# Patient Record
Sex: FEMALE | Race: WHITE | NOT HISPANIC OR LATINO | URBAN - METROPOLITAN AREA
[De-identification: names, ages, dates, MRNs, and addresses within clinical notes are randomized per-mention and may not be internally consistent; named-entity substitution may affect disease eponyms.]

---

## 2022-08-29 ENCOUNTER — APPOINTMENT (RX ONLY)
Dept: URBAN - METROPOLITAN AREA CLINIC 137 | Facility: CLINIC | Age: 77
Setting detail: DERMATOLOGY
End: 2022-08-29

## 2022-08-29 DIAGNOSIS — L20.89 OTHER ATOPIC DERMATITIS: ICD-10-CM | Status: INADEQUATELY CONTROLLED

## 2022-08-29 DIAGNOSIS — L71.0 PERIORAL DERMATITIS: ICD-10-CM

## 2022-08-29 PROCEDURE — ? REFERRAL

## 2022-08-29 PROCEDURE — ? COUNSELING

## 2022-08-29 PROCEDURE — ? PREDNISONE TAPER

## 2022-08-29 PROCEDURE — 99214 OFFICE O/P EST MOD 30 MIN: CPT

## 2022-08-29 PROCEDURE — ? PRESCRIPTION

## 2022-08-29 PROCEDURE — ? PRESCRIPTION MEDICATION MANAGEMENT

## 2022-08-29 PROCEDURE — ? MEDICATION COUNSELING

## 2022-08-29 PROCEDURE — ? OTC TREATMENT REGIMEN

## 2022-08-29 RX ORDER — PREDNISONE 10 MG/1
TABLET ORAL
Qty: 18 | Refills: 0 | Status: ERX

## 2022-08-29 ASSESSMENT — LOCATION SIMPLE DESCRIPTION DERM
LOCATION SIMPLE: RIGHT LIP
LOCATION SIMPLE: LEFT LIP
LOCATION SIMPLE: LEFT CHEEK
LOCATION SIMPLE: RIGHT CHEEK

## 2022-08-29 ASSESSMENT — LOCATION DETAILED DESCRIPTION DERM
LOCATION DETAILED: LEFT CENTRAL MALAR CHEEK
LOCATION DETAILED: RIGHT SUPERIOR VERMILION LIP
LOCATION DETAILED: RIGHT MEDIAL MALAR CHEEK
LOCATION DETAILED: LEFT INFERIOR VERMILION LIP

## 2022-08-29 ASSESSMENT — INVESTIGATOR STATIC GLOBAL ASSESSMENT
IN YOUR EXPERIENCE, AMONG ALL PATIENTS YOU HAVE SEEN WITH THIS CONDITION, HOW SEVERE IS THIS PATIENT'S CONDITION?: MODERATE TO SEVERE

## 2022-08-29 ASSESSMENT — SEVERITY ASSESSMENT 2020: SEVERITY 2020: SEVERE

## 2022-08-29 ASSESSMENT — LOCATION ZONE DERM
LOCATION ZONE: LIP
LOCATION ZONE: FACE

## 2022-08-29 ASSESSMENT — ITCH NUMERIC RATING SCALE: HOW SEVERE IS YOUR ITCHING?: 3

## 2022-08-29 ASSESSMENT — BSA ECZEMA: % BODY COVERED IN ECZEMA: 10

## 2022-08-29 NOTE — PROCEDURE: PRESCRIPTION MEDICATION MANAGEMENT
Render In Strict Bullet Format?: No
Detail Level: Zone
Continue Regimen: Tacrolimus around mouth
Initiate Treatment: Prednisone 10 3 for 3 days, 2 for 3 days ,1 for 3 days.

## 2022-08-29 NOTE — PROCEDURE: MEDICATION COUNSELING
Azithromycin Pregnancy And Lactation Text: This medication is considered safe during pregnancy and is also secreted in breast milk.
Imiquimod Counseling:  I discussed with the patient the risks of imiquimod including but not limited to erythema, scaling, itching, weeping, crusting, and pain. Patient understands that the inflammatory response to imiquimod is variable from person to person and was educated regarded proper titration schedule. If flu-like symptoms develop, patient knows to discontinue the medication and contact us.
Libtayo Pregnancy And Lactation Text: This medication is contraindicated in pregnancy and when breast feeding.
Bexarotene Pregnancy And Lactation Text: This medication is Pregnancy Category X and should not be given to women who are pregnant or may become pregnant. This medication should not be used if you are breast feeding.
Tetracycline Counseling: Patient counseled regarding possible photosensitivity and increased risk for sunburn. Patient instructed to avoid sunlight, if possible. When exposed to sunlight, patients should wear protective clothing, sunglasses, and sunscreen. The patient was instructed to call the office immediately if the following severe adverse effects occur:  hearing changes, easy bruising/bleeding, severe headache, or vision changes. The patient verbalized understanding of the proper use and possible adverse effects of tetracycline. All of the patient's questions and concerns were addressed. Patient understands to avoid pregnancy while on therapy due to potential birth defects.
Opzelura Pregnancy And Lactation Text: There is insufficient data to evaluate drug-associated risk for major birth defects, miscarriage, or other adverse maternal or fetal outcomes. There is a pregnancy registry that monitors pregnancy outcomes in pregnant persons exposed to the medication during pregnancy. It is unknown if this medication is excreted in breast milk. Do not breastfeed during treatment and for about 4 weeks after the last dose.
Tazorac Pregnancy And Lactation Text: This medication is not safe during pregnancy. It is unknown if this medication is excreted in breast milk.
Humira Counseling:  I discussed with the patient the risks of adalimumab including but not limited to myelosuppression, immunosuppression, autoimmune hepatitis, demyelinating diseases, lymphoma, and serious infections. The patient understands that monitoring is required including a PPD at baseline and must alert us or the primary physician if symptoms of infection or other concerning signs are noted.
Cibinqo Pregnancy And Lactation Text: It is unknown if this medication will adversely affect pregnancy or breast feeding. You should not take this medication if you are currently pregnant or planning a pregnancy or while breastfeeding.
Ketoconazole Pregnancy And Lactation Text: This medication is Pregnancy Category C and it isn't know if it is safe during pregnancy. It is also excreted in breast milk and breast feeding isn't recommended.
Stelara Pregnancy And Lactation Text: This medication is Pregnancy Category B and is considered safe during pregnancy. It is unknown if this medication is excreted in breast milk.
Topical Retinoid Pregnancy And Lactation Text: This medication is Pregnancy Category C. It is unknown if this medication is excreted in breast milk.
Finasteride Counseling:  I discussed with the patient the risks of use of finasteride including but not limited to decreased libido, decreased ejaculate volume, gynecomastia, and depression. Women should not handle medication. All of the patient's questions and concerns were addressed.
Erythromycin Pregnancy And Lactation Text: This medication is Pregnancy Category B and is considered safe during pregnancy. It is also excreted in breast milk.
Xolair Counseling:  Patient informed of potential adverse effects including but not limited to fever, muscle aches, rash and allergic reactions. The patient verbalized understanding of the proper use and possible adverse effects of Xolair. All of the patient's questions and concerns were addressed.
Clofazimine Pregnancy And Lactation Text: This medication is Pregnancy Category C and isn't considered safe during pregnancy. It is excreted in breast milk.
Benzoyl Peroxide Counseling: Patient counseled that medicine may cause skin irritation and bleach clothing. In the event of skin irritation, the patient was advised to reduce the amount of the drug applied or use it less frequently. The patient verbalized understanding of the proper use and possible adverse effects of benzoyl peroxide. All of the patient's questions and concerns were addressed.
Winlevi Pregnancy And Lactation Text: This medication is considered safe during pregnancy and breastfeeding.
Otezla Pregnancy And Lactation Text: This medication is Pregnancy Category C and it isn't known if it is safe during pregnancy. It is unknown if it is excreted in breast milk.
5-Fu Pregnancy And Lactation Text: This medication is Pregnancy Category X and contraindicated in pregnancy and in women who may become pregnant. It is unknown if this medication is excreted in breast milk.
Valtrex Pregnancy And Lactation Text: this medication is Pregnancy Category B and is considered safe during pregnancy. This medication is not directly found in breast milk but it's metabolite acyclovir is present.
Picato Counseling:  I discussed with the patient the risks of Picato including but not limited to erythema, scaling, itching, weeping, crusting, and pain.
Cyclosporine Pregnancy And Lactation Text: This medication is Pregnancy Category C and it isn't know if it is safe during pregnancy. This medication is excreted in breast milk.
Rituxan Counseling:  I discussed with the patient the risks of Rituxan infusions. Side effects can include infusion reactions, severe drug rashes including mucocutaneous reactions, reactivation of latent hepatitis and other infections and rarely progressive multifocal leukoencephalopathy. All of the patient's questions and concerns were addressed.
Spironolactone Counseling: Patient advised regarding risks of diarrhea, abdominal pain, hyperkalemia, birth defects (for female patients), liver toxicity and renal toxicity. The patient may need blood work to monitor liver and kidney function and potassium levels while on therapy. The patient verbalized understanding of the proper use and possible adverse effects of spironolactone. All of the patient's questions and concerns were addressed.
Niacinamide Counseling: I recommended taking niacin or niacinamide, also know as vitamin B3, twice daily. Recent evidence suggests that taking vitamin B3 (500 mg twice daily) can reduce the risk of actinic keratoses and non-melanoma skin cancers. Side effects of vitamin B3 include flushing and headache.
Albendazole Counseling:  I discussed with the patient the risks of albendazole including but not limited to cytopenia, kidney damage, nausea/vomiting and severe allergy. The patient understands that this medication is being used in an off-label manner.
Isotretinoin Counseling: Patient should get monthly blood tests, not donate blood, not drive at night if vision affected, not share medication, and not undergo elective surgery for 6 months after tx completed. Side effects reviewed, pt to contact office should one occur.
Topical Clindamycin Counseling: Patient counseled that this medication may cause skin irritation or allergic reactions. In the event of skin irritation, the patient was advised to reduce the amount of the drug applied or use it less frequently. The patient verbalized understanding of the proper use and possible adverse effects of clindamycin. All of the patient's questions and concerns were addressed.
Bactrim Counseling:  I discussed with the patient the risks of sulfa antibiotics including but not limited to GI upset, allergic reaction, drug rash, diarrhea, dizziness, photosensitivity, and yeast infections. Rarely, more serious reactions can occur including but not limited to aplastic anemia, agranulocytosis, methemoglobinemia, blood dyscrasias, liver or kidney failure, lung infiltrates or desquamative/blistering drug rashes.
Finasteride Pregnancy And Lactation Text: This medication is absolutely contraindicated during pregnancy. It is unknown if it is excreted in breast milk.
Tazorac Counseling:  Patient advised that medication is irritating and drying. Patient may need to apply sparingly and wash off after an hour before eventually leaving it on overnight. The patient verbalized understanding of the proper use and possible adverse effects of tazorac. All of the patient's questions and concerns were addressed.
Tetracycline Pregnancy And Lactation Text: This medication is Pregnancy Category D and not consider safe during pregnancy. It is also excreted in breast milk.
Drysol Counseling:  I discussed with the patient the risks of drysol/aluminum chloride including but not limited to skin rash, itching, irritation, burning.
Cimzia Counseling:  I discussed with the patient the risks of Cimzia including but not limited to immunosuppression, allergic reactions and infections. The patient understands that monitoring is required including a PPD at baseline and must alert us or the primary physician if symptoms of infection or other concerning signs are noted.
Taltz Counseling: I discussed with the patient the risks of ixekizumab including but not limited to immunosuppression, serious infections, worsening of inflammatory bowel disease and drug reactions. The patient understands that monitoring is required including a PPD at baseline and must alert us or the primary physician if symptoms of infection or other concerning signs are noted.
Terbinafine Counseling: Patient counseling regarding adverse effects of terbinafine including but not limited to headache, diarrhea, rash, upset stomach, liver function test abnormalities, itching, taste/smell disturbance, nausea, abdominal pain, and flatulence. There is a rare possibility of liver failure that can occur when taking terbinafine. The patient understands that a baseline LFT and kidney function test may be required. The patient verbalized understanding of the proper use and possible adverse effects of terbinafine. All of the patient's questions and concerns were addressed.
Use Enhanced Medication Counseling?: No
Colchicine Counseling:  Patient counseled regarding adverse effects including but not limited to stomach upset (nausea, vomiting, stomach pain, or diarrhea). Patient instructed to limit alcohol consumption while taking this medication. Colchicine may reduce blood counts especially with prolonged use. The patient understands that monitoring of kidney function and blood counts may be required, especially at baseline. The patient verbalized understanding of the proper use and possible adverse effects of colchicine. All of the patient's questions and concerns were addressed.
Xolair Pregnancy And Lactation Text: This medication is Pregnancy Category B and is considered safe during pregnancy. This medication is excreted in breast milk.
Spironolactone Pregnancy And Lactation Text: This medication can cause feminization of the male fetus and should be avoided during pregnancy. The active metabolite is also found in breast milk.
Metronidazole Counseling:  I discussed with the patient the risks of metronidazole including but not limited to seizures, nausea/vomiting, a metallic taste in the mouth, nausea/vomiting and severe allergy.
Benzoyl Peroxide Pregnancy And Lactation Text: This medication is Pregnancy Category C. It is unknown if benzoyl peroxide is excreted in breast milk.
Oxybutynin Counseling:  I discussed with the patient the risks of oxybutynin including but not limited to skin rash, drowsiness, dry mouth, difficulty urinating, and blurred vision.
Protopic Pregnancy And Lactation Text: This medication is Pregnancy Category C. It is unknown if this medication is excreted in breast milk when applied topically.
Zyclara Counseling:  I discussed with the patient the risks of imiquimod including but not limited to erythema, scaling, itching, weeping, crusting, and pain. Patient understands that the inflammatory response to imiquimod is variable from person to person and was educated regarded proper titration schedule. If flu-like symptoms develop, patient knows to discontinue the medication and contact us.
Rituxan Pregnancy And Lactation Text: This medication is Pregnancy Category C and it isn't know if it is safe during pregnancy. It is unknown if this medication is excreted in breast milk but similar antibodies are known to be excreted.
Methotrexate Counseling:  Patient counseled regarding adverse effects of methotrexate including but not limited to nausea, vomiting, abnormalities in liver function tests. Patients may develop mouth sores, rash, diarrhea, and abnormalities in blood counts. The patient understands that monitoring is required including LFT's and blood counts. There is a rare possibility of scarring of the liver and lung problems that can occur when taking methotrexate. Persistent nausea, loss of appetite, pale stools, dark urine, cough, and shortness of breath should be reported immediately. Patient advised to discontinue methotrexate treatment at least three months before attempting to become pregnant. I discussed the need for folate supplements while taking methotrexate. These supplements can decrease side effects during methotrexate treatment. The patient verbalized understanding of the proper use and possible adverse effects of methotrexate. All of the patient's questions and concerns were addressed.
Ilumya Counseling: I discussed with the patient the risks of tildrakizumab including but not limited to immunosuppression, malignancy, posterior leukoencephalopathy syndrome, and serious infections. The patient understands that monitoring is required including a PPD at baseline and must alert us or the primary physician if symptoms of infection or other concerning signs are noted.
Topical Clindamycin Pregnancy And Lactation Text: This medication is Pregnancy Category B and is considered safe during pregnancy. It is unknown if it is excreted in breast milk.
Niacinamide Pregnancy And Lactation Text: These medications are considered safe during pregnancy.
Bactrim Pregnancy And Lactation Text: This medication is Pregnancy Category D and is known to cause fetal risk. It is also excreted in breast milk.
Isotretinoin Pregnancy And Lactation Text: This medication is Pregnancy Category X and is considered extremely dangerous during pregnancy. It is unknown if it is excreted in breast milk.
Albendazole Pregnancy And Lactation Text: This medication is Pregnancy Category C and it isn't known if it is safe during pregnancy. It is also excreted in breast milk.
Drysol Pregnancy And Lactation Text: This medication is considered safe during pregnancy and breast feeding.
Terbinafine Pregnancy And Lactation Text: This medication is Pregnancy Category B and is considered safe during pregnancy. It is also excreted in breast milk and breast feeding isn't recommended.
Gabapentin Counseling: I discussed with the patient the risks of gabapentin including but not limited to dizziness, somnolence, fatigue and ataxia.
Klisyri Counseling:  I discussed with the patient the risks of Buzzy Her including but not limited to erythema, scaling, itching, weeping, crusting, and pain.
Cimzia Pregnancy And Lactation Text: This medication crosses the placenta but can be considered safe in certain situations. Cimzia may be excreted in breast milk.
Fluconazole Counseling:  Patient counseled regarding adverse effects of fluconazole including but not limited to headache, diarrhea, nausea, upset stomach, liver function test abnormalities, taste disturbance, and stomach pain. There is a rare possibility of liver failure that can occur when taking fluconazole. The patient understands that monitoring of LFTs and kidney function test may be required, especially at baseline. The patient verbalized understanding of the proper use and possible adverse effects of fluconazole. All of the patient's questions and concerns were addressed.
Methotrexate Pregnancy And Lactation Text: This medication is Pregnancy Category X and is known to cause fetal harm. This medication is excreted in breast milk.
Qbrexza Counseling:  I discussed with the patient the risks of Erin Davison including but not limited to headache, mydriasis, blurred vision, dry eyes, nasal dryness, dry mouth, dry throat, dry skin, urinary hesitation, and constipation. Local skin reactions including erythema, burning, stinging, and itching can also occur.
Carac Counseling:  I discussed with the patient the risks of Carac including but not limited to erythema, scaling, itching, weeping, crusting, and pain.
Cephalexin Counseling: I counseled the patient regarding use of cephalexin as an antibiotic for prophylactic and/or therapeutic purposes. Cephalexin (commonly prescribed under brand name Keflex) is a cephalosporin antibiotic which is active against numerous classes of bacteria, including most skin bacteria. Side effects may include nausea, diarrhea, gastrointestinal upset, rash, hives, yeast infections, and in rare cases, hepatitis, kidney disease, seizures, fever, confusion, neurologic symptoms, and others. Patients with severe allergies to penicillin medications are cautioned that there is about a 10% incidence of cross-reactivity with cephalosporins. When possible, patients with penicillin allergies should use alternatives to cephalosporins for antibiotic therapy.
Siliq Counseling:  I discussed with the patient the risks of Siliq including but not limited to new or worsening depression, suicidal thoughts and behavior, immunosuppression, malignancy, posterior leukoencephalopathy syndrome, and serious infections. The patient understands that monitoring is required including a PPD at baseline and must alert us or the primary physician if symptoms of infection or other concerning signs are noted. There is also a special program designed to monitor depression which is required with Siliq.
Protopic Counseling: Patient may experience a mild burning sensation during topical application. Protopic is not approved in children less than 3years of age. There have been case reports of hematologic and skin malignancies in patients using topical calcineurin inhibitors although causality is questionable.
Metronidazole Pregnancy And Lactation Text: This medication is Pregnancy Category B and considered safe during pregnancy. It is also excreted in breast milk.
High Dose Vitamin A Counseling: Side effects reviewed, pt to contact office should one occur.
Nsaids Counseling: NSAID Counseling: I discussed with the patient that NSAIDs should be taken with food. Prolonged use of NSAIDs can result in the development of stomach ulcers. Patient advised to stop taking NSAIDs if abdominal pain occurs. The patient verbalized understanding of the proper use and possible adverse effects of NSAIDs. All of the patient's questions and concerns were addressed.
Topical Ketoconazole Counseling: Patient counseled that this medication may cause skin irritation or allergic reactions. In the event of skin irritation, the patient was advised to reduce the amount of the drug applied or use it less frequently. The patient verbalized understanding of the proper use and possible adverse effects of ketoconazole. All of the patient's questions and concerns were addressed.
SSKI Counseling:  I discussed with the patient the risks of SSKI including but not limited to thyroid abnormalities, metallic taste, GI upset, fever, headache, acne, arthralgias, paraesthesias, lymphadenopathy, easy bleeding, arrhythmias, and allergic reaction.
Ilumya Pregnancy And Lactation Text: The risk during pregnancy and breastfeeding is uncertain with this medication.
Klisyri Pregnancy And Lactation Text: It is unknown if this medication can harm a developing fetus or if it is excreted in breast milk.
Azathioprine Counseling:  I discussed with the patient the risks of azathioprine including but not limited to myelosuppression, immunosuppression, hepatotoxicity, lymphoma, and infections. The patient understands that monitoring is required including baseline LFTs, Creatinine, possible TPMP genotyping and weekly CBCs for the first month and then every 2 weeks thereafter. The patient verbalized understanding of the proper use and possible adverse effects of azathioprine. All of the patient's questions and concerns were addressed.
Ivermectin Counseling:  Patient instructed to take medication on an empty stomach with a full glass of water. Patient informed of potential adverse effects including but not limited to nausea, diarrhea, dizziness, itching, and swelling of the extremities or lymph nodes. The patient verbalized understanding of the proper use and possible adverse effects of ivermectin. All of the patient's questions and concerns were addressed.
Quinolones Counseling:  I discussed with the patient the risks of fluoroquinolones including but not limited to GI upset, allergic reaction, drug rash, diarrhea, dizziness, photosensitivity, yeast infections, liver function test abnormalities, tendonitis/tendon rupture.
Cosentyx Counseling:  I discussed with the patient the risks of Cosentyx including but not limited to worsening of Crohn's disease, immunosuppression, allergic reactions and infections. The patient understands that monitoring is required including a PPD at baseline and must alert us or the primary physician if symptoms of infection or other concerning signs are noted.
Elidel Counseling: Patient may experience a mild burning sensation during topical application. Elidel is not approved in children less than 3years of age. There have been case reports of hematologic and skin malignancies in patients using topical calcineurin inhibitors although causality is questionable.
Dapsone Counseling: I discussed with the patient the risks of dapsone including but not limited to hemolytic anemia, agranulocytosis, rashes, methemoglobinemia, kidney failure, peripheral neuropathy, headaches, GI upset, and liver toxicity. Patients who start dapsone require monitoring including baseline LFTs and weekly CBCs for the first month, then every month thereafter. The patient verbalized understanding of the proper use and possible adverse effects of dapsone. All of the patient's questions and concerns were addressed.
Qbrexza Pregnancy And Lactation Text: There is no available data on Qbrexza use in pregnant women. There is no available data on Qbrexza use in lactation.
Fluconazole Pregnancy And Lactation Text: This medication is Pregnancy Category C and it isn't know if it is safe during pregnancy. It is also excreted in breast milk.
Prednisone Counseling:  I discussed with the patient the risks of prolonged use of prednisone including but not limited to weight gain, insomnia, osteoporosis, mood changes, diabetes, susceptibility to infection, glaucoma and high blood pressure. In cases where prednisone use is prolonged, patients should be monitored with blood pressure checks, serum glucose levels and an eye exam.  Additionally, the patient may need to be placed on GI prophylaxis, PCP prophylaxis, and calcium and vitamin D supplementation and/or a bisphosphonate. The patient verbalized understanding of the proper use and the possible adverse effects of prednisone. All of the patient's questions and concerns were addressed.
Sski Pregnancy And Lactation Text: This medication is Pregnancy Category D and isn't considered safe during pregnancy. It is excreted in breast milk.
Propranolol Counseling:  I discussed with the patient the risks of propranolol including but not limited to low heart rate, low blood pressure, low blood sugar, restlessness and increased cold sensitivity. They should call the office if they experience any of these side effects.
Minocycline Counseling: Patient advised regarding possible photosensitivity and discoloration of the teeth, skin, lips, tongue and gums. Patient instructed to avoid sunlight, if possible. When exposed to sunlight, patients should wear protective clothing, sunglasses, and sunscreen. The patient was instructed to call the office immediately if the following severe adverse effects occur:  hearing changes, easy bruising/bleeding, severe headache, or vision changes. The patient verbalized understanding of the proper use and possible adverse effects of minocycline. All of the patient's questions and concerns were addressed.
Cimetidine Counseling:  I discussed with the patient the risks of Cimetidine including but not limited to gynecomastia, headache, diarrhea, nausea, drowsiness, arrhythmias, pancreatitis, skin rashes, psychosis, bone marrow suppression and kidney toxicity.
High Dose Vitamin A Pregnancy And Lactation Text: High dose vitamin A therapy is contraindicated during pregnancy and breast feeding.
Nsaids Pregnancy And Lactation Text: These medications are considered safe up to 30 weeks gestation. It is excreted in breast milk.
Minoxidil Counseling: Minoxidil is a topical medication which can increase blood flow where it is applied. It is uncertain how this medication increases hair growth. Side effects are uncommon and include stinging and allergic reactions.
Cephalexin Pregnancy And Lactation Text: This medication is Pregnancy Category B and considered safe during pregnancy. It is also excreted in breast milk but can be used safely for shorter doses.
Glycopyrrolate Counseling:  I discussed with the patient the risks of glycopyrrolate including but not limited to skin rash, drowsiness, dry mouth, difficulty urinating, and blurred vision.
Infliximab Counseling:  I discussed with the patient the risks of infliximab including but not limited to myelosuppression, immunosuppression, autoimmune hepatitis, demyelinating diseases, lymphoma, and serious infections. The patient understands that monitoring is required including a PPD at baseline and must alert us or the primary physician if symptoms of infection or other concerning signs are noted.
Azathioprine Pregnancy And Lactation Text: This medication is Pregnancy Category D and isn't considered safe during pregnancy. It is unknown if this medication is excreted in breast milk.
Opioid Counseling: I discussed with the patient the potential side effects of opioids including but not limited to addiction, altered mental status, and depression. I stressed avoiding alcohol, benzodiazepines, muscle relaxants and sleep aids unless specifically okayed by a physician. The patient verbalized understanding of the proper use and possible adverse effects of opioids. All of the patient's questions and concerns were addressed. They were instructed to flush the remaining pills down the toilet if they did not need them for pain.
Detail Level: Simple
Rhofade Counseling: Rhofade is a topical medication which can decrease superficial blood flow where applied. Side effects are uncommon and include stinging, redness and allergic reactions.
Dapsone Pregnancy And Lactation Text: This medication is Pregnancy Category C and is not considered safe during pregnancy or breast feeding.
Griseofulvin Counseling:  I discussed with the patient the risks of griseofulvin including but not limited to photosensitivity, cytopenia, liver damage, nausea/vomiting and severe allergy. The patient understands that this medication is best absorbed when taken with a fatty meal (e.g., ice cream or french fries).
Propranolol Pregnancy And Lactation Text: This medication is Pregnancy Category C and it isn't known if it is safe during pregnancy. It is excreted in breast milk.
Calcipotriene Counseling: Cantharidin Counseling:  I discussed with the patient the risks of Cantharidin including but not limited to pain, redness, burning, itching, and blistering.
Simponi Counseling:  I discussed with the patient the risks of golimumab including but not limited to myelosuppression, immunosuppression, autoimmune hepatitis, demyelinating diseases, lymphoma, and serious infections. The patient understands that monitoring is required including a PPD at baseline and must alert us or the primary physician if symptoms of infection or other concerning signs are noted.
Thalidomide Counseling: I discussed with the patient the risks of thalidomide including but not limited to birth defects, anxiety, weakness, chest pain, dizziness, cough and severe allergy.
Cellcept Counseling:  I discussed with the patient the risks of mycophenolate mofetil including but not limited to infection/immunosuppression, GI upset, hypokalemia, hypercholesterolemia, bone marrow suppression, lymphoproliferative disorders, malignancy, GI ulceration/bleed/perforation, colitis, interstitial lung disease, kidney failure, progressive multifocal leukoencephalopathy, and birth defects. The patient understands that monitoring is required including a baseline creatinine and regular CBC testing. In addition, patient must alert us immediately if symptoms of infection or other concerning signs are noted.
Odomzo Counseling- I discussed with the patient the risks of Odomzo including but not limited to nausea, vomiting, diarrhea, constipation, weight loss, changes in the sense of taste, decreased appetite, muscle spasms, and hair loss. The patient verbalized understanding of the proper use and possible adverse effects of Odomzo. All of the patient's questions and concerns were addressed.
Topical Sulfur Applications Counseling: Topical Sulfur Counseling: Patient counseled that this medication may cause skin irritation or allergic reactions. In the event of skin irritation, the patient was advised to reduce the amount of the drug applied or use it less frequently. The patient verbalized understanding of the proper use and possible adverse effects of topical sulfur application. All of the patient's questions and concerns were addressed.
Clindamycin Counseling: I counseled the patient regarding use of clindamycin as an antibiotic for prophylactic and/or therapeutic purposes. Clindamycin is active against numerous classes of bacteria, including skin bacteria. Side effects may include nausea, diarrhea, gastrointestinal upset, rash, hives, yeast infections, and in rare cases, colitis.
Glycopyrrolate Pregnancy And Lactation Text: This medication is Pregnancy Category B and is considered safe during pregnancy. It is unknown if it is excreted breast milk.
Minoxidil Pregnancy And Lactation Text: This medication has not been assigned a Pregnancy Risk Category but animal studies failed to show danger with the topical medication. It is unknown if the medication is excreted in breast milk.
Opioid Pregnancy And Lactation Text: These medications can lead to premature delivery and should be avoided during pregnancy. These medications are also present in breast milk in small amounts.
Dupixent Counseling: I discussed with the patient the risks of dupilumab including but not limited to eye infection and irritation, cold sores, injection site reactions, worsening of asthma, allergic reactions and increased risk of parasitic infection. Live vaccines should be avoided while taking dupilumab. Dupilumab will also interact with certain medications such as warfarin and cyclosporine. The patient understands that monitoring is required and they must alert us or the primary physician if symptoms of infection or other concerning signs are noted.
Eucrisa Counseling: Patient may experience a mild burning sensation during topical application. Angelica Fears is not approved in children less than 1months of age.
Rifampin Counseling: I discussed with the patient the risks of rifampin including but not limited to liver damage, kidney damage, red-orange body fluids, nausea/vomiting and severe allergy.
Griseofulvin Pregnancy And Lactation Text: This medication is Pregnancy Category X and is known to cause serious birth defects. It is unknown if this medication is excreted in breast milk but breast feeding should be avoided.
Rhofade Pregnancy And Lactation Text: This medication has not been assigned a Pregnancy Risk Category. It is unknown if the medication is excreted in breast milk.
Dutasteride Counseling: Dustasteride Counseling:  I discussed with the patient the risks of use of dutasteride including but not limited to decreased libido, decreased ejaculate volume, and gynecomastia. Women who can become pregnant should not handle medication. All of the patient's questions and concerns were addressed.
Thalidomide Pregnancy And Lactation Text: This medication is Pregnancy Category X and is absolutely contraindicated during pregnancy. It is unknown if it is excreted in breast milk.
Birth Control Pills Counseling: Birth Control Pill Counseling: I discussed with the patient the potential side effects of OCPs including but not limited to increased risk of stroke, heart attack, thrombophlebitis, deep venous thrombosis, hepatic adenomas, breast changes, GI upset, headaches, and depression. The patient verbalized understanding of the proper use and possible adverse effects of OCPs. All of the patient's questions and concerns were addressed.
Tremfya Counseling: I discussed with the patient the risks of guselkumab including but not limited to immunosuppression, serious infections, and drug reactions. The patient understands that monitoring is required including a PPD at baseline and must alert us or the primary physician if symptoms of infection or other concerning signs are noted.
Calcipotriene Pregnancy And Lactation Text: This medication has not been proven safe during pregnancy. It is unknown if this medication is excreted in breast milk.
Doxepin Counseling:  Patient advised that the medication is sedating and not to drive a car after taking this medication. Patient informed of potential adverse effects including but not limited to dry mouth, urinary retention, and blurry vision. The patient verbalized understanding of the proper use and possible adverse effects of doxepin. All of the patient's questions and concerns were addressed.
Olumiant Counseling: I discussed with the patient the risks of Olumiant therapy including but not limited to upper respiratory tract infections, shingles, cold sores, and nausea. Live vaccines should be avoided. This medication has been linked to serious infections; higher rate of mortality; malignancy and lymphoproliferative disorders; major adverse cardiovascular events; thrombosis; gastrointestinal perforations; neutropenia; lymphopenia; anemia; liver enzyme elevations; and lipid elevations.
Topical Sulfur Applications Pregnancy And Lactation Text: This medication is considered safe during pregnancy and breast feeding secondary to limited systemic absorption.
Dupixent Pregnancy And Lactation Text: This medication likely crosses the placenta but the risk for the fetus is uncertain. This medication is excreted in breast milk.
Aklief counseling:  Patient advised to apply a pea-sized amount only at bedtime and wait 30 minutes after washing their face before applying. If too drying, patient may add a non-comedogenic moisturizer. The most commonly reported side effects including irritation, redness, scaling, dryness, stinging, burning, itching, and increased risk of sunburn. The patient verbalized understanding of the proper use and possible adverse effects of retinoids. All of the patient's questions and concerns were addressed.
Clindamycin Pregnancy And Lactation Text: This medication can be used in pregnancy if certain situations. Clindamycin is also present in breast milk.
Hydroxychloroquine Counseling:  I discussed with the patient that a baseline ophthalmologic exam is needed at the start of therapy and every year thereafter while on therapy. A CBC may also be warranted for monitoring. The side effects of this medication were discussed with the patient, including but not limited to agranulocytosis, aplastic anemia, seizures, rashes, retinopathy, and liver toxicity. Patient instructed to call the office should any adverse effect occur. The patient verbalized understanding of the proper use and possible adverse effects of Plaquenil. All the patient's questions and concerns were addressed.
Acitretin Counseling:  I discussed with the patient the risks of acitretin including but not limited to hair loss, dry lips/skin/eyes, liver damage, hyperlipidemia, depression/suicidal ideation, photosensitivity. Serious rare side effects can include but are not limited to pancreatitis, pseudotumor cerebri, bony changes, clot formation/stroke/heart attack. Patient understands that alcohol is contraindicated since it can result in liver toxicity and significantly prolong the elimination of the drug by many years.
Rifampin Pregnancy And Lactation Text: This medication is Pregnancy Category C and it isn't know if it is safe during pregnancy. It is also excreted in breast milk and should not be used if you are breast feeding.
Dutasteride Pregnancy And Lactation Text: This medication is absolutely contraindicated in women, especially during pregnancy and breast feeding. Feminization of male fetuses is possible if taking while pregnant.
Itraconazole Counseling:  I discussed with the patient the risks of itraconazole including but not limited to liver damage, nausea/vomiting, neuropathy, and severe allergy. The patient understands that this medication is best absorbed when taken with acidic beverages such as non-diet cola or ginger ale. The patient understands that monitoring is required including baseline LFTs and repeat LFTs at intervals. The patient understands that they are to contact us or the primary physician if concerning signs are noted.
Mirvaso Counseling: Estil Nurse is a topical medication which can decrease superficial blood flow where applied. Side effects are uncommon and include stinging, redness and allergic reactions.
Cantharidin Counseling: Calcipotriene Counseling:  I discussed with the patient the risks of calcipotriene including but not limited to erythema, scaling, itching, and irritation.
Adbry Counseling: I discussed with the patient the risks of tralokinumab including but not limited to eye infection and irritation, cold sores, injection site reactions, worsening of asthma, allergic reactions and increased risk of parasitic infection. Live vaccines should be avoided while taking tralokinumab. The patient understands that monitoring is required and they must alert us or the primary physician if symptoms of infection or other concerning signs are noted.
Tranexamic Acid Counseling:  Patient advised of the small risk of bleeding problems with tranexamic acid. They were also instructed to call if they developed any nausea, vomiting or diarrhea. All of the patient's questions and concerns were addressed.
Solaraze Counseling:  I discussed with the patient the risks of Solaraze including but not limited to erythema, scaling, itching, weeping, crusting, and pain.
Arava Counseling:  Patient counseled regarding adverse effects of Arava including but not limited to nausea, vomiting, abnormalities in liver function tests. Patients may develop mouth sores, rash, diarrhea, and abnormalities in blood counts. The patient understands that monitoring is required including LFTs and blood counts. There is a rare possibility of scarring of the liver and lung problems that can occur when taking methotrexate. Persistent nausea, loss of appetite, pale stools, dark urine, cough, and shortness of breath should be reported immediately. Patient advised to discontinue Arava treatment and consult with a physician prior to attempting conception. The patient will have to undergo a treatment to eliminate Arava from the body prior to conception.
Birth Control Pills Pregnancy And Lactation Text: This medication should be avoided if pregnant and for the first 30 days post-partum.
Doxycycline Counseling:  Patient counseled regarding possible photosensitivity and increased risk for sunburn. Patient instructed to avoid sunlight, if possible. When exposed to sunlight, patients should wear protective clothing, sunglasses, and sunscreen. The patient was instructed to call the office immediately if the following severe adverse effects occur:  hearing changes, easy bruising/bleeding, severe headache, or vision changes. The patient verbalized understanding of the proper use and possible adverse effects of doxycycline. All of the patient's questions and concerns were addressed.
Doxepin Pregnancy And Lactation Text: This medication is Pregnancy Category C and it isn't known if it is safe during pregnancy. It is also excreted in breast milk and breast feeding isn't recommended.
Skyrizi Counseling: I discussed with the patient the risks of risankizumab-rzaa including but not limited to immunosuppression, and serious infections. The patient understands that monitoring is required including a PPD at baseline and must alert us or the primary physician if symptoms of infection or other concerning signs are noted.
Aklief Pregnancy And Lactation Text: It is unknown if this medication is safe to use during pregnancy. It is unknown if this medication is excreted in breast milk. Breastfeeding women should use the topical cream on the smallest area of the skin for the shortest time needed while breastfeeding. Do not apply to nipple and areola.
Oral Minoxidil Counseling- I discussed with the patient the risks of oral minoxidil including but not limited to shortness of breath, swelling of the feet or ankles, dizziness, lightheadedness, unwanted hair growth and allergic reaction. The patient verbalized understanding of the proper use and possible adverse effects of oral minoxidil. All of the patient's questions and concerns were addressed.
Wartpeel Counseling:  I discussed with the patient the risks of Wartpeel including but not limited to erythema, scaling, itching, weeping, crusting, and pain.
Cyclophosphamide Counseling:  I discussed with the patient the risks of cyclophosphamide including but not limited to hair loss, hormonal abnormalities, decreased fertility, abdominal pain, diarrhea, nausea and vomiting, bone marrow suppression and infection. The patient understands that monitoring is required while taking this medication.
Olumiant Pregnancy And Lactation Text: Based on animal studies, Lavcasi Davenportes may cause embryo-fetal harm when administered to pregnant women. The medication should not be used in pregnancy. Breastfeeding is not recommended during treatment.
Enbrel Counseling:  I discussed with the patient the risks of etanercept including but not limited to myelosuppression, immunosuppression, autoimmune hepatitis, demyelinating diseases, lymphoma, and infections. The patient understands that monitoring is required including a PPD at baseline and must alert us or the primary physician if symptoms of infection or other concerning signs are noted.
Hydroxychloroquine Pregnancy And Lactation Text: This medication has been shown to cause fetal harm but it isn't assigned a Pregnancy Risk Category. There are small amounts excreted in breast milk.
Acitretin Pregnancy And Lactation Text: This medication is Pregnancy Category X and should not be given to women who are pregnant or may become pregnant in the future. This medication is excreted in breast milk.
Sarecycline Counseling: Patient advised regarding possible photosensitivity and discoloration of the teeth, skin, lips, tongue and gums. Patient instructed to avoid sunlight, if possible. When exposed to sunlight, patients should wear protective clothing, sunglasses, and sunscreen. The patient was instructed to call the office immediately if the following severe adverse effects occur:  hearing changes, easy bruising/bleeding, severe headache, or vision changes. The patient verbalized understanding of the proper use and possible adverse effects of sarecycline. All of the patient's questions and concerns were addressed.
Erivedge Counseling- I discussed with the patient the risks of Erivedge including but not limited to nausea, vomiting, diarrhea, constipation, weight loss, changes in the sense of taste, decreased appetite, muscle spasms, and hair loss. The patient verbalized understanding of the proper use and possible adverse effects of Erivedge. All of the patient's questions and concerns were addressed.
Hydroquinone Counseling:  Patient advised that medication may result in skin irritation, lightening (hypopigmentation), dryness, and burning. In the event of skin irritation, the patient was advised to reduce the amount of the drug applied or use it less frequently. Rarely, spots that are treated with hydroquinone can become darker (pseudoochronosis). Should this occur, patient instructed to stop medication and call the office. The patient verbalized understanding of the proper use and possible adverse effects of hydroquinone. All of the patient's questions and concerns were addressed.
Solaraze Pregnancy And Lactation Text: This medication is Pregnancy Category B and is considered safe. There is some data to suggest avoiding during the third trimester. It is unknown if this medication is excreted in breast milk.
Adbry Pregnancy And Lactation Text: It is unknown if this medication will adversely affect pregnancy or breast feeding.
Hydroxyzine Counseling: Patient advised that the medication is sedating and not to drive a car after taking this medication. Patient informed of potential adverse effects including but not limited to dry mouth, urinary retention, and blurry vision. The patient verbalized understanding of the proper use and possible adverse effects of hydroxyzine. All of the patient's questions and concerns were addressed.
Cantharidin Pregnancy And Lactation Text: The use of this medication during pregnancy or lactation is not recommended as there is insufficient data.
Tranexamic Acid Pregnancy And Lactation Text: It is unknown if this medication is safe during pregnancy or breast feeding.
Rinvoq Counseling: I discussed with the patient the risks of Rinvoq therapy including but not limited to upper respiratory tract infections, shingles, cold sores, bronchitis, nausea, cough, fever, acne, and headache. Live vaccines should be avoided. This medication has been linked to serious infections; higher rate of mortality; malignancy and lymphoproliferative disorders; major adverse cardiovascular events; thrombosis; thrombocytopenia, anemia, and neutropenia; lipid elevations; liver enzyme elevations; and gastrointestinal perforations.
Xeljanz Counseling: Tita Davila Counseling: I discussed with the patient the risks of Tita Giovanni therapy including increased risk of infection, liver issues, headache, diarrhea, or cold symptoms. Live vaccines should be avoided. They were instructed to call if they have any problems.
Oral Minoxidil Pregnancy And Lactation Text: This medication should only be used when clearly needed if you are pregnant, attempting to become pregnant or breast feeding.
Azelaic Acid Counseling: Patient counseled that medicine may cause skin irritation and to avoid applying near the eyes. In the event of skin irritation, the patient was advised to reduce the amount of the drug applied or use it less frequently. The patient verbalized understanding of the proper use and possible adverse effects of azelaic acid. All of the patient's questions and concerns were addressed.
Opzelura Counseling:  I discussed with the patient the risks of Edward Lancaster including but not limited to nasopharngitis, bronchitis, ear infection, eosinophila, hives, diarrhea, folliculitis, tonsillitis, and rhinorrhea. Taken orally, this medication has been linked to serious infections; higher rate of mortality; malignancy and lymphoproliferative disorders; major adverse cardiovascular events; thrombosis; thrombocytopenia, anemia, and neutropenia; and lipid elevations.
Doxycycline Pregnancy And Lactation Text: This medication is Pregnancy Category D and not consider safe during pregnancy. It is also excreted in breast milk but is considered safe for shorter treatment courses.
Bexarotene Counseling:  I discussed with the patient the risks of bexarotene including but not limited to hair loss, dry lips/skin/eyes, liver abnormalities, hyperlipidemia, pancreatitis, depression/suicidal ideation, photosensitivity, drug rash/allergic reactions, hypothyroidism, anemia, leukopenia, infection, cataracts, and teratogenicity. Patient understands that they will need regular blood tests to check lipid profile, liver function tests, white blood cell count, thyroid function tests and pregnancy test if applicable.
Libtayo Counseling- I discussed with the patient the risks of Libtayo including but not limited to nausea, vomiting, diarrhea, and bone or muscle pain. The patient verbalized understanding of the proper use and possible adverse effects of Libtayo. All of the patient's questions and concerns were addressed.
Cyclophosphamide Pregnancy And Lactation Text: This medication is Pregnancy Category D and it isn't considered safe during pregnancy. This medication is excreted in breast milk.
Azithromycin Counseling:  I discussed with the patient the risks of azithromycin including but not limited to GI upset, allergic reaction, drug rash, diarrhea, and yeast infections.
Topical Retinoid counseling:  Patient advised to apply a pea-sized amount only at bedtime and wait 30 minutes after washing their face before applying. If too drying, patient may add a non-comedogenic moisturizer. The patient verbalized understanding of the proper use and possible adverse effects of retinoids. All of the patient's questions and concerns were addressed.
Cibinqo Counseling: I discussed with the patient the risks of Cibinqo therapy including but not limited to common cold, nausea, headache, cold sores, increased blood CPK levels, dizziness, UTIs, fatigue, acne, and vomitting. Live vaccines should be avoided. This medication has been linked to serious infections; higher rate of mortality; malignancy and lymphoproliferative disorders; major adverse cardiovascular events; thrombosis; thrombocytopenia and lymphopenia; lipid elevations; and retinal detachment.
Ketoconazole Counseling:   Patient counseled regarding improving absorption with orange juice. Adverse effects include but are not limited to breast enlargement, headache, diarrhea, nausea, upset stomach, liver function test abnormalities, taste disturbance, and stomach pain. There is a rare possibility of liver failure that can occur when taking ketoconazole. The patient understands that monitoring of LFTs may be required, especially at baseline. The patient verbalized understanding of the proper use and possible adverse effects of ketoconazole. All of the patient's questions and concerns were addressed.
5-Fu Counseling: 5-Fluorouracil Counseling:  I discussed with the patient the risks of 5-fluorouracil including but not limited to erythema, scaling, itching, weeping, crusting, and pain.
Xeltamikoz Pregnancy And Lactation Text: This medication is Pregnancy Category D and is not considered safe during pregnancy. The risk during breast feeding is also uncertain.
Clofazimine Counseling:  I discussed with the patient the risks of clofazimine including but not limited to skin and eye pigmentation, liver damage, nausea/vomiting, gastrointestinal bleeding and allergy.
Hydroxyzine Pregnancy And Lactation Text: This medication is not safe during pregnancy and should not be taken. It is also excreted in breast milk and breast feeding isn't recommended.
Stelara Counseling:  I discussed with the patient the risks of ustekinumab including but not limited to immunosuppression, malignancy, posterior leukoencephalopathy syndrome, and serious infections. The patient understands that monitoring is required including a PPD at baseline and must alert us or the primary physician if symptoms of infection or other concerning signs are noted.
Rinvoq Pregnancy And Lactation Text: Based on animal studies, Rinvoq may cause embryo-fetal harm when administered to pregnant women. The medication should not be used in pregnancy. Breastfeeding is not recommended during treatment and for 6 days after the last dose.
Valtrex Counseling: I discussed with the patient the risks of valacyclovir including but not limited to kidney damage, nausea, vomiting and severe allergy. The patient understands that if the infection seems to be worsening or is not improving, they are to call.
Cyclosporine Counseling:  I discussed with the patient the risks of cyclosporine including but not limited to hypertension, gingival hyperplasia,myelosuppression, immunosuppression, liver damage, kidney damage, neurotoxicity, lymphoma, and serious infections. The patient understands that monitoring is required including baseline blood pressure, CBC, CMP, lipid panel and uric acid, and then 1-2 times monthly CMP and blood pressure.
Winlevi Counseling:  I discussed with the patient the risks of topical clascoterone including but not limited to erythema, scaling, itching, and stinging. Patient voiced their understanding.
Otezla Counseling: Arlyce Ales Counseling: The side effects of Arlyce Ales were discussed with the patient, including but not limited to worsening or new depression, weight loss, diarrhea, nausea, upper respiratory tract infection, and headache. Patient instructed to call the office should any adverse effect occur. The patient verbalized understanding of the proper use and possible adverse effects of Otezla. All the patient's questions and concerns were addressed.
Erythromycin Counseling:  I discussed with the patient the risks of erythromycin including but not limited to GI upset, allergic reaction, drug rash, diarrhea, increase in liver enzymes, and yeast infections.

## 2023-02-24 ENCOUNTER — APPOINTMENT (RX ONLY)
Dept: URBAN - METROPOLITAN AREA CLINIC 137 | Facility: CLINIC | Age: 78
Setting detail: DERMATOLOGY
End: 2023-02-24

## 2023-02-24 DIAGNOSIS — L82.1 OTHER SEBORRHEIC KERATOSIS: ICD-10-CM | Status: UNCHANGED

## 2023-02-24 DIAGNOSIS — D18.0 HEMANGIOMA: ICD-10-CM | Status: UNCHANGED

## 2023-02-24 DIAGNOSIS — K13.0 DISEASES OF LIPS: ICD-10-CM

## 2023-02-24 DIAGNOSIS — L57.0 ACTINIC KERATOSIS: ICD-10-CM

## 2023-02-24 DIAGNOSIS — Z71.89 OTHER SPECIFIED COUNSELING: ICD-10-CM

## 2023-02-24 DIAGNOSIS — T07XXXA ABRASION OR FRICTION BURN OF OTHER, MULTIPLE, AND UNSPECIFIED SITES, WITHOUT MENTION OF INFECTION: ICD-10-CM

## 2023-02-24 DIAGNOSIS — L57.8 OTHER SKIN CHANGES DUE TO CHRONIC EXPOSURE TO NONIONIZING RADIATION: ICD-10-CM | Status: UNCHANGED

## 2023-02-24 DIAGNOSIS — L20.89 OTHER ATOPIC DERMATITIS: ICD-10-CM | Status: WELL CONTROLLED

## 2023-02-24 DIAGNOSIS — T07XXXA INSECT BITE, NONVENOMOUS, OF OTHER, MULTIPLE, AND UNSPECIFIED SITES, WITHOUT MENTION OF INFECTION: ICD-10-CM

## 2023-02-24 PROBLEM — T14.8XXA OTHER INJURY OF UNSPECIFIED BODY REGION, INITIAL ENCOUNTER: Status: ACTIVE | Noted: 2023-02-24

## 2023-02-24 PROBLEM — L30.9 DERMATITIS, UNSPECIFIED: Status: ACTIVE | Noted: 2023-02-24

## 2023-02-24 PROBLEM — T07.XXXA UNSPECIFIED MULTIPLE INJURIES, INITIAL ENCOUNTER: Status: ACTIVE | Noted: 2023-02-24

## 2023-02-24 PROBLEM — D18.01 HEMANGIOMA OF SKIN AND SUBCUTANEOUS TISSUE: Status: ACTIVE | Noted: 2023-02-24

## 2023-02-24 PROCEDURE — ? PRESCRIPTION MEDICATION MANAGEMENT

## 2023-02-24 PROCEDURE — 17003 DESTRUCT PREMALG LES 2-14: CPT

## 2023-02-24 PROCEDURE — 17000 DESTRUCT PREMALG LESION: CPT

## 2023-02-24 PROCEDURE — ? COUNSELING

## 2023-02-24 PROCEDURE — ? PRESCRIPTION

## 2023-02-24 PROCEDURE — ? SUNSCREEN RECOMMENDATIONS

## 2023-02-24 PROCEDURE — ? PHOTO-DOCUMENTATION

## 2023-02-24 PROCEDURE — ? FOLLOW UP FOR NEXT VISIT

## 2023-02-24 PROCEDURE — 99214 OFFICE O/P EST MOD 30 MIN: CPT | Mod: 25

## 2023-02-24 PROCEDURE — ? LIQUID NITROGEN

## 2023-02-24 RX ORDER — TRIAMCINOLONE ACETONIDE 1 MG/G
CREAM TOPICAL BID
Qty: 30 | Refills: 1 | Status: ERX | COMMUNITY
Start: 2023-02-24

## 2023-02-24 RX ADMIN — TRIAMCINOLONE ACETONIDE: 1 CREAM TOPICAL at 00:00

## 2023-02-24 ASSESSMENT — LOCATION SIMPLE DESCRIPTION DERM
LOCATION SIMPLE: RIGHT UPPER BACK
LOCATION SIMPLE: UPPER BACK
LOCATION SIMPLE: ABDOMEN
LOCATION SIMPLE: RIGHT FOREHEAD
LOCATION SIMPLE: RIGHT LIP
LOCATION SIMPLE: LEFT LIP
LOCATION SIMPLE: RIGHT SHOULDER
LOCATION SIMPLE: CHEST

## 2023-02-24 ASSESSMENT — LOCATION DETAILED DESCRIPTION DERM
LOCATION DETAILED: RIGHT UPPER CUTANEOUS LIP
LOCATION DETAILED: INFERIOR THORACIC SPINE
LOCATION DETAILED: RIGHT ORAL COMMISSURE
LOCATION DETAILED: RIGHT POSTERIOR SHOULDER
LOCATION DETAILED: PERIUMBILICAL SKIN
LOCATION DETAILED: LEFT MEDIAL SUPERIOR CHEST
LOCATION DETAILED: LEFT UPPER CUTANEOUS LIP
LOCATION DETAILED: RIGHT LATERAL FOREHEAD
LOCATION DETAILED: RIGHT SUPERIOR UPPER BACK

## 2023-02-24 ASSESSMENT — LOCATION ZONE DERM
LOCATION ZONE: LIP
LOCATION ZONE: FACE
LOCATION ZONE: TRUNK
LOCATION ZONE: ARM

## 2023-02-24 NOTE — PROCEDURE: PRESCRIPTION MEDICATION MANAGEMENT
Detail Level: Zone
Continue Regimen: Nystatin ointment around mouth
Render In Strict Bullet Format?: No

## 2023-02-24 NOTE — PROCEDURE: FOLLOW UP FOR NEXT VISIT
Instructions (Optional): Patient is seeing Dr Jay Winters. She was told that fluoride was most likely causing her flare ups.
Detail Level: Simple

## 2023-02-24 NOTE — PROCEDURE: MIPS QUALITY
Quality 111:Pneumonia Vaccination Status For Older Adults: Pneumococcal vaccine (PPSV23) administered on or after patientâs 60th birthday and before the end of the measurement period
Quality 226: Preventive Care And Screening: Tobacco Use: Screening And Cessation Intervention: Tobacco Screening not Performed
Quality 130: Documentation Of Current Medications In The Medical Record: Current Medications Documented
Detail Level: Detailed

## 2023-09-26 ENCOUNTER — APPOINTMENT (RX ONLY)
Dept: URBAN - METROPOLITAN AREA CLINIC 137 | Facility: CLINIC | Age: 78
Setting detail: DERMATOLOGY
End: 2023-09-26

## 2023-09-26 DIAGNOSIS — L23.9 ALLERGIC CONTACT DERMATITIS, UNSPECIFIED CAUSE: ICD-10-CM

## 2023-09-26 DIAGNOSIS — L20.89 OTHER ATOPIC DERMATITIS: ICD-10-CM | Status: WELL CONTROLLED

## 2023-09-26 DIAGNOSIS — L23.7 ALLERGIC CONTACT DERMATITIS DUE TO PLANTS, EXCEPT FOOD: ICD-10-CM

## 2023-09-26 DIAGNOSIS — L57.0 ACTINIC KERATOSIS: ICD-10-CM

## 2023-09-26 PROBLEM — L30.9 DERMATITIS, UNSPECIFIED: Status: ACTIVE | Noted: 2023-09-26

## 2023-09-26 PROCEDURE — ? COUNSELING

## 2023-09-26 PROCEDURE — 99213 OFFICE O/P EST LOW 20 MIN: CPT | Mod: 25

## 2023-09-26 PROCEDURE — ? ADDITIONAL NOTES

## 2023-09-26 PROCEDURE — ? LIQUID NITROGEN

## 2023-09-26 PROCEDURE — 17000 DESTRUCT PREMALG LESION: CPT

## 2023-09-26 ASSESSMENT — LOCATION ZONE DERM
LOCATION ZONE: FACE
LOCATION ZONE: LEG

## 2023-09-26 ASSESSMENT — LOCATION DETAILED DESCRIPTION DERM
LOCATION DETAILED: RIGHT CENTRAL BUCCAL CHEEK
LOCATION DETAILED: RIGHT DISTAL PRETIBIAL REGION
LOCATION DETAILED: RIGHT CENTRAL MALAR CHEEK
LOCATION DETAILED: LEFT DISTAL PRETIBIAL REGION
LOCATION DETAILED: RIGHT SUPERIOR CENTRAL MALAR CHEEK

## 2023-09-26 ASSESSMENT — LOCATION SIMPLE DESCRIPTION DERM
LOCATION SIMPLE: RIGHT PRETIBIAL REGION
LOCATION SIMPLE: RIGHT CHEEK
LOCATION SIMPLE: LEFT PRETIBIAL REGION

## 2023-09-26 NOTE — PROCEDURE: ADDITIONAL NOTES
Additional Notes: Possibly related to fire ants.
Render Risk Assessment In Note?: no
Detail Level: Simple
Additional Notes: Patient will consult with her dentist regarding use of non fluoride toothpaste. Patient’s rash subsided upon discontinuation of fluoride toothpaste.

## 2024-03-01 ENCOUNTER — APPOINTMENT (RX ONLY)
Dept: URBAN - METROPOLITAN AREA CLINIC 137 | Facility: CLINIC | Age: 79
Setting detail: DERMATOLOGY
End: 2024-03-01

## 2024-03-01 DIAGNOSIS — F42.4 EXCORIATION (SKIN-PICKING) DISORDER: ICD-10-CM

## 2024-03-01 DIAGNOSIS — L82.1 OTHER SEBORRHEIC KERATOSIS: ICD-10-CM

## 2024-03-01 DIAGNOSIS — Z85.828 PERSONAL HISTORY OF OTHER MALIGNANT NEOPLASM OF SKIN: ICD-10-CM

## 2024-03-01 DIAGNOSIS — K13.0 DISEASES OF LIPS: ICD-10-CM

## 2024-03-01 DIAGNOSIS — L57.8 OTHER SKIN CHANGES DUE TO CHRONIC EXPOSURE TO NONIONIZING RADIATION: ICD-10-CM

## 2024-03-01 DIAGNOSIS — T07XXXA INSECT BITE, NONVENOMOUS, OF OTHER, MULTIPLE, AND UNSPECIFIED SITES, WITHOUT MENTION OF INFECTION: ICD-10-CM

## 2024-03-01 DIAGNOSIS — L57.0 ACTINIC KERATOSIS: ICD-10-CM

## 2024-03-01 DIAGNOSIS — L81.4 OTHER MELANIN HYPERPIGMENTATION: ICD-10-CM

## 2024-03-01 PROBLEM — S90.562A INSECT BITE (NONVENOMOUS), LEFT ANKLE, INITIAL ENCOUNTER: Status: ACTIVE | Noted: 2024-03-01

## 2024-03-01 PROBLEM — S90.561A INSECT BITE (NONVENOMOUS), RIGHT ANKLE, INITIAL ENCOUNTER: Status: ACTIVE | Noted: 2024-03-01

## 2024-03-01 PROCEDURE — ? PRESCRIPTION

## 2024-03-01 PROCEDURE — ? COUNSELING

## 2024-03-01 PROCEDURE — ? LIQUID NITROGEN

## 2024-03-01 PROCEDURE — ? PRESCRIPTION MEDICATION MANAGEMENT

## 2024-03-01 PROCEDURE — 99213 OFFICE O/P EST LOW 20 MIN: CPT | Mod: 25

## 2024-03-01 PROCEDURE — 17000 DESTRUCT PREMALG LESION: CPT

## 2024-03-01 PROCEDURE — 17003 DESTRUCT PREMALG LES 2-14: CPT

## 2024-03-01 RX ORDER — MUPIROCIN 20 MG/G
OINTMENT TOPICAL
Qty: 22 | Refills: 1 | Status: ERX | COMMUNITY
Start: 2024-03-01

## 2024-03-01 RX ADMIN — MUPIROCIN: 20 OINTMENT TOPICAL at 00:00

## 2024-03-01 ASSESSMENT — LOCATION DETAILED DESCRIPTION DERM
LOCATION DETAILED: LEFT UPPER CUTANEOUS LIP
LOCATION DETAILED: LEFT DISTAL POSTERIOR THIGH
LOCATION DETAILED: LEFT SUPERIOR FRONTAL SCALP
LOCATION DETAILED: RIGHT MEDIAL SUPERIOR CHEST
LOCATION DETAILED: LEFT SUPERIOR UPPER BACK
LOCATION DETAILED: LEFT ANKLE
LOCATION DETAILED: SUBXIPHOID
LOCATION DETAILED: RIGHT ANKLE
LOCATION DETAILED: RIGHT SUPERIOR LATERAL UPPER BACK
LOCATION DETAILED: LEFT SUPERIOR VERMILION LIP

## 2024-03-01 ASSESSMENT — LOCATION ZONE DERM
LOCATION ZONE: TRUNK
LOCATION ZONE: LIP
LOCATION ZONE: LEG
LOCATION ZONE: SCALP

## 2024-03-01 ASSESSMENT — LOCATION SIMPLE DESCRIPTION DERM
LOCATION SIMPLE: LEFT ANKLE
LOCATION SIMPLE: ABDOMEN
LOCATION SIMPLE: LEFT LIP
LOCATION SIMPLE: RIGHT ANKLE
LOCATION SIMPLE: CHEST
LOCATION SIMPLE: LEFT UPPER BACK
LOCATION SIMPLE: LEFT POSTERIOR THIGH
LOCATION SIMPLE: RIGHT BACK
LOCATION SIMPLE: SCALP

## 2024-03-01 NOTE — PROCEDURE: PRESCRIPTION MEDICATION MANAGEMENT
Initiate Treatment: Triamcinolone cream mixed with Mupirocin ointment apply bid to bites
Detail Level: Zone
Render In Strict Bullet Format?: No
Continue Regimen: Lotrimin OTC PRN

## 2024-05-20 ENCOUNTER — APPOINTMENT (RX ONLY)
Dept: URBAN - METROPOLITAN AREA CLINIC 137 | Facility: CLINIC | Age: 79
Setting detail: DERMATOLOGY
End: 2024-05-20

## 2024-05-20 DIAGNOSIS — L82.1 OTHER SEBORRHEIC KERATOSIS: ICD-10-CM

## 2024-05-20 DIAGNOSIS — L23.9 ALLERGIC CONTACT DERMATITIS, UNSPECIFIED CAUSE: ICD-10-CM

## 2024-05-20 PROCEDURE — ? COUNSELING

## 2024-05-20 PROCEDURE — ? PHOTO-DOCUMENTATION

## 2024-05-20 PROCEDURE — ? PRESCRIPTION MEDICATION MANAGEMENT

## 2024-05-20 PROCEDURE — 99213 OFFICE O/P EST LOW 20 MIN: CPT

## 2024-05-20 ASSESSMENT — LOCATION ZONE DERM
LOCATION ZONE: AXILLAE
LOCATION ZONE: TRUNK

## 2024-05-20 ASSESSMENT — LOCATION DETAILED DESCRIPTION DERM
LOCATION DETAILED: RIGHT POSTERIOR AXILLA
LOCATION DETAILED: LEFT SUPERIOR UPPER BACK

## 2024-05-20 ASSESSMENT — LOCATION SIMPLE DESCRIPTION DERM
LOCATION SIMPLE: LEFT UPPER BACK
LOCATION SIMPLE: RIGHT POSTERIOR AXILLA

## 2024-05-20 NOTE — PROCEDURE: PRESCRIPTION MEDICATION MANAGEMENT
Detail Level: Zone
Render In Strict Bullet Format?: No
Initiate Treatment: Triamcinolone cream 0.1% apply bid for 1 week.  Patient has the prescription

## 2024-06-26 RX ORDER — TRIAMCINOLONE ACETONIDE 1 MG/G
CREAM TOPICAL BID
Qty: 80 | Refills: 1 | Status: ERX

## 2024-08-11 NOTE — PROGRESS NOTES
Assessment/Plan:  Problem List Items Addressed This Visit          Cardiovascular and Mediastinum    Hypertension - Primary     Stable.  Check blood pressure outside of office.  Recommend lifestyle modifications.           Relevant Medications    lisinopril (ZESTRIL) 10 mg tablet    nebivolol (BYSTOLIC) 10 mg tablet    Other Relevant Orders    Ambulatory Referral to Cardiology    CBC and differential    Comprehensive metabolic panel    Magnesium    Ambulatory Referral to Physical Therapy    POCT ECG (Completed)    Echo complete w/ contrast if indicated    Left atrial enlargement     Pending Cardio consult.           Relevant Medications    nebivolol (BYSTOLIC) 10 mg tablet    Other Relevant Orders    Ambulatory Referral to Cardiology    Echo complete w/ contrast if indicated    Mitral valve sclerosis     Pending Cardio consult.           Relevant Medications    nebivolol (BYSTOLIC) 10 mg tablet    Other Relevant Orders    Ambulatory Referral to Cardiology    Echo complete w/ contrast if indicated    Tricuspid regurgitation     Pending Cardio consult.           Relevant Medications    nebivolol (BYSTOLIC) 10 mg tablet    Other Relevant Orders    Ambulatory Referral to Cardiology    Echo complete w/ contrast if indicated    Pulmonary hypertension (HCC)     Pending Cardio consult.           Relevant Orders    Ambulatory Referral to Cardiology    Echo complete w/ contrast if indicated       Digestive    GERD (gastroesophageal reflux disease)     Stable on Protonix 40mg QD.  Pending GI consult as overdue for colonoscopy.  May be able to wean PPI in future.  Watch GERD diet.           Relevant Medications    pantoprazole (PROTONIX) 40 mg tablet    Other Relevant Orders    Magnesium    Ambulatory Referral to Gastroenterology    Ambulatory Referral to Endocrinology       Musculoskeletal and Integument    Eczema     Stable on Rx topical steroids.           Relevant Medications    mometasone (ELOCON) 0.1 % cream     triamcinolone (KENALOG) 0.1 % cream    clotrimazole (LOTRIMIN) 1 % cream    Other Relevant Orders    Ambulatory Referral to Dermatology    Osteoporosis     Pending Endo consult to discuss treatment options.           Relevant Orders    Vitamin D 25 hydroxy    Ambulatory Referral to Endocrinology       Behavioral Health    Anxiety     Stable.  D/C Valium 5mg BID PRN due to potential side effects.              Other    Overweight     Recommend lifestyle modifications.           Hyperlipidemia     Pending labs.  Not on statin currently.  Recommend lifestyle modifications.           Relevant Orders    Ambulatory Referral to Cardiology    CBC and differential    Comprehensive metabolic panel    Lipid panel    TSH, 3rd generation with Free T4 reflex    LDL cholesterol, direct     Other Visit Diagnoses       Encounter for immunization        Postmenopausal        Chest pressure        Relevant Orders    Ambulatory Referral to Cardiology    POCT ECG (Completed) -  Stable    Echo complete w/ contrast if indicated    Pending Cardio consult.  May need increase of BB as relieves symptoms.      Sinus bradycardia at 49 bpm.  Left axis deviation.  Possible left atrial enlargement.  Left ventricular hypertrophy.  No acute ST elevation or depression.  No acute T wave inversion.  No prior EKG for comparison.         SOB (shortness of breath)        Relevant Orders    Ambulatory Referral to Cardiology    POCT ECG (Completed)    Echo complete w/ contrast if indicated    See above.        IFG (impaired fasting glucose)        Relevant Orders    Hemoglobin A1C    Food impaction of esophagus, initial encounter        Relevant Medications    pantoprazole (PROTONIX) 40 mg tablet    Other Relevant Orders    Ambulatory Referral to Gastroenterology    Ambulatory Referral to Endocrinology    Physical deconditioning        Relevant Orders    Ambulatory Referral to Physical Therapy          Sinus bradycardia at 49 bpm.  Left axis deviation.   Possible left atrial enlargement.  Left ventricular hypertrophy.  No acute ST elevation or depression.  No acute T wave inversion.  No prior EKG for comparison.       Return in about 6 weeks (around 2024) for AWV?/F/U HTN, Anxiety, CKD, GERD, Labs.      Future Appointments   Date Time Provider Department Center   2024 10:00 AM [facility] ECHO 1 [facility] [address]   10/2/2024 11:20 AM Carline Howard DO  [facility]   10/17/2024  8:40 AM Vince Merritt MD [facility]          Subjective:     Chloé is a 79 y.o. female who presents today as a new patient for her medical conditions.      New Patient    Previous PCP:  Dr. Porsha Winn at [facility]  Reason for Transfer:  Patient moved from [place]  Last seen by previous PCP:  10/23  Last Labs:  11/3/239/21/23  Last Physical:  AWV 23  Medical Records Requested:   Yes / No      HPI:  Chief Complaint   Patient presents with   • New Patient Visit     -- Above per clinical staff and reviewed. --      HPI      Today:      Overweight - Watching diet.  +Exercise - Home video for 20 minutes, 7 days per week.       HTN - On Lisinopril 10mg QD - has a cough, Bystolic 10mg QD.  Now has recurrent chest tightness and SOB, relieved with taking BB at night.  No BP check outside of office.  D/C Metoprolol ER 25mg QD due to bradycardia.      Hyperlipidemia - No statin previously.      Anxiety - Previously on Valium 5mg BID PRN per previous PCP - last dose?, taking?  Using for flying, pre-op anxiety.  Was previously Rx 15 pills per year by prior PCP.      PHQ-2/9 Depression Screening    Little interest or pleasure in doing things: 0 - not at all  Feeling down, depressed, or hopeless: 0 - not at all  Trouble falling or staying asleep, or sleeping too much: 0 - not at all  Feeling tired or having little energy: 0 - not at all  Poor appetite or overeatin - not at all  Feeling bad about yourself - or that you are a failure or have let  "yourself or your family down: 0 - not at all  Trouble concentrating on things, such as reading the newspaper or watching television: 0 - not at all  Moving or speaking so slowly that other people could have noticed. Or the opposite - being so fidgety or restless that you have been moving around a lot more than usual: 0 - not at all  Thoughts that you would be better off dead, or of hurting yourself in some way: 0 - not at all  PHQ-2 Score: 0  PHQ-2 Interpretation: Negative depression screen  PHQ-9 Score: 0  PHQ-9 Interpretation: No or Minimal depression       ESTEFANI-7 Flowsheet Screening    Flowsheet Row Most Recent Value   Over the last two weeks, how often have you been bothered by the following problems?     Feeling nervous, anxious, or on edge 0   Not being able to stop or control worrying 0   Worrying too much about different things 0   Trouble relaxing  0   Being so restless that it's hard to sit still 0   Becoming easily annoyed or irritable  0   Feeling afraid as if something awful might happen 0   How difficult have these problems made it for you to do your work, take care of things at home, or get along with other people?  Not difficult at all   ESTEFANI Score  0        MDQ:  0, Asynchronous, No Problem    Last Echo 6/24, due to repeat 9/24.  Management per Cardio.      GERD / H/O Food Impaction - On Protonix 40mg QD.  Last EGD 2022- was supped to be done 8/23, but D/C due to uncontrolled BP.   No other GERD meds previously?  Watching GERD diet.      Osteoporosis - Last Dexa 2023.  Patient has not been on meds previously - Prolia was advised.  She states she was supposed to have repeat Dexa 9/24 for \"just cause.\"      Eczema - On Elocon BID PRN ear eczema.  Uses twice yearly.      Reviewed:  Labs 11/3/23, 9/21/23, Echo 11/21/22, Stress Test 12/30/22    No Gyn.  S/p Lap Total Hysterectomy due to Uterine Prolapase.          The following portions of the patient's history were reviewed and updated as appropriate: " "allergies, current medications, past family history, past medical history, past social history, past surgical history and problem list.      Review of Systems   Constitutional:  Negative for appetite change, chills, diaphoresis, fatigue and fever.   Respiratory:  Positive for cough (Dry) and chest tightness. Negative for shortness of breath.    Cardiovascular:  Negative for chest pain.   Gastrointestinal:  Negative for abdominal pain, blood in stool, diarrhea, nausea and vomiting.   Genitourinary:  Negative for dysuria.        Current Outpatient Medications   Medication Sig Dispense Refill   • lisinopril (ZESTRIL) 10 mg tablet Take 10 mg by mouth daily Rx per Cardio     • mometasone (ELOCON) 0.1 % cream Apply topically 2 (two) times a day as needed (Ear Eczema) 15 g 0   • nebivolol (BYSTOLIC) 10 mg tablet Take 10 mg by mouth daily Rx per Cardio     • pantoprazole (PROTONIX) 40 mg tablet Take 40 mg by mouth daily Rx per GI     • triamcinolone (KENALOG) 0.1 % cream Apply 1 Application topically 2 (two) times a day as needed for rash     • clotrimazole (LOTRIMIN) 1 % cream Apply topically if needed (Patient not taking: Reported on 8/12/2024)       No current facility-administered medications for this visit.       Objective:  /84 (BP Location: Left arm, Patient Position: Sitting, Cuff Size: Standard)   Pulse 60   Temp (!) 97.2 °F (36.2 °C) (Temporal)   Ht 5' 1\" (1.549 m)   Wt 60.3 kg (133 lb)   SpO2 99%   BMI 25.13 kg/m²    Wt Readings from Last 3 Encounters:   08/12/24 60.3 kg (133 lb)      BP Readings from Last 3 Encounters:   08/12/24 142/84          Physical Exam  Vitals and nursing note reviewed.   Constitutional:       Appearance: Normal appearance. She is well-developed and normal weight.   HENT:      Head: Normocephalic and atraumatic.      Right Ear: External ear normal. There is impacted cerumen.      Left Ear: Tympanic membrane, ear canal and external ear normal.      Nose: Nose normal.      Right " "Sinus: No maxillary sinus tenderness or frontal sinus tenderness.      Left Sinus: No maxillary sinus tenderness or frontal sinus tenderness.      Mouth/Throat:      Pharynx: Uvula midline.      Tonsils: No tonsillar exudate.   Eyes:      Extraocular Movements: Extraocular movements intact.      Conjunctiva/sclera: Conjunctivae normal.   Neck:      Vascular: No carotid bruit.   Cardiovascular:      Rate and Rhythm: Normal rate and regular rhythm.      Pulses: Normal pulses.      Heart sounds: Normal heart sounds.   Pulmonary:      Effort: Pulmonary effort is normal.      Breath sounds: Normal breath sounds.   Abdominal:      General: Bowel sounds are normal. There is no distension.      Palpations: Abdomen is soft. There is no mass.      Tenderness: There is no abdominal tenderness. There is no guarding or rebound.   Musculoskeletal:         General: No swelling or tenderness.      Cervical back: Neck supple.      Right lower leg: No edema.      Left lower leg: No edema.   Lymphadenopathy:      Cervical: No cervical adenopathy.   Skin:     Findings: No rash.   Neurological:      General: No focal deficit present.      Mental Status: She is alert and oriented to person, place, and time.   Psychiatric:         Mood and Affect: Mood normal.         Behavior: Behavior normal.         Thought Content: Thought content normal.         Judgment: Judgment normal.         Lab Results:      No results found for: \"WBC\", \"HGB\", \"HCT\", \"PLT\", \"CHOL\", \"TRIG\", \"HDL\", \"LDLDIRECT\", \"ALT\", \"AST\", \"NA\", \"K\", \"CL\", \"CREATININE\", \"BUN\", \"CO2\", \"TSH\", \"PSA\", \"INR\", \"GLUF\", \"HGBA1C\", \"MICROALBUR\"  No results found for: \"URICACID\"  Invalid input(s): \"BASENAME\" Vitamin D    No results found.     POCT Labs               5 minutes spent on chart prep, 55 minutes spent with patient counseling/educating on their diagnoses, tests completed and any new tests ordered, any referrals placed, treatment options, and documentation of above today.   In " prescribing new medications, or changing doses, we reviewed the risks and benefits and side effects of these medications along with other treatment options if appropriate.

## 2024-08-12 ENCOUNTER — OFFICE VISIT (OUTPATIENT)
Dept: FAMILY MEDICINE CLINIC | Facility: CLINIC | Age: 79
End: 2024-08-12
Payer: MEDICARE

## 2024-08-12 VITALS
SYSTOLIC BLOOD PRESSURE: 142 MMHG | TEMPERATURE: 97.2 F | WEIGHT: 133 LBS | BODY MASS INDEX: 25.11 KG/M2 | HEART RATE: 60 BPM | HEIGHT: 61 IN | OXYGEN SATURATION: 99 % | DIASTOLIC BLOOD PRESSURE: 84 MMHG
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
## 2024-08-12 DIAGNOSIS — I27.20 PULMONARY HYPERTENSION (HCC): ICD-10-CM

## 2024-08-12 DIAGNOSIS — R53.81 PHYSICAL DECONDITIONING: ICD-10-CM

## 2024-08-12 DIAGNOSIS — L30.9 ECZEMA, UNSPECIFIED TYPE: ICD-10-CM

## 2024-08-12 DIAGNOSIS — I10 PRIMARY HYPERTENSION: Primary | ICD-10-CM

## 2024-08-12 DIAGNOSIS — E78.49 OTHER HYPERLIPIDEMIA: ICD-10-CM

## 2024-08-12 DIAGNOSIS — T18.128A FOOD IMPACTION OF ESOPHAGUS, INITIAL ENCOUNTER: ICD-10-CM

## 2024-08-12 DIAGNOSIS — K21.9 GASTROESOPHAGEAL REFLUX DISEASE, UNSPECIFIED WHETHER ESOPHAGITIS PRESENT: ICD-10-CM

## 2024-08-12 DIAGNOSIS — R73.01 IFG (IMPAIRED FASTING GLUCOSE): ICD-10-CM

## 2024-08-12 DIAGNOSIS — I51.7 LEFT ATRIAL ENLARGEMENT: ICD-10-CM

## 2024-08-12 DIAGNOSIS — R06.02 SOB (SHORTNESS OF BREATH): ICD-10-CM

## 2024-08-12 DIAGNOSIS — R07.89 CHEST PRESSURE: ICD-10-CM

## 2024-08-12 DIAGNOSIS — M81.0 OSTEOPOROSIS, UNSPECIFIED OSTEOPOROSIS TYPE, UNSPECIFIED PATHOLOGICAL FRACTURE PRESENCE: ICD-10-CM

## 2024-08-12 DIAGNOSIS — I07.1 TRICUSPID VALVE INSUFFICIENCY, UNSPECIFIED ETIOLOGY: ICD-10-CM

## 2024-08-12 DIAGNOSIS — Z23 ENCOUNTER FOR IMMUNIZATION: ICD-10-CM

## 2024-08-12 DIAGNOSIS — I05.8 MITRAL VALVE SCLEROSIS: ICD-10-CM

## 2024-08-12 DIAGNOSIS — Z78.0 POSTMENOPAUSAL: ICD-10-CM

## 2024-08-12 DIAGNOSIS — E66.3 OVERWEIGHT: ICD-10-CM

## 2024-08-12 DIAGNOSIS — F41.9 ANXIETY: ICD-10-CM

## 2024-08-12 PROCEDURE — 93000 ELECTROCARDIOGRAM COMPLETE: CPT | Performed by: FAMILY MEDICINE

## 2024-08-12 PROCEDURE — 99205 OFFICE O/P NEW HI 60 MIN: CPT | Performed by: FAMILY MEDICINE

## 2024-08-12 RX ORDER — TRIAMCINOLONE ACETONIDE 1 MG/G
1 CREAM TOPICAL 2 TIMES DAILY PRN
COMMUNITY
Start: 2024-06-26

## 2024-08-12 RX ORDER — LISINOPRIL 10 MG/1
10 TABLET ORAL DAILY
COMMUNITY
Start: 2024-03-13

## 2024-08-12 RX ORDER — DIAZEPAM 5 MG
5 TABLET ORAL 2 TIMES DAILY PRN
COMMUNITY
Start: 2024-06-11 | End: 2024-08-12

## 2024-08-12 RX ORDER — CLOTRIMAZOLE 1 %
CREAM (GRAM) TOPICAL AS NEEDED
COMMUNITY

## 2024-08-12 RX ORDER — NEBIVOLOL 10 MG/1
10 TABLET ORAL DAILY
COMMUNITY
Start: 2024-03-13

## 2024-08-12 RX ORDER — MOMETASONE FUROATE 1 MG/G
1 CREAM TOPICAL 2 TIMES DAILY PRN
COMMUNITY
Start: 2024-08-07 | End: 2024-08-12 | Stop reason: SDUPTHER

## 2024-08-12 RX ORDER — PANTOPRAZOLE SODIUM 40 MG/10ML
40 INJECTION, POWDER, LYOPHILIZED, FOR SOLUTION INTRAVENOUS DAILY
COMMUNITY
Start: 2021-03-17 | End: 2024-08-12

## 2024-08-12 RX ORDER — ZOSTER VACCINE RECOMBINANT, ADJUVANTED 50 MCG/0.5
0.5 KIT INTRAMUSCULAR ONCE
Qty: 1 EACH | Refills: 0 | Status: SHIPPED | OUTPATIENT
Start: 2024-08-12 | End: 2024-08-12

## 2024-08-12 RX ORDER — MOMETASONE FUROATE 1 MG/G
CREAM TOPICAL 2 TIMES DAILY PRN
Qty: 15 G | Refills: 0 | Status: SHIPPED | OUTPATIENT
Start: 2024-08-12

## 2024-08-12 RX ORDER — PANTOPRAZOLE SODIUM 40 MG/1
40 TABLET, DELAYED RELEASE ORAL DAILY
COMMUNITY

## 2024-08-14 NOTE — ASSESSMENT & PLAN NOTE
Stable on Protonix 40mg QD.  Pending GI consult as overdue for colonoscopy.  May be able to wean PPI in future.  Watch GERD diet.

## 2024-09-06 ENCOUNTER — HOSPITAL ENCOUNTER (OUTPATIENT)
Dept: NON INVASIVE DIAGNOSTICS | Facility: CLINIC | Age: 79
Discharge: HOME | End: 2024-09-06
Payer: MEDICARE

## 2024-09-06 VITALS
HEART RATE: 60 BPM | SYSTOLIC BLOOD PRESSURE: 142 MMHG | DIASTOLIC BLOOD PRESSURE: 84 MMHG | BODY MASS INDEX: 25.11 KG/M2 | WEIGHT: 133 LBS | HEIGHT: 61 IN

## 2024-09-06 DIAGNOSIS — I51.7 LEFT ATRIAL ENLARGEMENT: ICD-10-CM

## 2024-09-06 DIAGNOSIS — I05.8 MITRAL VALVE SCLEROSIS: ICD-10-CM

## 2024-09-06 DIAGNOSIS — I10 PRIMARY HYPERTENSION: ICD-10-CM

## 2024-09-06 DIAGNOSIS — I27.20 PULMONARY HYPERTENSION (HCC): ICD-10-CM

## 2024-09-06 DIAGNOSIS — I07.1 TRICUSPID VALVE INSUFFICIENCY, UNSPECIFIED ETIOLOGY: ICD-10-CM

## 2024-09-06 DIAGNOSIS — R06.02 SOB (SHORTNESS OF BREATH): ICD-10-CM

## 2024-09-06 DIAGNOSIS — R07.89 CHEST PRESSURE: ICD-10-CM

## 2024-09-06 PROBLEM — I35.1 AORTIC REGURGITATION: Status: ACTIVE | Noted: 2024-09-06

## 2024-09-06 PROBLEM — I51.89 GRADE II DIASTOLIC DYSFUNCTION: Status: ACTIVE | Noted: 2024-09-06

## 2024-09-06 PROBLEM — I34.0 MITRAL REGURGITATION: Status: ACTIVE | Noted: 2024-09-06

## 2024-09-06 LAB
AORTIC ROOT: 3.1 CM
APICAL FOUR CHAMBER EJECTION FRACTION: 53 %
ASCENDING AORTA: 3.2 CM
BSA FOR ECHO PROCEDURE: 1.59 M2
E WAVE DECELERATION TIME: 186 MS
E/A RATIO: 1.81
FRACTIONAL SHORTENING: 28 (ref 28–44)
INTERVENTRICULAR SEPTUM IN DIASTOLE (PARASTERNAL SHORT AXIS VIEW): 1.1 CM
INTERVENTRICULAR SEPTUM: 1.1 CM (ref 0.6–1.1)
LAAS-AP2: 25.2 CM2
LAAS-AP4: 26.4 CM2
LEFT ATRIUM SIZE: 4.5 CM
LEFT ATRIUM VOLUME (MOD BIPLANE): 90 ML
LEFT ATRIUM VOLUME INDEX (MOD BIPLANE): 56.6 ML/M2
LEFT INTERNAL DIMENSION IN SYSTOLE: 3.3 CM (ref 2.1–4)
LEFT VENTRICULAR INTERNAL DIMENSION IN DIASTOLE: 4.6 CM (ref 3.5–6)
LEFT VENTRICULAR POSTERIOR WALL IN END DIASTOLE: 1 CM
LEFT VENTRICULAR STROKE VOLUME: 55 ML
LVSV (TEICH): 55 ML
MV E'TISSUE VEL-SEP: 5 CM/S
MV PEAK A VEL: 0.43 M/S
MV PEAK E VEL: 78 CM/S
MV STENOSIS PRESSURE HALF TIME: 54 MS
MV VALVE AREA P 1/2 METHOD: 4.1
RA PRESSURE ESTIMATED: 5 MMHG
RIGHT ATRIUM AREA SYSTOLE A4C: 17.6 CM2
RIGHT VENTRICLE ID DIMENSION: 3.7 CM
RV PSP: 45 MMHG
SL CV LEFT ATRIUM LENGTH A2C: 5.9 CM
SL CV LV EF: 55
SL CV PED ECHO LEFT VENTRICLE DIASTOLIC VOLUME (MOD BIPLANE) 2D: 99 ML
SL CV PED ECHO LEFT VENTRICLE SYSTOLIC VOLUME (MOD BIPLANE) 2D: 44 ML
TR MAX PG: 40 MMHG
TR PEAK VELOCITY: 3.2 M/S
TRICUSPID ANNULAR PLANE SYSTOLIC EXCURSION: 2.6 CM
TRICUSPID VALVE PEAK REGURGITATION VELOCITY: 3.16 M/S

## 2024-09-06 PROCEDURE — 93306 TTE W/DOPPLER COMPLETE: CPT

## 2024-09-06 PROCEDURE — 93306 TTE W/DOPPLER COMPLETE: CPT | Performed by: INTERNAL MEDICINE

## 2024-09-06 NOTE — RESULT ENCOUNTER NOTE
Echocardiogram shows good squeezing function of the heart.  There is moderate abnormality in diastolic function, or relaxation function of the heart.  The left atrium is severely dilated or enlarged.  There is regurgitation or backflow of blood that is mild at the aortic and tricuspid valves, moderate at the mitral valve.    Continue plan of care-cardiology consult.    Message sent to patient via [employer] patient portal.

## 2024-09-09 ENCOUNTER — TELEPHONE (OUTPATIENT)
Age: 79
End: 2024-09-09

## 2024-09-09 NOTE — TELEPHONE ENCOUNTER
Pt requested that her recent cardiogram results be sent to:       Attn: Dr. Garcia  Fax [phone]  Please contact pt when it is sent. Thank you.      CARL pt gave a compliment of how wonderful our medical  system is.

## 2024-09-10 ENCOUNTER — APPOINTMENT (EMERGENCY)
Dept: RADIOLOGY | Facility: HOSPITAL | Age: 79
End: 2024-09-10
Payer: MEDICARE

## 2024-09-10 ENCOUNTER — HOSPITAL ENCOUNTER (EMERGENCY)
Facility: HOSPITAL | Age: 79
Discharge: HOME | End: 2024-09-10
Attending: EMERGENCY MEDICINE
Payer: MEDICARE

## 2024-09-10 VITALS
OXYGEN SATURATION: 98 % | TEMPERATURE: 97.7 F | RESPIRATION RATE: 20 BRPM | DIASTOLIC BLOOD PRESSURE: 86 MMHG | HEART RATE: 56 BPM | SYSTOLIC BLOOD PRESSURE: 190 MMHG

## 2024-09-10 DIAGNOSIS — R07.9 CHEST PAIN: Primary | ICD-10-CM

## 2024-09-10 DIAGNOSIS — I10 HYPERTENSION: ICD-10-CM

## 2024-09-10 LAB
2HR DELTA HS TROPONIN: 1 NG/L
ALBUMIN SERPL BCG-MCNC: 4.1 G/DL (ref 3.5–5)
ALP SERPL-CCNC: 64 U/L (ref 34–104)
ALT SERPL W P-5'-P-CCNC: 11 U/L (ref 7–52)
ANION GAP SERPL CALCULATED.3IONS-SCNC: 9 MMOL/L (ref 4–13)
AST SERPL W P-5'-P-CCNC: 20 U/L (ref 13–39)
BASOPHILS # BLD AUTO: 0.07 THOUSANDS/ÂΜL (ref 0–0.1)
BASOPHILS NFR BLD AUTO: 1 % (ref 0–1)
BILIRUB SERPL-MCNC: 0.61 MG/DL (ref 0.2–1)
BNP SERPL-MCNC: 313 PG/ML (ref 0–100)
BUN SERPL-MCNC: 18 MG/DL (ref 5–25)
CALCIUM SERPL-MCNC: 9.3 MG/DL (ref 8.4–10.2)
CARDIAC TROPONIN I PNL SERPL HS: 5 NG/L
CARDIAC TROPONIN I PNL SERPL HS: 6 NG/L
CHLORIDE SERPL-SCNC: 98 MMOL/L (ref 96–108)
CO2 SERPL-SCNC: 25 MMOL/L (ref 21–32)
CREAT SERPL-MCNC: 0.89 MG/DL (ref 0.6–1.3)
EOSINOPHIL # BLD AUTO: 0.09 THOUSAND/ÂΜL (ref 0–0.61)
EOSINOPHIL NFR BLD AUTO: 1 % (ref 0–6)
ERYTHROCYTE [DISTWIDTH] IN BLOOD BY AUTOMATED COUNT: 13 % (ref 11.6–15.1)
GFR SERPL CREATININE-BSD FRML MDRD: 60 ML/MIN/1.73SQ M
GLUCOSE SERPL-MCNC: 146 MG/DL (ref 65–140)
HCT VFR BLD AUTO: 38.7 % (ref 34.8–46.1)
HGB BLD-MCNC: 13.1 G/DL (ref 11.5–15.4)
IMM GRANULOCYTES # BLD AUTO: 0.03 THOUSAND/UL (ref 0–0.2)
IMM GRANULOCYTES NFR BLD AUTO: 0 % (ref 0–2)
LYMPHOCYTES # BLD AUTO: 1.05 THOUSANDS/ÂΜL (ref 0.6–4.47)
LYMPHOCYTES NFR BLD AUTO: 16 % (ref 14–44)
MCH RBC QN AUTO: 31.2 PG (ref 26.8–34.3)
MCHC RBC AUTO-ENTMCNC: 33.9 G/DL (ref 31.4–37.4)
MCV RBC AUTO: 92 FL (ref 82–98)
MONOCYTES # BLD AUTO: 0.55 THOUSAND/ÂΜL (ref 0.17–1.22)
MONOCYTES NFR BLD AUTO: 8 % (ref 4–12)
NEUTROPHILS # BLD AUTO: 4.95 THOUSANDS/ÂΜL (ref 1.85–7.62)
NEUTS SEG NFR BLD AUTO: 74 % (ref 43–75)
NRBC BLD AUTO-RTO: 0 /100 WBCS
PLATELET # BLD AUTO: 221 THOUSANDS/UL (ref 149–390)
PMV BLD AUTO: 9.6 FL (ref 8.9–12.7)
POTASSIUM SERPL-SCNC: 4 MMOL/L (ref 3.5–5.3)
PROT SERPL-MCNC: 7 G/DL (ref 6.4–8.4)
RBC # BLD AUTO: 4.2 MILLION/UL (ref 3.81–5.12)
SODIUM SERPL-SCNC: 132 MMOL/L (ref 135–147)
WBC # BLD AUTO: 6.74 THOUSAND/UL (ref 4.31–10.16)

## 2024-09-10 PROCEDURE — 85025 COMPLETE CBC W/AUTO DIFF WBC: CPT

## 2024-09-10 PROCEDURE — 93005 ELECTROCARDIOGRAM TRACING: CPT

## 2024-09-10 PROCEDURE — 84484 ASSAY OF TROPONIN QUANT: CPT

## 2024-09-10 PROCEDURE — 83880 ASSAY OF NATRIURETIC PEPTIDE: CPT

## 2024-09-10 PROCEDURE — 99285 EMERGENCY DEPT VISIT HI MDM: CPT

## 2024-09-10 PROCEDURE — 71045 X-RAY EXAM CHEST 1 VIEW: CPT

## 2024-09-10 PROCEDURE — 99285 EMERGENCY DEPT VISIT HI MDM: CPT | Performed by: EMERGENCY MEDICINE

## 2024-09-10 PROCEDURE — 80053 COMPREHEN METABOLIC PANEL: CPT

## 2024-09-10 PROCEDURE — 36415 COLL VENOUS BLD VENIPUNCTURE: CPT

## 2024-09-10 PROCEDURE — 76604 US EXAM CHEST: CPT | Performed by: EMERGENCY MEDICINE

## 2024-09-10 NOTE — ED ATTENDING ATTESTATION
9/10/2024  I, Michele Miranda MD, saw and evaluated the patient. I have discussed the patient with the resident/non-physician practitioner and agree with the resident's/non-physician practitioner's findings, Plan of Care, and MDM as documented in the resident's/non-physician practitioner's note, except where noted. All available labs and Radiology studies were reviewed.  I was present for key portions of any procedure(s) performed by the resident/non-physician practitioner and I was immediately available to provide assistance.       At this point I agree with the current assessment done in the Emergency Department.  I have conducted an independent evaluation of this patient a history and physical is as follows: Atypical chest discomfort, worse over the weekend, somewhat resolved now.  Moving from [place] to [place], has not yet established care with cardiology.  Called her cardiologist was asked to come to the ER for evaluation.  She reports resolution of her symptoms prior to my evaluation.  EKG with no acute ischemia.  Troponin x 2 not uptrending.  Per heart score, stable for outpatient management.    Results Reviewed       Procedure Component Value Units Date/Time    HS Troponin I 2hr [[id number]]  (Normal) Collected: 09/10/24 1453    Lab Status: Final result Specimen: Blood from Arm, Left Updated: 09/10/24 1523     hs TnI 2hr 6 ng/L      Delta 2hr hsTnI 1 ng/L     HS Troponin I 4hr [[id number]]     Lab Status: No result Specimen: Blood     HS Troponin 0hr (reflex protocol) [[id number]]  (Normal) Collected: 09/10/24 1234    Lab Status: Final result Specimen: Blood from Arm, Left Updated: 09/10/24 1317     hs TnI 0hr 5 ng/L     B-Type Natriuretic Peptide(BNP) [[id number]]  (Abnormal) Collected: 09/10/24 1234    Lab Status: Final result Specimen: Blood from Arm, Left Updated: 09/10/24 1315      pg/mL     Comprehensive metabolic panel [[id number]]  (Abnormal) Collected: 09/10/24 1234    Lab Status:  Final result Specimen: Blood from Arm, Left Updated: 09/10/24 1309     Sodium 132 mmol/L      Potassium 4.0 mmol/L      Chloride 98 mmol/L      CO2 25 mmol/L      ANION GAP 9 mmol/L      BUN 18 mg/dL      Creatinine 0.89 mg/dL      Glucose 146 mg/dL      Calcium 9.3 mg/dL      AST 20 U/L      ALT 11 U/L      Alkaline Phosphatase 64 U/L      Total Protein 7.0 g/dL      Albumin 4.1 g/dL      Total Bilirubin 0.61 mg/dL      eGFR 60 ml/min/1.73sq m     Narrative:      National Kidney Disease Foundation guidelines for Chronic Kidney Disease (CKD):     Stage 1 with normal or high GFR (GFR > 90 mL/min/1.73 square meters)    Stage 2 Mild CKD (GFR = 60-89 mL/min/1.73 square meters)    Stage 3A Moderate CKD (GFR = 45-59 mL/min/1.73 square meters)    Stage 3B Moderate CKD (GFR = 30-44 mL/min/1.73 square meters)    Stage 4 Severe CKD (GFR = 15-29 mL/min/1.73 square meters)    Stage 5 End Stage CKD (GFR <15 mL/min/1.73 square meters)  Note: GFR calculation is accurate only with a steady state creatinine    CBC and differential [[id number]] Collected: 09/10/24 1234    Lab Status: Final result Specimen: Blood from Arm, Left Updated: 09/10/24 1254     WBC 6.74 Thousand/uL      RBC 4.20 Million/uL      Hemoglobin 13.1 g/dL      Hematocrit 38.7 %      MCV 92 fL      MCH 31.2 pg      MCHC 33.9 g/dL      RDW 13.0 %      MPV 9.6 fL      Platelets 221 Thousands/uL      nRBC 0 /100 WBCs      Segmented % 74 %      Immature Grans % 0 %      Lymphocytes % 16 %      Monocytes % 8 %      Eosinophils Relative 1 %      Basophils Relative 1 %      Absolute Neutrophils 4.95 Thousands/µL      Absolute Immature Grans 0.03 Thousand/uL      Absolute Lymphocytes 1.05 Thousands/µL      Absolute Monocytes 0.55 Thousand/µL      Eosinophils Absolute 0.09 Thousand/µL      Basophils Absolute 0.07 Thousands/µL           XR chest 1 view portable   ED Interpretation by Michele Miranda MD (09/10 1418)   No acute cardiac or pulmonary abnormality identified  amendment evaluation of x-ray.      Final Result by Kaleb Degroot MD (09/10 1555)      No active pulmonary disease.            Workstation performed: [id number]               ED Course         Critical Care Time  Procedures

## 2024-09-10 NOTE — ED PROVIDER NOTES
1. Chest pain    2. Hypertension      ED Disposition       ED Disposition   Discharge    Condition   Stable    Date/Time   Tue Sep 10, 2024  3:35 PM    Comment   Chloé Anderson discharge to home/self care.                   Assessment & Plan       Medical Decision Making  80 yo F presented to ED for CP. Associated symptoms: SOB. Decreased appetite. Exam findings: murmur but otherwise normal exam.  Differentials diagnoses considered: ACS vs dysrhythmia vs CHF vs pulmonary edema vs hypertensive emergency. Patient is currently symptomatic at this time. See ED course for more details on labs and imaging interpretations. Based on these results and H&P,CP, Hypertension. Results and clinical impressions were discussed with patient and family. They expressed understanding. Plan: d/c home with strict return precautions, instructed contact cardiology to try move up appointment, referral placed to cardiology to help expedite patient being seen sooner, instructed to f/up with PCP. This plan was also discussed with patient, who was agreeable with this plan. Patient was given the opportunity to ask questions in ED. All questions and concerns were addressed in ED.     Amount and/or Complexity of Data Reviewed  Labs: ordered. Decision-making details documented in ED Course.  Radiology: ordered and independent interpretation performed. Decision-making details documented in ED Course.          HEART Risk Score      Flowsheet Row Most Recent Value   Heart Score Risk Calculator    History 1 Filed at: 09/10/2024 1519   ECG 1 Filed at: 09/10/2024 1519   Age 2 Filed at: 09/10/2024 1519   Risk Factors 2 Filed at: 09/10/2024 1519   Troponin 0 Filed at: 09/10/2024 1519   HEART Score 6 Filed at: 09/10/2024 1519               ED Course as of 09/13/24 2009   Tue Sep 10, 2024   1303 ECHO 9/6/24    ·  Left Ventricle: Left ventricular cavity size is normal. Wall thickness is normal. The left ventricular ejection fraction is 55%. Systolic function is  normal. Wall motion is normal. Diastolic function is moderately abnormal, consistent with grade II (pseudonormal) relaxation.  ·  IVS: There is sigmoid appearance of the septum.  ·  Left Atrium: The atrium is severely dilated (>48 mL/m2).  ·  Aortic Valve: There is mild regurgitation.  ·  Mitral Valve: There is mild thickening limited to the leaflet margin. The annulus is mildly dilated. There is moderate regurgitation with a centrally directed jet.  ·  Tricuspid Valve: There is mild regurgitation. The right ventricular systolic pressure is mildly to moderately elevated. The estimated right ventricular systolic pressure is 45.00 mmHg.     1304 CBC and differential  WNL   1310 Comprehensive metabolic panel(!)  Slightly elevated glucose. Otherwise unremarkable   1320 BNP(!): 313  unremarkable   1323 hs TnI 0hr: 5   1323 Blood Pressure(!): 202/88  Patient is asymptomatic at this time   1357 Blood Pressure(!): 178/77  Improved from prior BP, has recorded elevated BP at home.    1432 XR chest 1 view portable  No acute cardiac or pulmonary abnormality identified amendment evaluation of x-ray.   1530 hs TnI 2hr: 6  Delta 1, less likely ACS       Medications - No data to display    History of Present Illness       HPI    Chloé Anderson is a 79 y.o. female who identifies as a female presenting to the Emergency Department for CP. Patient moved from [place] 2 months ago, recently closing on a house. Has had episodes band like chest pain with associated SOB. Episodes last hours to minutes. Patient notices her BP is elevated during these episodes. Patient sts she will take her nebivolol and symptoms will improve. Associated decreased PO intake. Recent [facility] Se[facility] cardi[facility] on 10/17/24, Dr. Merritt. No fevers, chills, abdominal pain, NV, bowel/bladder changes.    Review of Systems   Constitutional:  Positive for appetite change. Negative for chills and fever.   HENT:  Negative for ear pain and sore throat.    Eyes:   Negative for pain and visual disturbance.   Respiratory:  Positive for shortness of breath. Negative for cough.    Cardiovascular:  Positive for chest pain. Negative for palpitations and leg swelling.   Gastrointestinal:  Negative for abdominal pain, constipation, diarrhea, nausea and vomiting.   Genitourinary:  Negative for dysuria and hematuria.   Musculoskeletal:  Negative for arthralgias and back pain.   Skin:  Negative for color change and rash.   Neurological:  Negative for seizures and syncope.   All other systems reviewed and are negative.          Objective     ED Triage Vitals   Temperature Pulse Blood Pressure Respirations SpO2 Patient Position - Orthostatic VS   09/10/24 1200 09/10/24 1158 09/10/24 1158 09/10/24 1158 09/10/24 1158 09/10/24 1158   97.7 °F (36.5 °C) 82 (!) 223/101 20 98 % Sitting      Temp Source Heart Rate Source BP Location FiO2 (%) Pain Score    09/10/24 1158 09/10/24 1158 09/10/24 1158 -- --    Oral Monitor Right arm          Physical Exam  Vitals and nursing note reviewed.   Constitutional:       General: She is awake. She is not in acute distress.     Appearance: She is not ill-appearing or toxic-appearing.   HENT:      Head: Normocephalic and atraumatic.      Right Ear: External ear normal.      Left Ear: External ear normal.      Nose: No nasal deformity or rhinorrhea.      Mouth/Throat:      Mouth: Mucous membranes are moist.      Pharynx: Oropharynx is clear.   Eyes:      General: Lids are normal.      Extraocular Movements: Extraocular movements intact.      Pupils: Pupils are equal, round, and reactive to light.   Cardiovascular:      Rate and Rhythm: Normal rate and regular rhythm.      Pulses: Normal pulses.      Heart sounds: Murmur heard.   Pulmonary:      Effort: Pulmonary effort is normal. No respiratory distress.      Breath sounds: Normal breath sounds. No stridor. No wheezing, rhonchi or rales.   Abdominal:      General: Bowel sounds are normal.      Palpations:  Abdomen is soft.      Tenderness: There is no abdominal tenderness. There is no guarding or rebound.   Musculoskeletal:      Cervical back: Normal range of motion and neck supple.      Right lower leg: No edema.      Left lower leg: No edema.   Skin:     General: Skin is warm and dry.      Capillary Refill: Capillary refill takes less than 2 seconds.   Neurological:      General: No focal deficit present.      Mental Status: She is alert and oriented to person, place, and time.   Psychiatric:         Behavior: Behavior normal. Behavior is cooperative.         Labs Reviewed   COMPREHENSIVE METABOLIC PANEL - Abnormal       Result Value    Sodium 132 (*)     Potassium 4.0      Chloride 98      CO2 25      ANION GAP 9      BUN 18      Creatinine 0.89      Glucose 146 (*)     Calcium 9.3      AST 20      ALT 11      Alkaline Phosphatase 64      Total Protein 7.0      Albumin 4.1      Total Bilirubin 0.61      eGFR 60      Narrative:     National Kidney Disease Foundation guidelines for Chronic Kidney Disease (CKD):     Stage 1 with normal or high GFR (GFR > 90 mL/min/1.73 square meters)    Stage 2 Mild CKD (GFR = 60-89 mL/min/1.73 square meters)    Stage 3A Moderate CKD (GFR = 45-59 mL/min/1.73 square meters)    Stage 3B Moderate CKD (GFR = 30-44 mL/min/1.73 square meters)    Stage 4 Severe CKD (GFR = 15-29 mL/min/1.73 square meters)    Stage 5 End Stage CKD (GFR <15 mL/min/1.73 square meters)  Note: GFR calculation is accurate only with a steady state creatinine   B-TYPE NATRIURETIC PEPTIDE (BNP) - Abnormal     (*)    HS TROPONIN I 0HR - Normal    hs TnI 0hr 5     HS TROPONIN I 2HR - Normal    hs TnI 2hr 6      Delta 2hr hsTnI 1     CBC AND DIFFERENTIAL    WBC 6.74      RBC 4.20      Hemoglobin 13.1      Hematocrit 38.7      MCV 92      MCH 31.2      MCHC 33.9      RDW 13.0      MPV 9.6      Platelets 221      nRBC 0      Segmented % 74      Immature Grans % 0      Lymphocytes % 16      Monocytes % 8       Eosinophils Relative 1      Basophils Relative 1      Absolute Neutrophils 4.95      Absolute Immature Grans 0.03      Absolute Lymphocytes 1.05      Absolute Monocytes 0.55      Eosinophils Absolute 0.09      Basophils Absolute 0.07       XR chest 1 view portable   ED Interpretation by Michele Miranda MD (09/10 4672)   No acute cardiac or pulmonary abnormality identified amendment evaluation of x-ray.      Final Interpretation by Kaleb Degroot MD (09/10 7087)      No active pulmonary disease.            Workstation performed: [id number]             ECG 12 Lead Documentation Only    Date/Time: 9/10/2024 12:20 PM    Performed by: Nora Darnell DO  Authorized by: Nora Darnell DO    Patient location:  ED  Previous ECG:     Previous ECG:  Unavailable  Interpretation:     Interpretation: non-specific    Quality:     Tracing quality:  Limited by artifact  Rate:     ECG rate:  64    ECG rate assessment: normal    Rhythm:     Rhythm: sinus rhythm    Ectopy:     Ectopy: none    QRS:     QRS axis:  Normal    QRS intervals:  Normal  Conduction:     Conduction: normal    ST segments:     ST segments:  Non-specific  T waves:     T waves: non-specific             Nora Darnell DO  09/13/24 2009

## 2024-09-10 NOTE — ED PROCEDURE NOTE
Procedure  POC Cardiac US    Date/Time: 9/10/2024 1:03 PM    Performed by: Martha Saldaña MD  Authorized by: Martha Saldaña MD    Patient location:  ED  Other Assisting Provider: Yes (comment) (Armond Andujar)    Procedure details:     Exam Type:  Educational    Indications: chest pain      Assessment / Evaluation for: cardiac function, pericardial effusion and right heart strain (suspected pulmonary embolism)      Exam Type: initial exam      Image quality: diagnostic      Image availability:  Images available in PACS and video obtained  Patient Details:     Cardiac Rhythm:  Regular    Mechanical ventilation: No    Cardiac findings:     Views obtained: parasternal long axis, parasternal short axis, subcostal and apical      Pericardial effusion: absent      Tamponade physiology: absent      Wall motion: normal      LV systolic function: normal      RV dilation: none    Pulmonary findings:     Left Lung Findings: left lung sliding      Right lung findings: right lung sliding      B-lines: no B-lines present    POC Lung US    Date/Time: 9/10/2024 1:05 PM    Performed by: Martha Saldaña MD  Authorized by: Martha Saldaña MD    Patient location:  ED  Other Assisting Provider: Yes (comment) (Armond Andujar)    Procedure details:     Exam Type:  Diagnostic    Indications: chest pain      Assessment / Evaluation for:  Pleural effusion, pneumonia and pneumothorax    Structures Visualized: pleural line and rib      Exam Type: initial exam      Image quality: diagnostic      Image availability:  Images available in PACS and video obtained  Left Hemithorax Findings:     Left pleura visualized:  Visualized    Left Hemithorax Findings: normal      Left lung findings: normal interstitium    Right Lung Findings:     Right pleural visualized:  Visualized    Right hemithorax findings: normal      Right lung findings: normal interstitium    Interpretation:     Findings: normal thoracic ultrasound                      Martha Saldaña MD  09/10/24 1479

## 2024-09-11 LAB
ATRIAL RATE: 64 BPM
P AXIS: 52 DEGREES
PR INTERVAL: 166 MS
QRS AXIS: -22 DEGREES
QRSD INTERVAL: 78 MS
QT INTERVAL: 416 MS
QTC INTERVAL: 429 MS
T WAVE AXIS: 45 DEGREES
VENTRICULAR RATE: 64 BPM

## 2024-09-11 PROCEDURE — 93010 ELECTROCARDIOGRAM REPORT: CPT | Performed by: INTERNAL MEDICINE

## 2024-09-13 ENCOUNTER — OFFICE VISIT (OUTPATIENT)
Dept: URGENT CARE | Facility: CLINIC | Age: 79
End: 2024-09-13
Payer: MEDICARE

## 2024-09-13 VITALS
DIASTOLIC BLOOD PRESSURE: 95 MMHG | HEIGHT: 61 IN | SYSTOLIC BLOOD PRESSURE: 217 MMHG | HEART RATE: 65 BPM | OXYGEN SATURATION: 97 % | WEIGHT: 133 LBS | TEMPERATURE: 97.7 F | RESPIRATION RATE: 16 BRPM | BODY MASS INDEX: 25.11 KG/M2

## 2024-09-13 DIAGNOSIS — N30.01 ACUTE CYSTITIS WITH HEMATURIA: Primary | ICD-10-CM

## 2024-09-13 LAB
SL AMB  POCT GLUCOSE, UA: ABNORMAL
SL AMB LEUKOCYTE ESTERASE,UA: ABNORMAL
SL AMB POCT BILIRUBIN,UA: ABNORMAL
SL AMB POCT BLOOD,UA: ABNORMAL
SL AMB POCT CLARITY,UA: ABNORMAL
SL AMB POCT COLOR,UA: ABNORMAL
SL AMB POCT KETONES,UA: ABNORMAL
SL AMB POCT NITRITE,UA: ABNORMAL
SL AMB POCT PH,UA: 6
SL AMB POCT SPECIFIC GRAVITY,UA: 1.02
SL AMB POCT URINE PROTEIN: 30
SL AMB POCT UROBILINOGEN: 0.2

## 2024-09-13 PROCEDURE — G0463 HOSPITAL OUTPT CLINIC VISIT: HCPCS | Performed by: NURSE PRACTITIONER

## 2024-09-13 PROCEDURE — 99213 OFFICE O/P EST LOW 20 MIN: CPT | Performed by: NURSE PRACTITIONER

## 2024-09-13 PROCEDURE — 81002 URINALYSIS NONAUTO W/O SCOPE: CPT | Performed by: NURSE PRACTITIONER

## 2024-09-13 PROCEDURE — 87086 URINE CULTURE/COLONY COUNT: CPT | Performed by: NURSE PRACTITIONER

## 2024-09-13 PROCEDURE — 87077 CULTURE AEROBIC IDENTIFY: CPT | Performed by: NURSE PRACTITIONER

## 2024-09-13 PROCEDURE — 87186 SC STD MICRODIL/AGAR DIL: CPT | Performed by: NURSE PRACTITIONER

## 2024-09-13 RX ORDER — DIAZEPAM 5 MG
5 TABLET ORAL EVERY 6 HOURS PRN
COMMUNITY

## 2024-09-13 NOTE — PROGRESS NOTES
[facility]        NAME: Chloé Anderson is a 79 y.o. female  : 1945    MRN: [id number]  DATE: 2024  TIME: 3:55 PM    Assessment and Plan   Acute cystitis with hematuria [N30.01]  1. Acute cystitis with hematuria  POCT urine dip    Urine culture        Urine dip in the office is positive for blood and leukocytes.Will send for culture. Paper script for Cipro 500mg BID x 5 days as computers were down. Blood pressure rechecked in the office and was 168/86. She had her blood pressure from home and was 145 systolic. Advised to follow up with her PCP regarding her BP while she is waiting to see cardiology on 10/15.   Advised to continue to monitor at home and if consistently above 180 she is to go to the ER for further evaluation.     Patient Instructions       Follow up with PCP in 3-5 days.  Proceed to  ER if symptoms worsen.    Chief Complaint     Chief Complaint   Patient presents with    Possible UTI     Yesterday pt started having frequency, urgency, and burning with urination.          History of Present Illness       Patient is an 79 year old female presenting with urgency, frequency, and dysuria that started yesterday. Denies fever or chills. No OTC medications attempted.         Review of Systems   Review of Systems   Constitutional:  Negative for activity change, chills and fever.   Respiratory:  Negative for cough.    Gastrointestinal:  Negative for abdominal pain.   Genitourinary:  Positive for dysuria, frequency and urgency.         Current Medications       Current Outpatient Medications:     diazepam (VALIUM) 5 mg tablet, Take 5 mg by mouth every 6 (six) hours as needed for anxiety, Disp: , Rfl:     lisinopril (ZESTRIL) 10 mg tablet, Take 10 mg by mouth daily Rx per Cardio, Disp: , Rfl:     mometasone (ELOCON) 0.1 % cream, Apply topically 2 (two) times a day as needed (Ear Eczema), Disp: 15 g, Rfl: 0    nebivolol (BYSTOLIC) 10 mg tablet, Take 10 mg by mouth daily Rx per Cardio,  Disp: , Rfl:     pantoprazole (PROTONIX) 40 mg tablet, Take 40 mg by mouth daily Rx per GI, Disp: , Rfl:     triamcinolone (KENALOG) 0.1 % cream, Apply 1 Application topically 2 (two) times a day as needed for rash, Disp: , Rfl:     clotrimazole (LOTRIMIN) 1 % cream, Apply topically if needed (Patient not taking: Reported on 2024), Disp: , Rfl:     Current Allergies     Allergies as of 2024 - Reviewed 2024   Allergen Reaction Noted    Amlodipine Shortness Of Breath 2024    Telmisartan Shortness Of Breath 2024    Bactrim [sulfamethoxazole-trimethoprim] GI Intolerance 2024    Hydralazine Hives 2024    Indapamide Other (See Comments) 2024    Losartan GI Intolerance 2024    Oxycodone GI Intolerance and Vomiting 2024            The following portions of the patient's history were reviewed and updated as appropriate: allergies, current medications, past family history, past medical history, past social history, past surgical history and problem list.     Past Medical History:   Diagnosis Date    Anxiety     Aortic regurgitation 2024    Baker's cyst     Eczema 2024    GERD (gastroesophageal reflux disease)     Grade II diastolic dysfunction 2024    Hyperlipidemia     Hypertension     Left atrial enlargement     Mitral regurgitation 2024    Mitral valve sclerosis     Osteoarthritis     Osteoporosis     Overweight 2024    Pulmonary hypertension (HCC)     Tricuspid regurgitation 2024       Past Surgical History:   Procedure Laterality Date    CATARACT EXTRACTION Bilateral      SECTION  1972    EGD  2022    LAPAROSCOPIC TOTAL HYSTERECTOMY  2016    Uterine Prolapse    STRABISMUS SURGERY Bilateral 1955       Family History   Problem Relation Age of Onset    Heart failure Mother         passed at 90    Cataracts Mother     Anxiety disorder Mother     Alcohol abuse Father     Stroke Father     Heart attack  "Father         passed at 69    Heart disease Father     Cancer Sister         passed at 78    Anxiety disorder Sister     Obesity Brother         passed at 69         Medications have been verified.        Objective   BP (!) 217/95 (BP Location: Right arm, Patient Position: Sitting, Cuff Size: Standard)   Pulse 65   Temp 97.7 °F (36.5 °C) (Temporal)   Resp 16   Ht 5' 1\" (1.549 m)   Wt 60.3 kg (133 lb)   SpO2 97%   BMI 25.13 kg/m²        Physical Exam     Physical Exam  Vitals reviewed.   Constitutional:       General: She is awake. She is not in acute distress.     Appearance: Normal appearance. She is normal weight.   Cardiovascular:      Rate and Rhythm: Normal rate and regular rhythm.      Heart sounds: Normal heart sounds, S1 normal and S2 normal.   Pulmonary:      Effort: Pulmonary effort is normal.      Breath sounds: Normal breath sounds. No decreased breath sounds, wheezing or rhonchi.   Abdominal:      Tenderness: There is no abdominal tenderness. There is no right CVA tenderness or left CVA tenderness.   Skin:     General: Skin is warm and moist.   Neurological:      General: No focal deficit present.      Mental Status: She is alert and oriented to person, place, and time.   Psychiatric:         Behavior: Behavior is cooperative.                   "

## 2024-09-15 LAB — BACTERIA UR CULT: ABNORMAL

## 2024-09-16 LAB — BACTERIA UR CULT: ABNORMAL

## 2024-09-17 ENCOUNTER — TELEPHONE (OUTPATIENT)
Dept: URGENT CARE | Facility: CLINIC | Age: 79
End: 2024-09-17

## 2024-09-17 DIAGNOSIS — R31.9 URINARY TRACT INFECTION WITH HEMATURIA, SITE UNSPECIFIED: Primary | ICD-10-CM

## 2024-09-17 DIAGNOSIS — N39.0 URINARY TRACT INFECTION WITH HEMATURIA, SITE UNSPECIFIED: Primary | ICD-10-CM

## 2024-09-17 RX ORDER — NITROFURANTOIN 25; 75 MG/1; MG/1
100 CAPSULE ORAL 2 TIMES DAILY
Qty: 10 CAPSULE | Refills: 0 | Status: SHIPPED | OUTPATIENT
Start: 2024-09-17 | End: 2024-09-22

## 2024-09-24 ENCOUNTER — OFFICE VISIT (OUTPATIENT)
Dept: FAMILY MEDICINE CLINIC | Facility: CLINIC | Age: 79
End: 2024-09-24
Payer: MEDICARE

## 2024-09-24 VITALS
TEMPERATURE: 98.2 F | BODY MASS INDEX: 24.96 KG/M2 | OXYGEN SATURATION: 99 % | WEIGHT: 132.2 LBS | DIASTOLIC BLOOD PRESSURE: 84 MMHG | HEIGHT: 61 IN | HEART RATE: 70 BPM | SYSTOLIC BLOOD PRESSURE: 180 MMHG | RESPIRATION RATE: 16 BRPM

## 2024-09-24 DIAGNOSIS — I10 PRIMARY HYPERTENSION: Primary | ICD-10-CM

## 2024-09-24 DIAGNOSIS — R07.89 CHEST PRESSURE: ICD-10-CM

## 2024-09-24 DIAGNOSIS — E66.3 OVERWEIGHT: ICD-10-CM

## 2024-09-24 DIAGNOSIS — R09.81 NASAL CONGESTION: ICD-10-CM

## 2024-09-24 PROCEDURE — G2211 COMPLEX E/M VISIT ADD ON: HCPCS | Performed by: FAMILY MEDICINE

## 2024-09-24 PROCEDURE — 99214 OFFICE O/P EST MOD 30 MIN: CPT | Performed by: FAMILY MEDICINE

## 2024-09-24 RX ORDER — NEBIVOLOL 20 MG/1
20 TABLET ORAL DAILY
Qty: 30 TABLET | Refills: 1 | Status: SHIPPED | OUTPATIENT
Start: 2024-09-24

## 2024-09-24 RX ORDER — CHOLECALCIFEROL (VITAMIN D3) 50 MCG
1 TABLET ORAL DAILY
COMMUNITY

## 2024-09-24 RX ORDER — CALCIUM CARBONATE 500(1250)
500 TABLET ORAL DAILY
COMMUNITY

## 2024-09-24 RX ORDER — SODIUM FLUORIDE 6.1 MG/ML
1 PASTE, DENTIFRICE DENTAL
COMMUNITY
Start: 2024-08-24

## 2024-09-24 RX ORDER — DIAZEPAM 5 MG
1 TABLET ORAL 2 TIMES DAILY PRN
COMMUNITY
End: 2024-09-24

## 2024-09-24 RX ORDER — LISINOPRIL 20 MG/1
20 TABLET ORAL DAILY
Qty: 30 TABLET | Refills: 1 | Status: SHIPPED | OUTPATIENT
Start: 2024-09-24

## 2024-09-24 NOTE — PROGRESS NOTES
Assessment/Plan:  Problem List Items Addressed This Visit          Cardiovascular and Mediastinum    Hypertension - Primary     Uncontrolled.  Increase Lisinopril 20mg QD and increase Bystolic 20mg QD.  Check blood pressure outside of office.  Recommend lifestyle modifications.           Relevant Medications    lisinopril (ZESTRIL) 20 mg tablet    nebivolol (BYSTOLIC) 20 MG tablet       Other    Overweight     Stable.  Recommend lifestyle modifications.            Other Visit Diagnoses       Chest pressure        Pending Cardio consult.  Increase Lisinopril 20mg QD and increase Bystolic 20mg QD.  Check blood pressure outside of office.  Recommend lifestyle modifications.        Nasal congestion        Advise Ernie med sinus rinse kit, Mucinex, Claritin/Zyrtec/Allegra/Xyzal, Flonase / Nasacort nasal spray.  Avoid decongestants if you have high blood pressure.               Return if symptoms worsen or fail to improve.      Future Appointments   Date Time Provider Department Center   10/2/2024 11:20 AM Carline Howard DO  [facility]   10/17/2024  8:40 AM Vince Merritt MD [facility]   11/21/2024 11:40 AM Michelle Buchanan MD [facility]        Subjective:     Chloé is a 79 y.o. female who presents today for a follow-up on her chronic medical conditions.        HPI:  Chief Complaint   Patient presents with    Follow-up     F/u from ER for chest pain, then seen at urgent care for UTI. Sees cardio mid October.      -- Above per clinical staff and reviewed. --      HPI      Today:      F/U [facility] ER [place] 9/10/24     Chest Pain - Ongoing episodes of band-like chest pain c associated SOB, lasting minutes to hours, with elevated BP.  Symptoms improve c taking Bystolic.  S/p stable Troponin x 2 and BNP.  Normal Chest Xray.  Pending Cardio consult c Dr. Merritt 10/24 - patient has not been able to get an earlier appointment.      S/p Echo 9/6/24 -       Left Ventricle: Left ventricular cavity  size is normal. Wall thickness is normal. The left ventricular ejection fraction is 55%. Systolic function is normal. Wall motion is normal. Diastolic function is moderately abnormal, consistent with grade II (pseudonormal) relaxation.    IVS: There is sigmoid appearance of the septum.    Left Atrium: The atrium is severely dilated (>48 mL/m2).    Aortic Valve: There is mild regurgitation.    Mitral Valve: There is mild thickening limited to the leaflet margin. The annulus is mildly dilated. There is moderate regurgitation with a centrally directed jet.    Tricuspid Valve: There is mild regurgitation. The right ventricular systolic pressure is mildly to moderately elevated. The estimated right ventricular systolic pressure is 45.00 mmHg.    2.  HTN -       Today, she still has chest pressure, lasting for different duration in time, patient cannot quantify.  BP check on arm cuff outside of office 128-183/73-88.      She has been taking Lisinopril 10mg BID 3-4 days, which alleviates the chest pressure.  She is unsure what her BP is when she takes higher dose of Rx.      The following portions of the patient's history were reviewed and updated as appropriate: allergies, current medications, past family history, past medical history, past social history, past surgical history and problem list.      Review of Systems   Constitutional:  Negative for appetite change, chills, diaphoresis, fatigue and fever.   Respiratory:  Positive for chest tightness. Negative for shortness of breath.    Cardiovascular:  Negative for chest pain.   Gastrointestinal:  Negative for abdominal pain, blood in stool, diarrhea, nausea and vomiting.   Genitourinary:  Negative for dysuria.        Current Outpatient Medications   Medication Sig Dispense Refill    Calcium 500 MG tablet Take 500 mg by mouth in the morning      Cholecalciferol (Vitamin D3) 50 MCG (2000 UT) TABS Take 1 tablet by mouth daily      lisinopril (ZESTRIL) 20 mg tablet Take 1  "tablet (20 mg total) by mouth daily 30 tablet 1    mometasone (ELOCON) 0.1 % cream Apply topically 2 (two) times a day as needed (Ear Eczema) 15 g 0    nebivolol (BYSTOLIC) 20 MG tablet Take 1 tablet (20 mg total) by mouth daily 30 tablet 1    pantoprazole (PROTONIX) 40 mg tablet Take 40 mg by mouth daily Rx per GI      Sodium Fluoride 5000 PPM 1.1 % GEL Take 1 Application by mouth daily at bedtime      triamcinolone (KENALOG) 0.1 % cream Apply 1 Application topically 2 (two) times a day as needed for rash       No current facility-administered medications for this visit.       Objective:  BP (!) 180/84   Pulse 70   Temp 98.2 °F (36.8 °C)   Resp 16   Ht 5' 1\" (1.549 m)   Wt 60 kg (132 lb 3.2 oz)   SpO2 99%   BMI 24.98 kg/m²    Wt Readings from Last 3 Encounters:   09/24/24 60 kg (132 lb 3.2 oz)   09/13/24 60.3 kg (133 lb)   09/06/24 60.3 kg (133 lb)      BP Readings from Last 3 Encounters:   09/24/24 (!) 180/84   09/13/24 (!) 217/95   09/10/24 (!) 190/86          Physical Exam  Vitals and nursing note reviewed.   Constitutional:       Appearance: Normal appearance. She is well-developed. She is obese.   HENT:      Head: Normocephalic and atraumatic.      Nose: Congestion present. No rhinorrhea.   Eyes:      Conjunctiva/sclera: Conjunctivae normal.   Neck:      Thyroid: No thyromegaly.      Vascular: No carotid bruit.   Cardiovascular:      Rate and Rhythm: Normal rate and regular rhythm.      Pulses: Normal pulses.      Heart sounds: Normal heart sounds.   Pulmonary:      Effort: Pulmonary effort is normal.      Breath sounds: Normal breath sounds.   Musculoskeletal:         General: No swelling or tenderness.      Cervical back: Neck supple.      Right lower leg: No edema.      Left lower leg: No edema.   Lymphadenopathy:      Cervical: No cervical adenopathy.   Neurological:      General: No focal deficit present.      Mental Status: She is alert and oriented to person, place, and time. Mental status is at " "baseline.   Psychiatric:         Mood and Affect: Mood normal.         Lab Results:      Lab Results   Component Value Date    WBC 6.74 09/10/2024    HGB 13.1 09/10/2024    HCT 38.7 09/10/2024     09/10/2024    ALT 11 09/10/2024    AST 20 09/10/2024    K 4.0 09/10/2024    CL 98 09/10/2024    CREATININE 0.89 09/10/2024    BUN 18 09/10/2024    CO2 25 09/10/2024     No results found for: \"URICACID\"  Invalid input(s): \"BASENAME\" Vitamin D    XR chest 1 view portable    Result Date: 9/10/2024  Narrative: XR CHEST PORTABLE INDICATION: CP, SOB. COMPARISON: None FINDINGS: Clear lungs. No pneumothorax or pleural effusion. Normal cardiomediastinal silhouette. Bones are unremarkable for age.     Impression: No active pulmonary disease. Workstation performed: [id number]     US bedside procedure    Result Date: 9/10/2024  Narrative: [id number]    Echo complete w/ contrast if indicated    Result Date: 9/6/2024  Narrative:   Left Ventricle: Left ventricular cavity size is normal. Wall thickness is normal. The left ventricular ejection fraction is 55%. Systolic function is normal. Wall motion is normal. Diastolic function is moderately abnormal, consistent with grade II (pseudonormal) relaxation.   IVS: There is sigmoid appearance of the septum.   Left Atrium: The atrium is severely dilated (>48 mL/m2).   Aortic Valve: There is mild regurgitation.   Mitral Valve: There is mild thickening limited to the leaflet margin. The annulus is mildly dilated. There is moderate regurgitation with a centrally directed jet.   Tricuspid Valve: There is mild regurgitation. The right ventricular systolic pressure is mildly to moderately elevated. The estimated right ventricular systolic pressure is 45.00 mmHg.        POCT Labs                       "

## 2024-09-24 NOTE — PATIENT INSTRUCTIONS
Advise Ernie med sinus rinse kit, Mucinex, Claritin/Zyrtec/Allegra/Xyzal, Flonase / Nasacort nasal spray.  Avoid decongestants if you have high blood pressure.

## 2024-09-27 ENCOUNTER — LAB (OUTPATIENT)
Dept: LAB | Facility: CLINIC | Age: 79
End: 2024-09-27
Payer: MEDICARE

## 2024-09-27 DIAGNOSIS — R73.01 IFG (IMPAIRED FASTING GLUCOSE): ICD-10-CM

## 2024-09-27 DIAGNOSIS — E78.49 OTHER HYPERLIPIDEMIA: ICD-10-CM

## 2024-09-27 DIAGNOSIS — I10 PRIMARY HYPERTENSION: ICD-10-CM

## 2024-09-27 DIAGNOSIS — M81.0 OSTEOPOROSIS, UNSPECIFIED OSTEOPOROSIS TYPE, UNSPECIFIED PATHOLOGICAL FRACTURE PRESENCE: ICD-10-CM

## 2024-09-27 DIAGNOSIS — K21.9 GASTROESOPHAGEAL REFLUX DISEASE, UNSPECIFIED WHETHER ESOPHAGITIS PRESENT: ICD-10-CM

## 2024-09-27 LAB
25(OH)D3 SERPL-MCNC: 75.9 NG/ML (ref 30–100)
ALBUMIN SERPL BCG-MCNC: 3.9 G/DL (ref 3.5–5)
ALP SERPL-CCNC: 60 U/L (ref 34–104)
ALT SERPL W P-5'-P-CCNC: 12 U/L (ref 7–52)
ANION GAP SERPL CALCULATED.3IONS-SCNC: 4 MMOL/L (ref 4–13)
AST SERPL W P-5'-P-CCNC: 17 U/L (ref 13–39)
BASOPHILS # BLD AUTO: 0.08 THOUSANDS/ΜL (ref 0–0.1)
BASOPHILS NFR BLD AUTO: 1 % (ref 0–1)
BILIRUB SERPL-MCNC: 0.73 MG/DL (ref 0.2–1)
BUN SERPL-MCNC: 18 MG/DL (ref 5–25)
CALCIUM SERPL-MCNC: 9.1 MG/DL (ref 8.4–10.2)
CHLORIDE SERPL-SCNC: 99 MMOL/L (ref 96–108)
CHOLEST SERPL-MCNC: 155 MG/DL
CO2 SERPL-SCNC: 29 MMOL/L (ref 21–32)
CREAT SERPL-MCNC: 0.87 MG/DL (ref 0.6–1.3)
EOSINOPHIL # BLD AUTO: 0.39 THOUSAND/ΜL (ref 0–0.61)
EOSINOPHIL NFR BLD AUTO: 6 % (ref 0–6)
ERYTHROCYTE [DISTWIDTH] IN BLOOD BY AUTOMATED COUNT: 12.8 % (ref 11.6–15.1)
EST. AVERAGE GLUCOSE BLD GHB EST-MCNC: 114 MG/DL
GFR SERPL CREATININE-BSD FRML MDRD: 62 ML/MIN/1.73SQ M
GLUCOSE P FAST SERPL-MCNC: 120 MG/DL (ref 65–99)
HBA1C MFR BLD: 5.6 %
HCT VFR BLD AUTO: 38.1 % (ref 34.8–46.1)
HDLC SERPL-MCNC: 46 MG/DL
HGB BLD-MCNC: 12.6 G/DL (ref 11.5–15.4)
IMM GRANULOCYTES # BLD AUTO: 0.03 THOUSAND/UL (ref 0–0.2)
IMM GRANULOCYTES NFR BLD AUTO: 1 % (ref 0–2)
LDLC SERPL CALC-MCNC: 93 MG/DL (ref 0–100)
LDLC SERPL DIRECT ASSAY-MCNC: 112 MG/DL (ref 0–100)
LYMPHOCYTES # BLD AUTO: 1.38 THOUSANDS/ΜL (ref 0.6–4.47)
LYMPHOCYTES NFR BLD AUTO: 22 % (ref 14–44)
MAGNESIUM SERPL-MCNC: 1.7 MG/DL (ref 1.9–2.7)
MCH RBC QN AUTO: 30.8 PG (ref 26.8–34.3)
MCHC RBC AUTO-ENTMCNC: 33.1 G/DL (ref 31.4–37.4)
MCV RBC AUTO: 93 FL (ref 82–98)
MONOCYTES # BLD AUTO: 0.72 THOUSAND/ΜL (ref 0.17–1.22)
MONOCYTES NFR BLD AUTO: 12 % (ref 4–12)
NEUTROPHILS # BLD AUTO: 3.67 THOUSANDS/ΜL (ref 1.85–7.62)
NEUTS SEG NFR BLD AUTO: 58 % (ref 43–75)
NONHDLC SERPL-MCNC: 109 MG/DL
NRBC BLD AUTO-RTO: 0 /100 WBCS
PLATELET # BLD AUTO: 270 THOUSANDS/UL (ref 149–390)
PMV BLD AUTO: 9.9 FL (ref 8.9–12.7)
POTASSIUM SERPL-SCNC: 4.7 MMOL/L (ref 3.5–5.3)
PROT SERPL-MCNC: 6.8 G/DL (ref 6.4–8.4)
RBC # BLD AUTO: 4.09 MILLION/UL (ref 3.81–5.12)
SODIUM SERPL-SCNC: 132 MMOL/L (ref 135–147)
TRIGL SERPL-MCNC: 81 MG/DL
TSH SERPL DL<=0.05 MIU/L-ACNC: 1.84 UIU/ML (ref 0.45–4.5)
WBC # BLD AUTO: 6.27 THOUSAND/UL (ref 4.31–10.16)

## 2024-09-27 PROCEDURE — 80061 LIPID PANEL: CPT

## 2024-09-27 PROCEDURE — 85025 COMPLETE CBC W/AUTO DIFF WBC: CPT

## 2024-09-27 PROCEDURE — 80053 COMPREHEN METABOLIC PANEL: CPT

## 2024-09-27 PROCEDURE — 84443 ASSAY THYROID STIM HORMONE: CPT

## 2024-09-27 PROCEDURE — 83721 ASSAY OF BLOOD LIPOPROTEIN: CPT

## 2024-09-27 PROCEDURE — 83036 HEMOGLOBIN GLYCOSYLATED A1C: CPT

## 2024-09-27 PROCEDURE — 83735 ASSAY OF MAGNESIUM: CPT

## 2024-09-27 PROCEDURE — 82306 VITAMIN D 25 HYDROXY: CPT

## 2024-09-27 PROCEDURE — 36415 COLL VENOUS BLD VENIPUNCTURE: CPT

## 2024-09-27 NOTE — ASSESSMENT & PLAN NOTE
Uncontrolled.  Increase Lisinopril 20mg QD and increase Bystolic 20mg QD.  Check blood pressure outside of office.  Recommend lifestyle modifications.

## 2024-09-27 NOTE — RESULT ENCOUNTER NOTE
Unstable labs - will review with patient at upcoming appointment.    Hyponatremia - Unchanged.  Assess fluid intake.      IFG - Pending HgA1C.     Low Magnesium - Start over the counter Magnesium glycinate 400mg 1 pill by mouth daily.  Check CMP and Magnesium in 1 week.

## 2024-10-02 ENCOUNTER — OFFICE VISIT (OUTPATIENT)
Dept: FAMILY MEDICINE CLINIC | Facility: CLINIC | Age: 79
End: 2024-10-02
Payer: MEDICARE

## 2024-10-02 VITALS
WEIGHT: 132 LBS | BODY MASS INDEX: 24.92 KG/M2 | HEART RATE: 60 BPM | OXYGEN SATURATION: 99 % | TEMPERATURE: 98.7 F | SYSTOLIC BLOOD PRESSURE: 168 MMHG | DIASTOLIC BLOOD PRESSURE: 78 MMHG | RESPIRATION RATE: 16 BRPM | HEIGHT: 61 IN

## 2024-10-02 DIAGNOSIS — Z00.00 MEDICARE ANNUAL WELLNESS VISIT, SUBSEQUENT: Primary | ICD-10-CM

## 2024-10-02 DIAGNOSIS — H34.8322 STABLE BRANCH RETINAL VEIN OCCLUSION OF LEFT EYE: ICD-10-CM

## 2024-10-02 DIAGNOSIS — E66.3 OVERWEIGHT: ICD-10-CM

## 2024-10-02 DIAGNOSIS — E83.42 HYPOMAGNESEMIA: ICD-10-CM

## 2024-10-02 DIAGNOSIS — I10 PRIMARY HYPERTENSION: ICD-10-CM

## 2024-10-02 DIAGNOSIS — H43.391 VITREOUS SYNERESIS OF RIGHT EYE: ICD-10-CM

## 2024-10-02 DIAGNOSIS — K21.9 GASTROESOPHAGEAL REFLUX DISEASE, UNSPECIFIED WHETHER ESOPHAGITIS PRESENT: ICD-10-CM

## 2024-10-02 DIAGNOSIS — E87.1 HYPONATREMIA: ICD-10-CM

## 2024-10-02 DIAGNOSIS — H35.033 HYPERTENSIVE RETINOPATHY OF BOTH EYES: ICD-10-CM

## 2024-10-02 DIAGNOSIS — H43.9 VITREOUS DEBRIS: ICD-10-CM

## 2024-10-02 DIAGNOSIS — E78.49 OTHER HYPERLIPIDEMIA: ICD-10-CM

## 2024-10-02 DIAGNOSIS — H43.812 POSTERIOR VITREOUS DETACHMENT OF LEFT EYE: ICD-10-CM

## 2024-10-02 DIAGNOSIS — M81.0 OSTEOPOROSIS, UNSPECIFIED OSTEOPOROSIS TYPE, UNSPECIFIED PATHOLOGICAL FRACTURE PRESENCE: ICD-10-CM

## 2024-10-02 PROBLEM — H35.9 WHITE WITHOUT PRESSURE OF PERIPHERAL RETINA OF BOTH EYES: Status: ACTIVE | Noted: 2024-10-02

## 2024-10-02 PROCEDURE — G0442 ANNUAL ALCOHOL SCREEN 15 MIN: HCPCS | Performed by: FAMILY MEDICINE

## 2024-10-02 PROCEDURE — G0439 PPPS, SUBSEQ VISIT: HCPCS | Performed by: FAMILY MEDICINE

## 2024-10-02 PROCEDURE — 99214 OFFICE O/P EST MOD 30 MIN: CPT | Performed by: FAMILY MEDICINE

## 2024-10-02 RX ORDER — FAMOTIDINE 20 MG/1
20 TABLET, FILM COATED ORAL 2 TIMES DAILY PRN
Qty: 60 TABLET | Refills: 1 | Status: SHIPPED | OUTPATIENT
Start: 2024-10-02

## 2024-10-02 RX ORDER — MAGNESIUM GLYCINATE 100 MG
400 TABLET ORAL DAILY
COMMUNITY

## 2024-10-02 NOTE — PROGRESS NOTES
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    Orders:    Ambulatory Referral to Ophthalmology; Future    Hypertensive retinopathy of both eyes    Orders:    Ambulatory Referral to Ophthalmology; Future    Posterior vitreous detachment of left eye    Orders:    Ambulatory Referral to Ophthalmology; Future    Vitreous debris    Orders:    Ambulatory Referral to Ophthalmology; Future    Vitreous syneresis of right eye    Orders:    Ambulatory Referral to Ophthalmology; Future    Medicare annual wellness visit, subsequent         Primary hypertension    Orders:    Comprehensive metabolic panel; Future    Magnesium; Future      Uncontrolled.  S/p recent Increase Lisinopril 20mg QD and increase Bystolic 20mg QD.  Continue to monitor on current regimen.  Pending Cardio consult.  Check blood pressure outside of office.  Recommend lifestyle modifications.    Other hyperlipidemia    Orders:    TSH, 3rd generation with Free T4 reflex; Future    LDL cholesterol, direct; Future    Stable.  Not on statin currently.  Recommend lifestyle modifications.    Overweight    Orders:    TSH, 3rd generation with Free T4 reflex; Future    Stable.  Recommend lifestyle modifications.    Osteoporosis, unspecified osteoporosis type, unspecified pathological fracture presence    Orders:    TSH, 3rd generation with Free T4 reflex; Future    Pending Endo consult to discuss treatment options.   Hypomagnesemia    Orders:    Comprehensive metabolic panel; Future    Magnesium; Future    Comprehensive metabolic panel; Future    Magnesium; Future      Low Magnesium - Start over the counter Magnesium glycinate 400mg 1 pill by mouth daily.  Check CMP and Magnesium in 1 week.      Hyponatremia    Orders:    Comprehensive metabolic panel; Future    S/p Renal consult at previous residence - records requested.  Continue fluid restriction and continue electrolyte powder.    Gastroesophageal reflux disease, unspecified  whether esophagitis present    Orders:    famotidine (PEPCID) 20 mg tablet; Take 1 tablet (20 mg total) by mouth 2 (two) times a day as needed for heartburn     Stable s Protonix 40mg QD.  Start Pepcid 20mg BID PRN.  Pending GI consult as overdue for EGD. Watch GERD diet.      Return in 4 months (on 2/2/2025) for 40min - 4mo - HTN, Anxiety, CKD, GERD, Labs.      Future Appointments   Date Time Provider Department Center   10/17/2024  8:40 AM Vince Merritt MD [facility]   11/21/2024 11:40 AM Michelle Buchanan MD ENDO [facility]   2/5/2025 11:00 AM Carline Howard DO [facility]        Subjective:     Chloé is a 79 y.o. female who presents today for a follow-up on her chronic medical conditions.        HPI:  Chief Complaint   Patient presents with    Medicare Wellness Visit     AWV/F/U HTN, Anxiety, CKD, GERD, Labs. Review labs, going over [facility] docs. Sees endo next month. Needs ref for retina specialist.      -- Above per clinical staff and reviewed. --      HPI      Today:    Return in about 6 weeks (around 9/23/2024) for AWV?/F/U HTN, Anxiety, CKD, GERD, Labs     Overweight - Watching diet.  +Exercise - Home video for 20 minutes, 7 days per week.        HTN - Pending Cardio consult c Dr. Merritt 10/24 - patient has not been able to get an earlier appointment.   On increased Lisinopril 20mg QD - has a cough, and Bystolic 20mg QD x 8 days.  Now has recurrent chest tightness and SOB, relieved with taking BB at night.  BP check outside of office on arm cuff 145/78.  D/C Metoprolol ER 25mg QD due to bradycardia.      H/O Hyponatremia - Last saw Renal 5/24 prior to move - did not advise Renal F/U.  Had fluid restriction, but patient has not been monitoring.  She has been drinking electrolete powder daily.      Left Atrial Enlargement / Mitral Valve Sclerosis / Tricuspid Regurgitation / Pulmonary HTN - Last Echo 9/6/24, due to repeat 9/24.  Management per Cardio.       Hyperlipidemia - No statin  previously.       Anxiety - D/C Valium 5mg BID PRN per previous PCP - last dose?, taking?  Using for flying, pre-op anxiety.  Was previously Rx 15 pills per year by prior PCP.       PHQ-2/9 Depression Screening       Little interest or pleasure in doing things: 0 - not at all  Feeling down, depressed, or hopeless: 0 - not at all  Trouble falling or staying asleep, or sleeping too much: 0 - not at all  Feeling tired or having little energy: 0 - not at all  Poor appetite or overeatin - not at all  Feeling bad about yourself - or that you are a failure or have let yourself or your family down: 0 - not at all  Trouble concentrating on things, such as reading the newspaper or watching television: 0 - not at all  Moving or speaking so slowly that other people could have noticed. Or the opposite - being so fidgety or restless that you have been moving around a lot more than usual: 0 - not at all  Thoughts that you would be better off dead, or of hurting yourself in some way: 0 - not at all  PHQ-2 Score: 0  PHQ-2 Interpretation: Negative depression screen  PHQ-9 Score: 0  PHQ-9 Interpretation: No or Minimal depression                ESTEFANI-7 Flowsheet Screening    Flowsheet Row Most Recent Value   Over the last two weeks, how often have you been bothered by the following problems?      Feeling nervous, anxious, or on edge 0   Not being able to stop or control worrying 0   Worrying too much about different things 0   Trouble relaxing  0   Being so restless that it's hard to sit still 0   Becoming easily annoyed or irritable  0   Feeling afraid as if something awful might happen 0   How difficult have these problems made it for you to do your work, take care of things at home, or get along with other people?  Not difficult at all   ESTEFANI Score  0         PHQ-2/9 Depression Screening    Little interest or pleasure in doing things: 0 - not at all  Feeling down, depressed, or hopeless: 0 - not at all                 GERD / H/O  "Food Impaction - Referred to GI 24 - No appt scheduled yet.  Self-weaned Protonix 40mg QD s issue.  Last EGD - was supped to be done , but D/C due to uncontrolled BP.   Overdue for colonosocpy.  No other GERD meds previously?  Watching GERD diet.       Osteoporosis - Pending Endo consult c Dr. Buchanan .  Last Dexa 23.  Patient has not been on meds previously - Prolia was advised.  She states she was supposed to have repeat Dexa  for \"just cause.\"       Eczema - Referred to Derm 24 - No appt scheduled yet.  On Elocon BID PRN ear eczema.  Uses twice yearly.       Physical Deconditioning - Referred to PT 24 - No appt scheduled yet.      Retinal Issues - Needs Retinal consult.          From previous note:    Overweight - Watching diet.  +Exercise - Home video for 20 minutes, 7 days per week.        HTN - On Lisinopril 10mg QD - has a cough, Bystolic 10mg QD.  Now has recurrent chest tightness and SOB, relieved with taking BB at night.  No BP check outside of office.  D/C Metoprolol ER 25mg QD due to bradycardia.       Hyperlipidemia - No statin previously.       Anxiety - Previously on Valium 5mg BID PRN per previous PCP - last dose?, taking?  Using for flying, pre-op anxiety.  Was previously Rx 15 pills per year by prior PCP.       PHQ-2/9 Depression Screening       Little interest or pleasure in doing things: 0 - not at all  Feeling down, depressed, or hopeless: 0 - not at all  Trouble falling or staying asleep, or sleeping too much: 0 - not at all  Feeling tired or having little energy: 0 - not at all  Poor appetite or overeatin - not at all  Feeling bad about yourself - or that you are a failure or have let yourself or your family down: 0 - not at all  Trouble concentrating on things, such as reading the newspaper or watching television: 0 - not at all  Moving or speaking so slowly that other people could have noticed. Or the opposite - being so fidgety or restless that " "you have been moving around a lot more than usual: 0 - not at all  Thoughts that you would be better off dead, or of hurting yourself in some way: 0 - not at all  PHQ-2 Score: 0  PHQ-2 Interpretation: Negative depression screen  PHQ-9 Score: 0  PHQ-9 Interpretation: No or Minimal depression                ESTEFANI-7 Flowsheet Screening    Flowsheet Row Most Recent Value   Over the last two weeks, how often have you been bothered by the following problems?      Feeling nervous, anxious, or on edge 0   Not being able to stop or control worrying 0   Worrying too much about different things 0   Trouble relaxing  0   Being so restless that it's hard to sit still 0   Becoming easily annoyed or irritable  0   Feeling afraid as if something awful might happen 0   How difficult have these problems made it for you to do your work, take care of things at home, or get along with other people?  Not difficult at all   ESTEFANI Score  0          MDQ:  0, Asynchronous, No Problem     Last Echo 6/24, due to repeat 9/24.  Management per Cardio.       GERD / H/O Food Impaction - On Protonix 40mg QD.  Last EGD 2022- was supped to be done 8/23, but D/C due to uncontrolled BP.   No other GERD meds previously?  Watching GERD diet.       Osteoporosis - Last Dexa 2023.  Patient has not been on meds previously - Prolia was advised.  She states she was supposed to have repeat Dexa 9/24 for \"just cause.\"       Eczema - On Elocon BID PRN ear eczema.  Uses twice yearly.       Reviewed:  Labs 9/27/24, Echo 11/21/22, Stress Test 12/30/22     No Gyn.  S/p Lap Total Hysterectomy due to Uterine Prolapase.          The following portions of the patient's history were reviewed and updated as appropriate: allergies, current medications, past family history, past medical history, past social history, past surgical history and problem list.      Review of Systems   Constitutional:  Negative for appetite change, chills, diaphoresis, fatigue and fever.   Respiratory: " " Negative for chest tightness and shortness of breath.    Cardiovascular:  Negative for chest pain.   Gastrointestinal:  Negative for abdominal pain, blood in stool, diarrhea, nausea and vomiting.   Genitourinary:  Negative for dysuria.        Current Outpatient Medications   Medication Sig Dispense Refill    Calcium 500 MG tablet Take 500 mg by mouth in the morning      Cholecalciferol (Vitamin D3) 50 MCG (2000 UT) TABS Take 1 tablet by mouth daily      famotidine (PEPCID) 20 mg tablet Take 1 tablet (20 mg total) by mouth 2 (two) times a day as needed for heartburn 60 tablet 1    lisinopril (ZESTRIL) 20 mg tablet Take 1 tablet (20 mg total) by mouth daily 30 tablet 1    Magnesium Bisglycinate 100 MG TABS Take 400 mg by mouth in the morning      mometasone (ELOCON) 0.1 % cream Apply topically 2 (two) times a day as needed (Ear Eczema) 15 g 0    nebivolol (BYSTOLIC) 20 MG tablet Take 1 tablet (20 mg total) by mouth daily 30 tablet 1    Sodium Fluoride 5000 PPM 1.1 % GEL Take 1 Application by mouth daily at bedtime      triamcinolone (KENALOG) 0.1 % cream Apply 1 Application topically 2 (two) times a day as needed for rash       No current facility-administered medications for this visit.       Objective:  /78   Pulse 60   Temp 98.7 °F (37.1 °C)   Resp 16   Ht 5' 1\" (1.549 m)   Wt 59.9 kg (132 lb)   SpO2 99%   BMI 24.94 kg/m²    Wt Readings from Last 3 Encounters:   10/02/24 59.9 kg (132 lb)   09/24/24 60 kg (132 lb 3.2 oz)   09/13/24 60.3 kg (133 lb)      BP Readings from Last 3 Encounters:   10/02/24 168/78   09/24/24 (!) 180/84   09/13/24 (!) 217/95          Physical Exam  Vitals and nursing note reviewed.   Constitutional:       Appearance: Normal appearance. She is well-developed and normal weight.   HENT:      Head: Normocephalic and atraumatic.   Eyes:      Conjunctiva/sclera: Conjunctivae normal.   Neck:      Thyroid: No thyromegaly.   Cardiovascular:      Rate and Rhythm: Normal rate and regular " "rhythm.      Pulses: Normal pulses.      Heart sounds: Normal heart sounds.   Pulmonary:      Effort: Pulmonary effort is normal.      Breath sounds: Normal breath sounds.   Abdominal:      Tenderness: There is no abdominal tenderness.   Musculoskeletal:         General: No swelling or tenderness.      Cervical back: Neck supple.      Right lower leg: No edema.      Left lower leg: No edema.   Lymphadenopathy:      Cervical: No cervical adenopathy.   Neurological:      General: No focal deficit present.      Mental Status: She is alert and oriented to person, place, and time.   Psychiatric:         Mood and Affect: Mood normal.         Behavior: Behavior normal.         Thought Content: Thought content normal.         Judgment: Judgment normal.         Lab Results:      Lab Results   Component Value Date    WBC 6.27 09/27/2024    HGB 12.6 09/27/2024    HCT 38.1 09/27/2024     09/27/2024    TRIG 81 09/27/2024    HDL 46 (L) 09/27/2024    LDLDIRECT 112 (H) 09/27/2024    ALT 12 09/27/2024    AST 17 09/27/2024    K 4.7 09/27/2024    CL 99 09/27/2024    CREATININE 0.87 09/27/2024    BUN 18 09/27/2024    CO2 29 09/27/2024    GLUF 120 (H) 09/27/2024    HGBA1C 5.6 09/27/2024     No results found for: \"URICACID\"  Invalid input(s): \"BASENAME\" Vitamin D    XR chest 1 view portable    Result Date: 9/10/2024  Narrative: XR CHEST PORTABLE INDICATION: CP, SOB. COMPARISON: None FINDINGS: Clear lungs. No pneumothorax or pleural effusion. Normal cardiomediastinal silhouette. Bones are unremarkable for age.     Impression: No active pulmonary disease. Workstation performed: [id number]     US bedside procedure    Result Date: 9/10/2024  Narrative: [id number]    Echo complete w/ contrast if indicated    Result Date: 9/6/2024  Narrative:   Left Ventricle: Left ventricular cavity size is normal. Wall thickness is normal. The left ventricular ejection fraction is 55%. Systolic function is normal. Wall " motion is normal. Diastolic function is moderately abnormal, consistent with grade II (pseudonormal) relaxation.   IVS: There is sigmoid appearance of the septum.   Left Atrium: The atrium is severely dilated (>48 mL/m2).   Aortic Valve: There is mild regurgitation.   Mitral Valve: There is mild thickening limited to the leaflet margin. The annulus is mildly dilated. There is moderate regurgitation with a centrally directed jet.   Tricuspid Valve: There is mild regurgitation. The right ventricular systolic pressure is mildly to moderately elevated. The estimated right ventricular systolic pressure is 45.00 mmHg.        POCT Labs        Urinary Incontinence Plan of Care: counseling topics discussed: practice Kegel (pelvic floor strengthening) exercises.

## 2024-10-02 NOTE — PATIENT INSTRUCTIONS
"Low Magnesium - Start over the counter Magnesium glycinate 400mg 1 pill by mouth daily.  Check CMP and Magnesium in 1 week.        Patient Education     Pelvic floor muscle exercises   The Basics   Written by the doctors and editors at [facility]   What is the pelvic floor? -- The \"pelvic floor\" is the name for the muscles that support the organs in the pelvis. These organs include the bladder and rectum. In the female pelvis, they also include the uterus (figure 1).  What are pelvic floor muscle exercises? -- These are exercises that can make your pelvic floor muscles stronger. They involve learning ways to tighten and relax specific muscles.  Pelvic floor muscle exercises can help keep you from leaking urine, gas, or bowel movements. They can also help with a condition called \"pelvic organ prolapse.\" This is when the organs in the lower belly drop down and press against or bulge into the vagina.  One way to strengthen your pelvic floor muscles is to do exercises. These are also known as \"Kegel\" exercises.  How do I do pelvic floor muscle exercises? -- If you want to try pelvic floor muscle exercises, start by talking to your doctor or nurse. They can talk to you about whether these exercises can help you. They can also teach you how to do them correctly.  You will need to learn which muscles to tighten and relax. It is sometimes hard to figure out the right muscles.  Some ways you can practice:   People with female or male anatomy - Squeeze the muscles you would use to avoid passing gas.   People with female anatomy - Put a finger inside your vagina and squeeze the muscles around your finger. Or you can imagine that you are sitting on a marble and have to pick it up using your vagina.   People with male anatomy - Squeeze the muscles that control the flow of urine. These exercises might help reduce urine leaks in people who have had surgery to treat prostate cancer or an enlarged prostate.  No matter how you learn to " "do pelvic floor muscle exercises, know that the muscles involved are not in your belly, thighs, or buttocks.  After you learn which muscles to tighten and relax, you can do the exercises in any position (standing, sitting, or lying down).  Should I see a physical therapist? -- Your doctor or nurse might suggest working with a physical therapist who has special training in pelvic floor issues. They can check your muscle strength and teach you specific exercises.  How often should I do the exercises? -- A common approach is to try to do a set of the exercises 3 times a day.  For each set, do the following about 10 times:   Squeeze your pelvic muscles.   Hold the muscles tight for about 10 seconds.   Relax the muscles completely.  Keep up this routine for at least a few months. You will probably notice results, but it might take a few weeks to several months.  How do pelvic floor muscle exercises help? -- Pelvic floor muscle exercises can help:   Prevent urine leaks in people who have \"stress incontinence\" - This means that they leak urine when they cough, laugh, sneeze, or strain.   Control sudden urges to urinate - These happen to people with \"urinary urgency\" or \"urge incontinence.\"   Control the release of gas or bowel movements   Improve symptoms caused by pelvic organ prolapse - These can include pressure or bulging in the vagina. If you have these symptoms, see your doctor or nurse to find out the cause.  It might take a few months of doing the exercises regularly before you notice them working. If you have been doing pelvic floor muscle exercises for several months and they don't seem to be making a difference, tell your doctor or nurse. They might suggest seeing a physical therapist or trying other treatments for your condition.  All topics are updated as new evidence becomes available and our peer review process is complete.  This topic retrieved from [employer] on: Feb 26, 2024.  Topic 98185 Version " "15.0  Release: 32.2.4 - C32.56  © 2024 UpToDate, Inc. and/or its affiliates. All rights reserved.  figure 1: Pelvic floor muscles     The \"pelvic floor\" is a group of muscles that support the organs in the pelvis. In females, these organs include the uterus, bladder, and rectum.  Graphic 223887 Version 2.0  Consumer Information Use and Disclaimer   Disclaimer: This generalized information is a limited summary of diagnosis, treatment, and/or medication information. It is not meant to be comprehensive and should be used as a tool to help the user understand and/or assess potential diagnostic and treatment options. It does NOT include all information about conditions, treatments, medications, side effects, or risks that may apply to a specific patient. It is not intended to be medical advice or a substitute for the medical advice, diagnosis, or treatment of a health care provider based on the health care provider's examination and assessment of a patient's specific and unique circumstances. Patients must speak with a health care provider for complete information about their health, medical questions, and treatment options, including any risks or benefits regarding use of medications. This information does not endorse any treatments or medications as safe, effective, or approved for treating a specific patient. UpToDate, Inc. and its affiliates disclaim any warranty or liability relating to this information or the use thereof.The use of this information is governed by the Terms of Use, available at https://www.[employer].com/en/know/clinical-effectiveness-terms. 2024© UpToDate, Inc. and its affiliates and/or licensors. All rights reserved.  Copyright   © 2024 UpToDate, Inc. and/or its affiliates. All rights reserved.    Medicare Preventive Visit Patient Instructions  Thank you for completing your Welcome to Medicare Visit or Medicare Annual Wellness Visit today. Your next wellness visit will be due in one year " (10/3/2025).  The screening/preventive services that you may require over the next 5-10 years are detailed below. Some tests may not apply to you based off risk factors and/or age. Screening tests ordered at today's visit but not completed yet may show as past due. Also, please note that scanned in results may not display below.  Preventive Screenings:  Service Recommendations Previous Testing/Comments   Colorectal Cancer Screening  * Colonoscopy    * Fecal Occult Blood Test (FOBT)/Fecal Immunochemical Test (FIT)  * Fecal DNA/Cologuard Test  * Flexible Sigmoidoscopy Age: 45-75 years old   Colonoscopy: every 10 years (may be performed more frequently if at higher risk)  OR  FOBT/FIT: every 1 year  OR  Cologuard: every 3 years  OR  Sigmoidoscopy: every 5 years  Screening may be recommended earlier than age 45 if at higher risk for colorectal cancer. Also, an individualized decision between you and your healthcare provider will decide whether screening between the ages of 76-85 would be appropriate. Colonoscopy: Not on file  FOBT/FIT: Not on file  Cologuard: Not on file  Sigmoidoscopy: Not on file          Breast Cancer Screening Age: 40+ years old  Frequency: every 1-2 years  Not required if history of left and right mastectomy Mammogram: Not on file        Cervical Cancer Screening Between the ages of 21-29, pap smear recommended once every 3 years.   Between the ages of 30-65, can perform pap smear with HPV co-testing every 5 years.   Recommendations may differ for women with a history of total hysterectomy, cervical cancer, or abnormal pap smears in past. Pap Smear: Not on file    Screening Not Indicated   Hepatitis C Screening Once for adults born between 1945 and 1965  More frequently in patients at high risk for Hepatitis C Hep C Antibody: Not on file        Diabetes Screening 1-2 times per year if you're at risk for diabetes or have pre-diabetes Fasting glucose: 120 mg/dL (9/27/2024)  A1C: 5.6 %  (9/27/2024)  Screening Current   Cholesterol Screening Once every 5 years if you don't have a lipid disorder. May order more often based on risk factors. Lipid panel: 09/27/2024    Screening Not Indicated  History Lipid Disorder     Other Preventive Screenings Covered by Medicare:  Abdominal Aortic Aneurysm (AAA) Screening: covered once if your at risk. You're considered to be at risk if you have a family history of AAA.  Lung Cancer Screening: covers low dose CT scan once per year if you meet all of the following conditions: (1) Age 55-77; (2) No signs or symptoms of lung cancer; (3) Current smoker or have quit smoking within the last 15 years; (4) You have a tobacco smoking history of at least 20 pack years (packs per day multiplied by number of years you smoked); (5) You get a written order from a healthcare provider.  Glaucoma Screening: covered annually if you're considered high risk: (1) You have diabetes OR (2) Family history of glaucoma OR (3)  aged 50 and older OR (4)  American aged 65 and older  Osteoporosis Screening: covered every 2 years if you meet one of the following conditions: (1) You're estrogen deficient and at risk for osteoporosis based off medical history and other findings; (2) Have a vertebral abnormality; (3) On glucocorticoid therapy for more than 3 months; (4) Have primary hyperparathyroidism; (5) On osteoporosis medications and need to assess response to drug therapy.   Last bone density test (DXA Scan): Not on file.  HIV Screening: covered annually if you're between the age of 15-65. Also covered annually if you are younger than 15 and older than 65 with risk factors for HIV infection. For pregnant patients, it is covered up to 3 times per pregnancy.    Immunizations:  Immunization Recommendations   Influenza Vaccine Annual influenza vaccination during flu season is recommended for all persons aged >= 6 months who do not have contraindications   Pneumococcal  Vaccine   * Pneumococcal conjugate vaccine = PCV13 (Prevnar 13), PCV15 (Vaxneuvance), PCV20 (Prevnar 20)  * Pneumococcal polysaccharide vaccine = PPSV23 (Pneumovax) Adults 19-63 yo with certain risk factors or if 65+ yo  If never received any pneumonia vaccine: recommend Prevnar 20 (PCV20)  Give PCV20 if previously received 1 dose of PCV13 or PPSV23   Hepatitis B Vaccine 3 dose series if at intermediate or high risk (ex: diabetes, end stage renal disease, liver disease)   Respiratory syncytial virus (RSV) Vaccine - COVERED BY MEDICARE PART D  * RSVPreF3 (Arexvy) CDC recommends that adults 60 years of age and older may receive a single dose of RSV vaccine using shared clinical decision-making (SCDM)   Tetanus (Td) Vaccine - COST NOT COVERED BY MEDICARE PART B Following completion of primary series, a booster dose should be given every 10 years to maintain immunity against tetanus. Td may also be given as tetanus wound prophylaxis.   Tdap Vaccine - COST NOT COVERED BY MEDICARE PART B Recommended at least once for all adults. For pregnant patients, recommended with each pregnancy.   Shingles Vaccine (Shingrix) - COST NOT COVERED BY MEDICARE PART B  2 shot series recommended in those 19 years and older who have or will have weakened immune systems or those 50 years and older     Health Maintenance Due:  There are no preventive care reminders to display for this patient.  Immunizations Due:      Topic Date Due    Influenza Vaccine (1) 09/01/2024     Advance Directives   What are advance directives?  Advance directives are legal documents that state your wishes and plans for medical care. These plans are made ahead of time in case you lose your ability to make decisions for yourself. Advance directives can apply to any medical decision, such as the treatments you want, and if you want to donate organs.   What are the types of advance directives?  There are many types of advance directives, and each state has rules about  how to use them. You may choose a combination of any of the following:  Living will:  This is a written record of the treatment you want. You can also choose which treatments you do not want, which to limit, and which to stop at a certain time. This includes surgery, medicine, IV fluid, and tube feedings.   Durable power of  for healthcare (DPAHC):  This is a written record that states who you want to make healthcare choices for you when you are unable to make them for yourself. This person, called a proxy, is usually a family member or a friend. You may choose more than 1 proxy.  Do not resuscitate (DNR) order:  A DNR order is used in case your heart stops beating or you stop breathing. It is a request not to have certain forms of treatment, such as CPR. A DNR order may be included in other types of advance directives.  Medical directive:  This covers the care that you want if you are in a coma, near death, or unable to make decisions for yourself. You can list the treatments you want for each condition. Treatment may include pain medicine, surgery, blood transfusions, dialysis, IV or tube feedings, and a ventilator (breathing machine).  Values history:  This document has questions about your views, beliefs, and how you feel and think about life. This information can help others choose the care that you would choose.  Why are advance directives important?  An advance directive helps you control your care. Although spoken wishes may be used, it is better to have your wishes written down. Spoken wishes can be misunderstood, or not followed. Treatments may be given even if you do not want them. An advance directive may make it easier for your family to make difficult choices about your care.   Urinary Incontinence   Urinary incontinence (UI)  is when you lose control of your bladder. UI develops because your bladder cannot store or empty urine properly. The 3 most common types of UI are stress incontinence,  urge incontinence, or both.  Medicines:   May be given to help strengthen your bladder control. Report any side effects of medication to your healthcare provider.  Do pelvic muscle exercises often:  Your pelvic muscles help you stop urinating. Squeeze these muscles tight for 5 seconds, then relax for 5 seconds. Gradually work up to squeezing for 10 seconds. Do 3 sets of 15 repetitions a day, or as directed. This will help strengthen your pelvic muscles and improve bladder control.  Train your bladder:  Go to the bathroom at set times, such as every 2 hours, even if you do not feel the urge to go. You can also try to hold your urine when you feel the urge to go. For example, hold your urine for 5 minutes when you feel the urge to go. As that becomes easier, hold your urine for 10 minutes.   Self-care:   Keep a UI record.  Write down how often you leak urine and how much you leak. Make a note of what you were doing when you leaked urine.  Drink liquids as directed. You may need to limit the amount of liquid you drink to help control your urine leakage. Do not drink any liquid right before you go to bed. Limit or do not have drinks that contain caffeine or alcohol.   Prevent constipation.  Eat a variety of high-fiber foods. Good examples are high-fiber cereals, beans, vegetables, and whole-grain breads. Walking is the best way to trigger your intestines to have a bowel movement.  Exercise regularly and maintain a healthy weight.  Weight loss and exercise will decrease pressure on your bladder and help you control your leakage.   Use a catheter as directed  to help empty your bladder. A catheter is a tiny, plastic tube that is put into your bladder to drain your urine.   Go to behavior therapy as directed.  Behavior therapy may be used to help you learn to control your urge to urinate.    Alcohol Use and Your Health    Drinking too much can harm your health.  Excessive alcohol use leads to about 88,000 death in the  "[place] each year, and shortens the life of those who diet by almost 30 years.  Further, excessive drinking cost the economy $249 billion in 2010.  Most excessive drinkers are not alcohol dependent.    Excessive alcohol use has immediate effects that increase the risk of many harmful health conditions.  These are most often the result of binge drinking.  Over time, excessive alcohol use can lead to the development of chronic diseases and other series health problems.    What is considered a \"drink\"?        Excessive alcohol use includes:  Binge Drinking: For women, 4 or more drinks consumed on one occasion. For men, 5 or more drinks consumed on one occasion.  Heavy Drinking: For women, 8 or more drinks per week. For men, 15 or more drinks per week  Any alcohol used by pregnant women  Any alcohol used by those under the age of 21 years    If you choose to drink, do so in moderation:  Do not drink at all if you are under the age of 21, or if you are or may be pregnant, or have health problems that could be made worse by drinking.  For women, up to 1 drink per day  For men, up to 2 drinks a day    No one should begin drinking or drink more frequently based on potential health benefits    Short-Term Health Risks:  Injuries: motor vehicle crashes, falls, drownings, burns  Violence: homicide, suicide, sexual assault, intimate partner violence  Alcohol poisoning  Reproductive health: risky sexual behaviors, unintended prengnacy, sexually transmitted diseases, miscarriage, stillbirth, fetal alcohol syndrome    Long-Term Health Risks:  Chronic diseases: high blood pressure, heart disease, stroke, liver disease, digestive problems  Cancers: breast, mouth and throat, liver, colon  Learning and memory problems: dementia, poor school performance  Mental health: depression, anxiety, insomnia  Social problems: lost productivity, family problems, unemployment  Alcohol dependence    For support and more information:  Substance " Abuse and Mental Health Services Administration  [address]  [place] [address]  Web Address: http://www.[facility].gov    Alcoholics Anonymous        Web Address: http://www.aa.org    https://www.cdc.gov/alcohol/fact-sheets/alcohol-use.htm     © Copyright [employer] 2018 Information is for End User's use only and may not be sold, redistributed or otherwise used for commercial purposes. All illustrations and images included in CareNotes® are the copyrighted property of [employer] or [employer]

## 2024-10-02 NOTE — PROGRESS NOTES
Ambulatory Visit  Name: Chloé Anderson      : 1945      MRN: [id number]  Encounter Provider: Carline Howard DO  Encounter Date: 10/2/2024   Encounter department: [facility]      Orders Placed This Encounter   Procedures    Comprehensive metabolic panel    Magnesium    Comprehensive metabolic panel    TSH, 3rd generation with Free T4 reflex    LDL cholesterol, direct    Magnesium    Ambulatory Referral to Ophthalmology        Preventive health issues were discussed with patient, and age appropriate screening tests were ordered as noted in patient's After Visit Summary. Personalized health advice and appropriate referrals for health education or preventive services given if needed, as noted in patient's After Visit Summary.    History of Present Illness         Patient Care Team:  Carline Howard DO as PCP - General (Family Medicine)  Carline Howard DO (Family Medicine)    Review of Systems    See other note.      Annual Wellness Visit Questionnaire   Chloé is here for her Subsequent Wellness visit.     Health Risk Assessment:   Patient rates overall health as good. Patient feels that their physical health rating is slightly worse. Patient is very satisfied with their life. Eyesight was rated as same. Hearing was rated as same. Patient feels that their emotional and mental health rating is same. Patients states they are never, rarely angry. Patient states they are sometimes unusually tired/fatigued. Pain experienced in the last 7 days has been none. Patient states that she has experienced no weight loss or gain in last 6 months.     Fall Risk Screening:   In the past year, patient has experienced: no history of falling in past year      Urinary Incontinence Screening:   Patient has leaked urine accidently in the last six months. UTI that has now gone.    Home Safety:  Patient does not have trouble with stairs inside or outside of their home. Patient has working smoke alarms  and has working carbon monoxide detector. Home safety hazards include: none.     Nutrition:   Current diet is Regular, Low Saturated Fat and No Added Salt.     Medications:   Patient is currently taking over-the-counter supplements. OTC medications include: see medication list. Patient is able to manage medications.     Activities of Daily Living (ADLs)/Instrumental Activities of Daily Living (IADLs):   Walk and transfer into and out of bed and chair?: Yes  Dress and groom yourself?: Yes    Bathe or shower yourself?: Yes    Feed yourself? Yes  Do your laundry/housekeeping?: Yes  Manage your money, pay your bills and track your expenses?: Yes  Make your own meals?: Yes    Do your own shopping?: Yes    Previous Hospitalizations:   Any hospitalizations or ED visits within the last 12 months?: Yes    How many hospitalizations have you had in the last year?: 1-2    Advance Care Planning:   Living will: Yes    Durable POA for healthcare: Yes    Advanced directive: Yes    Advanced directive counseling given: Yes    Patient declined ACP directive: Yes      Comments: Living Will, POLST, and Five Wishes given today.      Cognitive Screening:   Provider or family/friend/caregiver concerned regarding cognition?: No    PREVENTIVE SCREENINGS      Cardiovascular Screening:    General: Screening Not Indicated and History Lipid Disorder      Diabetes Screening:     General: Screening Current      Colorectal Cancer Screening:     General: Screening Not Indicated      Breast Cancer Screening:     General: Screening Not Indicated      Cervical Cancer Screening:    General: Screening Not Indicated      Osteoporosis Screening:    General: Screening Not Indicated and History Osteoporosis      Abdominal Aortic Aneurysm (AAA) Screening:        General: Screening Not Indicated      Lung Cancer Screening:     General: Screening Not Indicated      Hepatitis C Screening:    General: Screening Not Indicated    Screening, Brief Intervention, and  Referral to Treatment (SBIRT)    Screening  Typical number of drinks in a day: 0  Typical number of drinks in a week: 0  Interpretation: Low risk drinking behavior.    AUDIT-C Screenin) How often did you have a drink containing alcohol in the past year? 2 to 3 times a week  2) How many drinks did you have on a typical day when you were drinking in the past year? 1 to 2  3) How often did you have 6 or more drinks on one occasion in the past year? never    AUDIT-C Score: 3  Interpretation: Score 3-12 (female): POSITIVE screen for alcohol misuse    AUDIT Screenin) How often during the last year have you found that you were not able to stop drinking once you had started? 0 - never  5) How often during the last year have you failed to do what was normally expected from you because of drinking? 0 - never  6) How often during the last year have you needed a first drink in the morning to get yourself going after a heavy drinking session? 0 - never  7) How often during the last year have you had a feeling of guilt or remorse after drinking? 0 - never  8) How often during the last year have you been unable to remember what happened the night before because you had been drinking? 0 - never  9) Have you or someone else been injured as a result of your drinking? 0 - no  10) Has a relative or friend or a doctor or another health worker been concerned about your drinking or suggested you cut down? 0 - no    AUDIT Score: 3  Interpretation: Low risk alcohol consumption    Single Item Drug Screening:  How often have you used an illegal drug (including marijuana) or a prescription medication for non-medical reasons in the past year? never    Single Item Drug Screen Score: 0  Interpretation: Negative screen for possible drug use disorder    Brief Intervention  Alcohol & drug use screenings were reviewed. No concerns regarding substance use disorder identified.     Time Spent  Time spent screening/evaluating the patient for  "alcohol misuse: 5 minutes.     Annual Depression Screening  Time spent screening and evaluating the patient for depression during today's encounter was 5 minutes.    SDOH Risk Assessment  Social determinants of health (SDOH) risk assesment tool was completed. The tool at a minimum covered housing stability, food insecurity, transportation needs, and utility difficulty. Patient had at risk responses for the following SDOH domains: food insecurity and housing stability. Patient declines SDOH issues.  .     Other Counseling Topics:   Calcium and vitamin D intake and regular weightbearing exercise.     Social Determinants of Health     Food Insecurity: Food Insecurity Present (9/25/2024)    Hunger Vital Sign     Worried About Running Out of Food in the Last Year: Never true     Ran Out of Food in the Last Year: Sometimes true   Transportation Needs: No Transportation Needs (9/25/2024)    PRAPARE - Transportation     Lack of Transportation (Medical): No     Lack of Transportation (Non-Medical): No   Housing Stability: High Risk (9/25/2024)    Housing Stability Vital Sign     Unable to Pay for Housing in the Last Year: No     Number of Times Moved in the Last Year: 3     Homeless in the Last Year: No   Utilities: Not At Risk (9/25/2024)    [facility] Utilities     Threatened with loss of utilities: No     No results found.    Objective     /78   Pulse 60   Temp 98.7 °F (37.1 °C)   Resp 16   Ht 5' 1\" (1.549 m)   Wt 59.9 kg (132 lb)   SpO2 99%   BMI 24.94 kg/m²       Physical Exam    See other note.        "

## 2024-10-03 ENCOUNTER — TELEPHONE (OUTPATIENT)
Dept: ADMINISTRATIVE | Facility: OTHER | Age: 79
End: 2024-10-03

## 2024-10-03 NOTE — TELEPHONE ENCOUNTER
----- Message from Carline Howard DO sent at 10/2/2024 11:49 AM EDT -----  Regarding: Dexa 6/5/23 - Media Tab To Be Scanned  10/02/24 11:49 AM    Hello, our patient Chloé Anderson has had DEXA Scan completed/performed. Please assist in updating the patient chart by pulling the document from the Media Tab. The date of service is .    Dexa 6/5/23 - Media Tab To Be Scanned     Thank you,  Carline Howard DO  [facility]

## 2024-10-03 NOTE — ASSESSMENT & PLAN NOTE
Orders:    TSH, 3rd generation with Free T4 reflex; Future    LDL cholesterol, direct; Future    Stable.  Not on statin currently.  Recommend lifestyle modifications.

## 2024-10-03 NOTE — ASSESSMENT & PLAN NOTE
Orders:    TSH, 3rd generation with Free T4 reflex; Future    Stable.  Recommend lifestyle modifications.

## 2024-10-03 NOTE — ASSESSMENT & PLAN NOTE
Orders:    Comprehensive metabolic panel; Future    Magnesium; Future    Comprehensive metabolic panel; Future    Magnesium; Future      Low Magnesium - Start over the counter Magnesium glycinate 400mg 1 pill by mouth daily.  Check CMP and Magnesium in 1 week.

## 2024-10-03 NOTE — ASSESSMENT & PLAN NOTE
Orders:    Comprehensive metabolic panel; Future    Magnesium; Future      Uncontrolled.  S/p recent Increase Lisinopril 20mg QD and increase Bystolic 20mg QD.  Continue to monitor on current regimen.  Pending Cardio consult.  Check blood pressure outside of office.  Recommend lifestyle modifications.

## 2024-10-03 NOTE — ASSESSMENT & PLAN NOTE
Orders:    Comprehensive metabolic panel; Future    S/p Renal consult at previous residence - records requested.  Continue fluid restriction and continue electrolyte powder.

## 2024-10-03 NOTE — ASSESSMENT & PLAN NOTE
Orders:    TSH, 3rd generation with Free T4 reflex; Future    Pending Endo consult to discuss treatment options.

## 2024-10-05 ENCOUNTER — OFFICE VISIT (OUTPATIENT)
Dept: URGENT CARE | Facility: CLINIC | Age: 79
End: 2024-10-05
Payer: MEDICARE

## 2024-10-05 VITALS
OXYGEN SATURATION: 98 % | SYSTOLIC BLOOD PRESSURE: 163 MMHG | TEMPERATURE: 97.9 F | HEART RATE: 56 BPM | DIASTOLIC BLOOD PRESSURE: 92 MMHG | RESPIRATION RATE: 20 BRPM

## 2024-10-05 DIAGNOSIS — R39.9 UTI SYMPTOMS: Primary | ICD-10-CM

## 2024-10-05 LAB
SL AMB  POCT GLUCOSE, UA: ABNORMAL
SL AMB LEUKOCYTE ESTERASE,UA: ABNORMAL
SL AMB POCT BILIRUBIN,UA: ABNORMAL
SL AMB POCT BLOOD,UA: ABNORMAL
SL AMB POCT CLARITY,UA: ABNORMAL
SL AMB POCT COLOR,UA: YELLOW
SL AMB POCT KETONES,UA: ABNORMAL
SL AMB POCT NITRITE,UA: ABNORMAL
SL AMB POCT PH,UA: 8
SL AMB POCT SPECIFIC GRAVITY,UA: 1.01
SL AMB POCT URINE PROTEIN: 30
SL AMB POCT UROBILINOGEN: 0.2

## 2024-10-05 PROCEDURE — 99213 OFFICE O/P EST LOW 20 MIN: CPT | Performed by: FAMILY MEDICINE

## 2024-10-05 PROCEDURE — 87186 SC STD MICRODIL/AGAR DIL: CPT | Performed by: FAMILY MEDICINE

## 2024-10-05 PROCEDURE — 87077 CULTURE AEROBIC IDENTIFY: CPT | Performed by: FAMILY MEDICINE

## 2024-10-05 PROCEDURE — 81002 URINALYSIS NONAUTO W/O SCOPE: CPT | Performed by: FAMILY MEDICINE

## 2024-10-05 PROCEDURE — G0463 HOSPITAL OUTPT CLINIC VISIT: HCPCS | Performed by: FAMILY MEDICINE

## 2024-10-05 PROCEDURE — 87086 URINE CULTURE/COLONY COUNT: CPT | Performed by: FAMILY MEDICINE

## 2024-10-05 RX ORDER — FLUCONAZOLE 150 MG/1
150 TABLET ORAL ONCE
Qty: 1 TABLET | Refills: 0 | Status: SHIPPED | OUTPATIENT
Start: 2024-10-05 | End: 2024-10-05

## 2024-10-05 RX ORDER — CEPHALEXIN 500 MG/1
500 CAPSULE ORAL EVERY 12 HOURS SCHEDULED
Qty: 14 CAPSULE | Refills: 0 | Status: SHIPPED | OUTPATIENT
Start: 2024-10-05 | End: 2024-10-12

## 2024-10-07 NOTE — TELEPHONE ENCOUNTER
Upon review of the In Basket request we have found/obtained the documentation. The status of this report is in progress/pending/awaiting final. Due to protocols, we are unable to hold requests for resulting/linking of a pending/ in progress/awaiting final items and are unable to proceed.     Any additional questions or concerns should be emailed to the Practice Liaisons via the appropriate education email address, please do not reply via In Basket.    Thank you  Diane Carpenter MA   [facility]

## 2024-10-07 NOTE — PATIENT INSTRUCTIONS
- urine dip test performed in office today shows positive blood and leukocytes.   - urine sample sent for culture testing, patient has been instructed to call the office in 2-3 days to follow up culture results.   - Keflex x 7 days prescribed, complete as directed   - may take Tylenol or Motrin as needed for pain   - patient is to rest and drink plenty of fluids  - follow up w/ PCP office for re-check in 3-5 days   - if symptoms persist despite treatment, worsen, or any new symptoms present, patient is to be seen in the ER.

## 2024-10-07 NOTE — PROGRESS NOTES
[facility]        NAME: Chloé Anderson is a 79 y.o. female  : 1945    MRN: [id number]  DATE: 2024  TIME: 6:00 PM     Assessment and Plan   UTI symptoms [R39.9]  1. UTI symptoms  POCT urine dip    cephalexin (KEFLEX) 500 mg capsule    fluconazole (DIFLUCAN) 150 mg tablet    Urine culture        Patient Instructions     Patient Instructions   - urine dip test performed in office today shows positive blood and leukocytes.   - urine sample sent for culture testing, patient has been instructed to call the office in 2-3 days to follow up culture results.   - Keflex x 7 days prescribed, complete as directed   - may take Tylenol or Motrin as needed for pain   - patient is to rest and drink plenty of fluids  - follow up w/ PCP office for re-check in 3-5 days   - if symptoms persist despite treatment, worsen, or any new symptoms present, patient is to be seen in the ER.    Follow up with PCP in 3-5 days.  Proceed to  ER if symptoms worsen.    If tests have been performed at [facility], our office will contact you with results if changes need to be made to the care plan discussed with you at the visit.  [facility]    Chief Complaint     Chief Complaint   Patient presents with    Possible UTI     Sx started last night, dysuria, urgency, incontinence.     History of Present Illness     80 yo female presents for a possible UTI. She is experiencing suprapubic pressure, dysuria, and urinary urgency and frequency. Her symptoms began yesterday evening. She also states she is experiencing some urinary incontinence which is not typical for her. No vaginal bleeding or discharge. No vaginal itching or burning. No nausea/vomiting or diarrhea. No abdominal or pelvic pain. No flank pain. No fever/chills. She has not attempted any treatment for the current symptoms. Patient has known antibiotic allergy to Bactrim. Urine dip performed in office today is positive for blood  and leukocytes. Patient was seen in the office for a UTI last month. Urine culture shows + growth of E. Coli which was resistant to Ciprofloxacin therefore patient was switched to Macrobid. She states she completed the Macrobid as prescribed and her symptoms resolved at the time. Urine culture showed susceptibility to Cephalosporins.       Review of Systems   Review of Systems   Constitutional: Negative.    Respiratory: Negative.     Cardiovascular: Negative.    Gastrointestinal: Negative.    Genitourinary:         As noted in HPI   Musculoskeletal: Negative.    Skin: Negative.    Allergic/Immunologic:        Multiple allergies, as noted in chart.   Neurological: Negative.    Hematological: Negative.      Current Medications       Current Outpatient Medications:     Calcium 500 MG tablet, Take 500 mg by mouth in the morning, Disp: , Rfl:     cephalexin (KEFLEX) 500 mg capsule, Take 1 capsule (500 mg total) by mouth every 12 (twelve) hours for 7 days, Disp: 14 capsule, Rfl: 0    Cholecalciferol (Vitamin D3) 50 MCG (2000 UT) TABS, Take 1 tablet by mouth daily, Disp: , Rfl:     famotidine (PEPCID) 20 mg tablet, Take 1 tablet (20 mg total) by mouth 2 (two) times a day as needed for heartburn, Disp: 60 tablet, Rfl: 1    lisinopril (ZESTRIL) 20 mg tablet, Take 1 tablet (20 mg total) by mouth daily, Disp: 30 tablet, Rfl: 1    Magnesium Bisglycinate 100 MG TABS, Take 400 mg by mouth in the morning, Disp: , Rfl:     mometasone (ELOCON) 0.1 % cream, Apply topically 2 (two) times a day as needed (Ear Eczema), Disp: 15 g, Rfl: 0    nebivolol (BYSTOLIC) 20 MG tablet, Take 1 tablet (20 mg total) by mouth daily, Disp: 30 tablet, Rfl: 1    Sodium Fluoride 5000 PPM 1.1 % GEL, Take 1 Application by mouth daily at bedtime, Disp: , Rfl:     triamcinolone (KENALOG) 0.1 % cream, Apply 1 Application topically 2 (two) times a day as needed for rash, Disp: , Rfl:     Current Allergies     Allergies as of 10/05/2024 - Reviewed 10/05/2024    Allergen Reaction Noted    Amlodipine Shortness Of Breath 2024    Telmisartan Shortness Of Breath 2024    Lisinopril Cough and Other (See Comments) 2024    Bactrim [sulfamethoxazole-trimethoprim] GI Intolerance 2024    Codeine Nausea Only and Vomiting 2024    Fluoride Nausea Only and Other (See Comments) 2024    Hydralazine Hives 2024    Hydrocodone Other (See Comments) 2024    Indapamide Other (See Comments) 2024    Losartan GI Intolerance 2024    Oxycodone GI Intolerance and Vomiting 2024            The following portions of the patient's history were reviewed and updated as appropriate: allergies, current medications, past family history, past medical history, past social history, past surgical history and problem list.     Past Medical History:   Diagnosis Date    Anxiety     Aortic regurgitation 2024    Baker's cyst     Eczema 2024    GERD (gastroesophageal reflux disease)     Grade II diastolic dysfunction 2024    Hyperlipidemia     Hypertension     Hypertensive retinopathy of both eyes 10/02/2024    Hypomagnesemia 10/02/2024    Hyponatremia 10/02/2024    Left atrial enlargement     Mitral regurgitation 2024    Mitral valve sclerosis     Osteoarthritis     Osteoporosis     Overweight 2024    Posterior vitreous detachment of left eye 10/02/2024    Pulmonary hypertension (HCC)     Stable branch retinal vein occlusion of left eye 10/02/2024    Tricuspid regurgitation 2024    Vitreous debris 10/02/2024    Vitreous syneresis of right eye 10/02/2024    White without pressure of peripheral retina of both eyes 10/02/2024       Past Surgical History:   Procedure Laterality Date    CATARACT EXTRACTION Bilateral 2010     SECTION  1972    EGD  2022    LAPAROSCOPIC TOTAL HYSTERECTOMY  2016    Uterine Prolapse    STRABISMUS SURGERY Bilateral 1955       Family History   Problem Relation Age of  Onset    Heart failure Mother         passed at 90    Cataracts Mother     Anxiety disorder Mother     Alcohol abuse Father     Stroke Father     Heart attack Father         passed at 69    Heart disease Father     Cancer Sister         passed at 78    Anxiety disorder Sister     Obesity Brother         passed at 69         Medications have been verified.        Objective   /92   Pulse 56   Temp 97.9 °F (36.6 °C) (Temporal)   Resp 20   SpO2 98%   No LMP recorded.       Physical Exam     Physical Exam  Vitals and nursing note reviewed.   Constitutional:       General: She is awake. She is not in acute distress.     Appearance: Normal appearance. She is well-developed and well-groomed. She is not ill-appearing, toxic-appearing or diaphoretic.   Cardiovascular:      Rate and Rhythm: Normal rate and regular rhythm.      Pulses: Normal pulses.      Heart sounds: Normal heart sounds.   Pulmonary:      Effort: Pulmonary effort is normal. No tachypnea, accessory muscle usage or respiratory distress.      Breath sounds: Normal breath sounds and air entry.   Abdominal:      General: Abdomen is flat. There is no distension.      Palpations: Abdomen is soft.      Tenderness: There is no abdominal tenderness. There is no right CVA tenderness, left CVA tenderness, guarding or rebound.   Skin:     General: Skin is warm and dry.      Capillary Refill: Capillary refill takes less than 2 seconds.      Coloration: Skin is not pale.   Neurological:      Mental Status: She is alert and oriented to person, place, and time. Mental status is at baseline.   Psychiatric:         Mood and Affect: Mood normal.         Behavior: Behavior normal. Behavior is cooperative.         Thought Content: Thought content normal.         Judgment: Judgment normal.

## 2024-10-07 NOTE — TELEPHONE ENCOUNTER
Clerical to please follow-up on this.  I gave copy of Dexa scan to clerical last week to scan in office so that it would be available for [facility].

## 2024-10-08 LAB — BACTERIA UR CULT: ABNORMAL

## 2024-10-17 ENCOUNTER — OFFICE VISIT (OUTPATIENT)
Dept: URGENT CARE | Facility: CLINIC | Age: 79
End: 2024-10-17
Payer: MEDICARE

## 2024-10-17 VITALS
HEART RATE: 70 BPM | BODY MASS INDEX: 24.92 KG/M2 | SYSTOLIC BLOOD PRESSURE: 158 MMHG | HEIGHT: 61 IN | TEMPERATURE: 97.5 F | DIASTOLIC BLOOD PRESSURE: 93 MMHG | OXYGEN SATURATION: 97 % | WEIGHT: 132 LBS | RESPIRATION RATE: 18 BRPM

## 2024-10-17 DIAGNOSIS — N30.00 ACUTE CYSTITIS WITHOUT HEMATURIA: Primary | ICD-10-CM

## 2024-10-17 LAB
SL AMB  POCT GLUCOSE, UA: NORMAL
SL AMB LEUKOCYTE ESTERASE,UA: NORMAL
SL AMB POCT BILIRUBIN,UA: NORMAL
SL AMB POCT BLOOD,UA: NORMAL
SL AMB POCT CLARITY,UA: CLEAR
SL AMB POCT COLOR,UA: YELLOW
SL AMB POCT KETONES,UA: NORMAL
SL AMB POCT NITRITE,UA: NORMAL
SL AMB POCT PH,UA: 7.5
SL AMB POCT SPECIFIC GRAVITY,UA: 1
SL AMB POCT URINE PROTEIN: 30
SL AMB POCT UROBILINOGEN: 0.2

## 2024-10-17 PROCEDURE — 87086 URINE CULTURE/COLONY COUNT: CPT | Performed by: NURSE PRACTITIONER

## 2024-10-17 PROCEDURE — 81002 URINALYSIS NONAUTO W/O SCOPE: CPT | Performed by: NURSE PRACTITIONER

## 2024-10-17 PROCEDURE — 99213 OFFICE O/P EST LOW 20 MIN: CPT | Performed by: NURSE PRACTITIONER

## 2024-10-17 PROCEDURE — G0463 HOSPITAL OUTPT CLINIC VISIT: HCPCS | Performed by: NURSE PRACTITIONER

## 2024-10-17 RX ORDER — NITROFURANTOIN 25; 75 MG/1; MG/1
100 CAPSULE ORAL 2 TIMES DAILY
Qty: 14 CAPSULE | Refills: 0 | Status: SHIPPED | OUTPATIENT
Start: 2024-10-17 | End: 2024-10-24

## 2024-10-17 NOTE — PROGRESS NOTES
[facility]        NAME: Chloé Anderson is a 79 y.o. female  : 1945    MRN: [id number]  DATE: 2024  TIME: 1:27 PM    Assessment and Plan   Acute cystitis without hematuria [N30.00]  1. Acute cystitis without hematuria  POCT urine dip    Urine culture    nitrofurantoin (MACROBID) 100 mg capsule    Urine culture        Urine dip today in office is negative for leukocytes, nitrites, or blood.  There are 30+ protein.  Will send for culture.  Prior to urine cultures were reviewed and were both positive for E. coli.  The first time there was less than 19,000 bacteria and the second time there was less than 60,000 bacteria.  She was treated both times with antibiotics with complete resolution of symptoms.  She uses a sitz bath on a regular basis.  She is also using topical Monistat cream and Preparation H.  We discussed discontinuing use of sitz bath as this may be the cause of her E. coli urinary tract infections.  Also discussed the order in which she will provide her personal hygiene care.    Patient Instructions       Follow up with PCP in 3-5 days.  Proceed to  ER if symptoms worsen.    Chief Complaint     Chief Complaint   Patient presents with    Possible UTI     She states she has burning frequency and urgency that started tuesday. She had a UTI oct jose 5th and now symptoms are back.          History of Present Illness       Patient is an 79-year-old female presenting with concerns of urinary tract infection.  She reports dysuria, a stinging sensation, and urgency that started yesterday.  Denies fever or chills.  Denies abdominal pain or back pain.  She notes this is her third episode of urinary symptoms in the past 1 month.  She admits to using a sitz bath's on a regular basis due to hemorrhoids.  She is also applying Preparation H.  She is also using Monistat cream for exterior vaginal itching.        Review of Systems   Review of Systems   Constitutional:  Negative for activity  change, chills and fever.   Respiratory:  Negative for cough and shortness of breath.    Gastrointestinal:  Negative for abdominal pain.   Genitourinary:  Positive for dysuria, frequency and urgency. Negative for difficulty urinating and flank pain.   Neurological:  Negative for headaches.         Current Medications       Current Outpatient Medications:     Calcium 500 MG tablet, Take 500 mg by mouth in the morning, Disp: , Rfl:     Cholecalciferol (Vitamin D3) 50 MCG (2000 UT) TABS, Take 1 tablet by mouth daily, Disp: , Rfl:     famotidine (PEPCID) 20 mg tablet, Take 1 tablet (20 mg total) by mouth 2 (two) times a day as needed for heartburn, Disp: 60 tablet, Rfl: 1    lisinopril (ZESTRIL) 20 mg tablet, Take 1 tablet (20 mg total) by mouth daily, Disp: 30 tablet, Rfl: 1    Magnesium Bisglycinate 100 MG TABS, Take 400 mg by mouth in the morning, Disp: , Rfl:     mometasone (ELOCON) 0.1 % cream, Apply topically 2 (two) times a day as needed (Ear Eczema), Disp: 15 g, Rfl: 0    nebivolol (BYSTOLIC) 20 MG tablet, Take 1 tablet (20 mg total) by mouth daily, Disp: 30 tablet, Rfl: 1    nitrofurantoin (MACROBID) 100 mg capsule, Take 1 capsule (100 mg total) by mouth 2 (two) times a day for 7 days, Disp: 14 capsule, Rfl: 0    Sodium Fluoride 5000 PPM 1.1 % GEL, Take 1 Application by mouth daily at bedtime, Disp: , Rfl:     triamcinolone (KENALOG) 0.1 % cream, Apply 1 Application topically 2 (two) times a day as needed for rash, Disp: , Rfl:     Current Allergies     Allergies as of 10/17/2024 - Reviewed 10/17/2024   Allergen Reaction Noted    Amlodipine Shortness Of Breath 08/12/2024    Telmisartan Shortness Of Breath 08/12/2024    Lisinopril Cough and Other (See Comments) 09/24/2024    Bactrim [sulfamethoxazole-trimethoprim] GI Intolerance 08/12/2024    Codeine Nausea Only and Vomiting 09/24/2024    Fluoride Nausea Only and Other (See Comments) 09/24/2024    Hydralazine Hives 08/12/2024    Hydrocodone Other (See Comments)  2024    Indapamide Other (See Comments) 2024    Losartan GI Intolerance 2024    Oxycodone GI Intolerance and Vomiting 2024            The following portions of the patient's history were reviewed and updated as appropriate: allergies, current medications, past family history, past medical history, past social history, past surgical history and problem list.     Past Medical History:   Diagnosis Date    Anxiety     Aortic regurgitation 2024    Baker's cyst     Eczema 2024    GERD (gastroesophageal reflux disease)     Grade II diastolic dysfunction 2024    Hyperlipidemia     Hypertension     Hypertensive retinopathy of both eyes 10/02/2024    Hypomagnesemia 10/02/2024    Hyponatremia 10/02/2024    Left atrial enlargement     Mitral regurgitation 2024    Mitral valve sclerosis     Osteoarthritis     Osteoporosis     Overweight 2024    Posterior vitreous detachment of left eye 10/02/2024    Pulmonary hypertension (HCC)     Stable branch retinal vein occlusion of left eye 10/02/2024    Tricuspid regurgitation 2024    Vitreous debris 10/02/2024    Vitreous syneresis of right eye 10/02/2024    White without pressure of peripheral retina of both eyes 10/02/2024       Past Surgical History:   Procedure Laterality Date    CATARACT EXTRACTION Bilateral 2010     SECTION  1972    EGD  2022    LAPAROSCOPIC TOTAL HYSTERECTOMY  2016    Uterine Prolapse    STRABISMUS SURGERY Bilateral 1955       Family History   Problem Relation Age of Onset    Heart failure Mother         passed at 90    Cataracts Mother     Anxiety disorder Mother     Alcohol abuse Father     Stroke Father     Heart attack Father         passed at 69    Heart disease Father     Cancer Sister         passed at 78    Anxiety disorder Sister     Obesity Brother         passed at 69         Medications have been verified.        Objective   /93   Pulse 70   Temp 97.5 °F  "(36.4 °C)   Resp 18   Ht 5' 1\" (1.549 m)   Wt 59.9 kg (132 lb)   SpO2 97%   BMI 24.94 kg/m²        Physical Exam     Physical Exam  Vitals reviewed.   Constitutional:       General: She is awake. She is not in acute distress.     Appearance: Normal appearance.   Cardiovascular:      Rate and Rhythm: Normal rate.   Pulmonary:      Effort: Pulmonary effort is normal.   Abdominal:      Tenderness: There is no right CVA tenderness or left CVA tenderness.   Skin:     General: Skin is warm and moist.   Neurological:      General: No focal deficit present.      Mental Status: She is alert and oriented to person, place, and time.   Psychiatric:         Behavior: Behavior is cooperative.                   "

## 2024-10-18 DIAGNOSIS — I10 PRIMARY HYPERTENSION: ICD-10-CM

## 2024-10-18 LAB — BACTERIA UR CULT: NORMAL

## 2024-10-18 RX ORDER — NEBIVOLOL 20 MG/1
20 TABLET ORAL DAILY
Qty: 90 TABLET | Refills: 1 | Status: SHIPPED | OUTPATIENT
Start: 2024-10-18

## 2024-10-18 RX ORDER — LISINOPRIL 20 MG/1
20 TABLET ORAL DAILY
Qty: 90 TABLET | Refills: 1 | Status: SHIPPED | OUTPATIENT
Start: 2024-10-18

## 2024-10-23 ENCOUNTER — TELEPHONE (OUTPATIENT)
Age: 79
End: 2024-10-23

## 2024-10-23 ENCOUNTER — OFFICE VISIT (OUTPATIENT)
Dept: FAMILY MEDICINE CLINIC | Facility: CLINIC | Age: 79
End: 2024-10-23
Payer: MEDICARE

## 2024-10-23 VITALS
OXYGEN SATURATION: 99 % | TEMPERATURE: 98.3 F | BODY MASS INDEX: 24.81 KG/M2 | RESPIRATION RATE: 16 BRPM | DIASTOLIC BLOOD PRESSURE: 68 MMHG | SYSTOLIC BLOOD PRESSURE: 148 MMHG | HEART RATE: 75 BPM | WEIGHT: 131.4 LBS | HEIGHT: 61 IN

## 2024-10-23 DIAGNOSIS — K64.9 HEMORRHOIDS, UNSPECIFIED HEMORRHOID TYPE: ICD-10-CM

## 2024-10-23 DIAGNOSIS — I10 PRIMARY HYPERTENSION: ICD-10-CM

## 2024-10-23 DIAGNOSIS — R30.0 DYSURIA: Primary | ICD-10-CM

## 2024-10-23 DIAGNOSIS — Z87.440 HISTORY OF RECURRENT UTIS: ICD-10-CM

## 2024-10-23 DIAGNOSIS — Z23 ENCOUNTER FOR IMMUNIZATION: ICD-10-CM

## 2024-10-23 DIAGNOSIS — I27.20 PULMONARY HYPERTENSION (HCC): ICD-10-CM

## 2024-10-23 PROBLEM — E66.3 OVERWEIGHT: Status: RESOLVED | Noted: 2024-08-12 | Resolved: 2024-10-23

## 2024-10-23 PROCEDURE — 99214 OFFICE O/P EST MOD 30 MIN: CPT | Performed by: FAMILY MEDICINE

## 2024-10-23 PROCEDURE — G0008 ADMIN INFLUENZA VIRUS VAC: HCPCS | Performed by: FAMILY MEDICINE

## 2024-10-23 PROCEDURE — 90673 RIV3 VACCINE NO PRESERV IM: CPT | Performed by: FAMILY MEDICINE

## 2024-10-23 NOTE — TELEPHONE ENCOUNTER
"Hello,    The following message was sent via e-mail to the leadership team:     Please advise if you can help facilitate the following overbook request:    Patient Name: Chloé Anderson    Patient MRN: [id number]    Call back #: [phone]    Insurance: Medicare A&B    Department:Cardiology    Speciality: General Cardiology    Reason for overbook request: PATIENT REQUEST    Comments (Write \"N/a\" if no comments): Patient was recently in the hospital for chest pain on 9/10/24. She scheduled an appointment with Dr. Merritt on 10/17/24 and was placed on the wait list in the meantime, but she requested to be seen sooner specifically with Dr. Merritt at any one of his two locations if possible.     Requested doctor and location: Dr. Merritt/ [facility]    Date of current appointment: 1/7/25      Thank you.    "

## 2024-10-23 NOTE — PROGRESS NOTES
Assessment/Plan:  Assessment & Plan  Dysuria    Orders:    Ambulatory Referral to Urology; Future    Resolved.  Push fluids.    Hemorrhoids, unspecified hemorrhoid type    Orders:    Ambulatory Referral to Colorectal Surgery; Future    Push fluids, fiber.  Pending Colorectal Surgery consult PRN.    Primary hypertension       Controlled.  S/p recent Increase Lisinopril 20mg QD and increase Bystolic 20mg QD.  Continue to monitor on current regimen.  Pending Cardio consult.  Check blood pressure outside of office.  Recommend lifestyle modifications.    Pulmonary hypertension (HCC)         Pending Cardio consult.    Encounter for immunization    Orders:    influenza vaccine, recombinant, PF, 0.5 mL IM (Flublok)    History of recurrent UTIs    Orders:    Ambulatory Referral to Urology; Future       Return if symptoms worsen or fail to improve.      Future Appointments   Date Time Provider Department Center   11/7/2024 12:15 PM Ximena Garcia, PT [facility] [place] [facility]   11/21/2024 11:40 AM Michelle Buchanan MD [facility]   1/7/2025  1:40 PM Vince Merritt MD [facility]   2/5/2025 11:00 AM Carline Howard DO [facility]   2/10/2025  1:00 PM John Sebastian DO [facility]        Subjective:     Chloé is a 79 y.o. female who presents today for a follow-up on her acute medical conditions.        HPI:  Chief Complaint   Patient presents with    Urinary Tract Infection     Pt c/o persistent UTI sx for the last few weeks. Pt thinks it may have been related to SITZ baths she was doing for hemorrhoids. Has since stopped the SITZ baths.      -- Above per clinical staff and reviewed. --    HPI      Today:      Urinary symptoms - Last cystoscopy 2009  - stable per patient.  S/p Urogram 10/23 - stable per patient.      [facility]en at [facility] 10/5/24, Urine Cx c E. Coli 10-19K, treated c Keflex 500mg BID x 7 days, Diflucan 150mg Once.      Seen at [facility] 10/17/24, Urine Cx c mixed  contaminants, treated c Macorid BID x 7 days.       Today, she feels well.  She is on the last day of Macrobid - Day 7/7.      Yesterday, she felt tight chest pressure around her chest yesterday after walking.  Improved with taking Bystolic and resolved today.       The following portions of the patient's history were reviewed and updated as appropriate: allergies, current medications, past family history, past medical history, past social history, past surgical history and problem list.      Review of Systems   Constitutional:  Negative for appetite change, chills, diaphoresis, fatigue and fever.   Respiratory:  Positive for chest tightness. Negative for shortness of breath.    Cardiovascular:  Negative for chest pain.   Gastrointestinal:  Negative for abdominal pain, blood in stool, diarrhea, nausea and vomiting.   Genitourinary:  Negative for dysuria, frequency, hematuria, urgency and vaginal discharge.        Current Outpatient Medications   Medication Sig Dispense Refill    Calcium 500 MG tablet Take 500 mg by mouth in the morning      Cholecalciferol (Vitamin D3) 50 MCG (2000 UT) TABS Take 1 tablet by mouth daily      famotidine (PEPCID) 20 mg tablet Take 1 tablet (20 mg total) by mouth 2 (two) times a day as needed for heartburn 60 tablet 1    lisinopril (ZESTRIL) 20 mg tablet TAKE 1 TABLET BY MOUTH EVERY DAY 90 tablet 1    Magnesium Bisglycinate 100 MG TABS Take 400 mg by mouth in the morning      mometasone (ELOCON) 0.1 % cream Apply topically 2 (two) times a day as needed (Ear Eczema) 15 g 0    nebivolol (BYSTOLIC) 20 MG tablet TAKE 1 TABLET BY MOUTH EVERY DAY 90 tablet 1    Sodium Fluoride 5000 PPM 1.1 % GEL Take 1 Application by mouth daily at bedtime      triamcinolone (KENALOG) 0.1 % cream Apply 1 Application topically 2 (two) times a day as needed for rash       No current facility-administered medications for this visit.       Objective:  /68   Pulse 75   Temp 98.3 °F (36.8 °C)   Resp 16    "Ht 5' 1\" (1.549 m)   Wt 59.6 kg (131 lb 6.4 oz)   SpO2 99%   BMI 24.83 kg/m²    Wt Readings from Last 3 Encounters:   10/23/24 59.6 kg (131 lb 6.4 oz)   10/17/24 59.9 kg (132 lb)   10/02/24 59.9 kg (132 lb)      BP Readings from Last 3 Encounters:   10/23/24 148/68   10/17/24 158/93   10/05/24 163/92          Physical Exam  Vitals and nursing note reviewed.   Constitutional:       Appearance: Normal appearance. She is well-developed.   HENT:      Head: Normocephalic and atraumatic.   Eyes:      Conjunctiva/sclera: Conjunctivae normal.   Neck:      Thyroid: No thyromegaly.   Cardiovascular:      Rate and Rhythm: Normal rate and regular rhythm.      Pulses: Normal pulses.      Heart sounds: Normal heart sounds.   Pulmonary:      Effort: Pulmonary effort is normal.      Breath sounds: Normal breath sounds.   Abdominal:      General: Bowel sounds are normal. There is no distension.      Palpations: Abdomen is soft. There is no mass.      Tenderness: There is no abdominal tenderness. There is no right CVA tenderness, left CVA tenderness, guarding or rebound.   Musculoskeletal:         General: No swelling or tenderness.      Cervical back: Neck supple.      Right lower leg: No edema.      Left lower leg: No edema.   Lymphadenopathy:      Cervical: No cervical adenopathy.   Neurological:      General: No focal deficit present.      Mental Status: She is alert and oriented to person, place, and time.   Psychiatric:         Mood and Affect: Mood normal.         Lab Results:      Lab Results   Component Value Date    WBC 6.27 09/27/2024    HGB 12.6 09/27/2024    HCT 38.1 09/27/2024     09/27/2024    TRIG 81 09/27/2024    HDL 46 (L) 09/27/2024    LDLDIRECT 112 (H) 09/27/2024    ALT 12 09/27/2024    AST 17 09/27/2024    K 4.7 09/27/2024    CL 99 09/27/2024    CREATININE 0.87 09/27/2024    BUN 18 09/27/2024    CO2 29 09/27/2024    GLUF 120 (H) 09/27/2024    HGBA1C 5.6 09/27/2024     No results found for: " "\"URICACID\"  Invalid input(s): \"BASENAME\" Vitamin D    No results found.     POCT Labs                       "

## 2024-10-24 ENCOUNTER — TELEPHONE (OUTPATIENT)
Age: 79
End: 2024-10-24

## 2024-10-24 NOTE — TELEPHONE ENCOUNTER
New Patient     What is the reason for the patient's appointment?:   Dysuria  History of recurrent UTIs      Referral? Yes    What office location does the patient prefer?: [place]     Does patient have Imaging/Lab Results: No     Have patient records been requested?: No  If No, are the records showing in Epic: New to [place]     INSURANCE:   Do we accept the patient's insurance? Yes    Is the patient Self-Pay?: No     Insurance Provider: Medicare  Plan Type/Number:   Member ID#:      HISTORY:   Has the patient had any previous Urologist(s)?: no     Was the patient seen in the ED?: no     Has the patient had any outside testing done?: no     Does the patient have a personal history of cancer?: no

## 2024-10-24 NOTE — ASSESSMENT & PLAN NOTE
Controlled.  S/p recent Increase Lisinopril 20mg QD and increase Bystolic 20mg QD.  Continue to monitor on current regimen.  Pending Cardio consult.  Check blood pressure outside of office.  Recommend lifestyle modifications.

## 2024-10-27 DIAGNOSIS — K21.9 GASTROESOPHAGEAL REFLUX DISEASE, UNSPECIFIED WHETHER ESOPHAGITIS PRESENT: ICD-10-CM

## 2024-10-28 RX ORDER — FAMOTIDINE 20 MG/1
TABLET, FILM COATED ORAL
Qty: 180 TABLET | Refills: 1 | Status: SHIPPED | OUTPATIENT
Start: 2024-10-28

## 2024-11-06 ENCOUNTER — CONSULT (OUTPATIENT)
Dept: CARDIOLOGY CLINIC | Facility: CLINIC | Age: 79
End: 2024-11-06
Payer: MEDICARE

## 2024-11-06 VITALS
HEIGHT: 61 IN | OXYGEN SATURATION: 98 % | WEIGHT: 133 LBS | TEMPERATURE: 97.6 F | HEART RATE: 59 BPM | SYSTOLIC BLOOD PRESSURE: 162 MMHG | BODY MASS INDEX: 25.11 KG/M2 | DIASTOLIC BLOOD PRESSURE: 80 MMHG

## 2024-11-06 DIAGNOSIS — E78.49 OTHER HYPERLIPIDEMIA: ICD-10-CM

## 2024-11-06 DIAGNOSIS — I51.7 LEFT ATRIAL ENLARGEMENT: ICD-10-CM

## 2024-11-06 DIAGNOSIS — I34.0 NONRHEUMATIC MITRAL VALVE REGURGITATION: ICD-10-CM

## 2024-11-06 DIAGNOSIS — R07.89 CHEST PRESSURE: ICD-10-CM

## 2024-11-06 DIAGNOSIS — R06.02 SOB (SHORTNESS OF BREATH): ICD-10-CM

## 2024-11-06 DIAGNOSIS — I10 PRIMARY HYPERTENSION: ICD-10-CM

## 2024-11-06 DIAGNOSIS — I07.1 TRICUSPID VALVE INSUFFICIENCY, UNSPECIFIED ETIOLOGY: ICD-10-CM

## 2024-11-06 DIAGNOSIS — I05.8 MITRAL VALVE SCLEROSIS: ICD-10-CM

## 2024-11-06 DIAGNOSIS — I27.20 PULMONARY HYPERTENSION (HCC): Primary | ICD-10-CM

## 2024-11-06 PROCEDURE — 99204 OFFICE O/P NEW MOD 45 MIN: CPT | Performed by: INTERNAL MEDICINE

## 2024-11-06 NOTE — ASSESSMENT & PLAN NOTE
Mild to moderate on the last echocardiogram. Clinically, she is stable. I suspect this is secondary to diastolic dysfunction and her valve disease. Not needing any diuretics currently. Continue with blood pressure control with the Bystolic and lisinopril.

## 2024-11-06 NOTE — ASSESSMENT & PLAN NOTE
Moderate mitral regurgitation seen on last echocardiogram. This was in September 2024. Repeat 1 next year. Currently, volume status is stable. We discussed the importance of controlling her blood pressure with this. Continue Bystolic and lisinopril

## 2024-11-06 NOTE — PROGRESS NOTES
Cardiology Consultation     Chloé Anderson  [id number]  1945  [facility] [place]  [address] [address] [phone]    Orders Placed This Encounter   Procedures    Echo complete w/ contrast if indicated     Assessment & Plan  Nonrheumatic mitral valve regurgitation  Moderate mitral regurgitation seen on last echocardiogram. This was in September 2024. Repeat 1 next year. Currently, volume status is stable. We discussed the importance of controlling her blood pressure with this. Continue Bystolic and lisinopril  Pulmonary hypertension (HCC)  Mild to moderate on the last echocardiogram. Clinically, she is stable. I suspect this is secondary to diastolic dysfunction and her valve disease. Not needing any diuretics currently. Continue with blood pressure control with the Bystolic and lisinopril.  Primary hypertension  Recently, her blood pressure had been a little bit more labile. I suspect some of this was in the setting of relocating from [place] to [place] and not having her regular kitchen she was eating more takeout. She has now settled into her usual routine and has been on a higher dose of Bystolic and lisinopril. She is on 20 mg of each. Her blood pressure on average is controlled. Continue the current regimen. If blood pressure becomes elevated once again consistently, increase the lisinopril. She does have some asymptomatic sinus bradycardia so want to avoid further adjustment to the beta-blocker.      History of Present Illness:    79-year-old female comes to establish cardiac care here. She moved from [place] earlier this year.    Her son is in the area so she moved to be closer to him.    She was following with a cardiologist in [place] for hypertension, moderate mitral regurgitation and mild to moderate pulmonary hypertension on echocardiogram. Her blood pressure had been reasonably controlled  except earlier this year she did require titration of her antihypertensives and she is now on 20 mg of Bystolic and 20 mg of lisinopril. These were both increased.    She has never been on diuretics before. She has some hyponatremia had seen a nephrologist just before leaving [place]. She was advised fluid restriction which she is trying to adhere to.    When she was in transition between [place] and here, she was living out of a hotel in her son's house for a little bit. She values her independence and she is now in her own place in a [place] community and is doing better with cooking in her diet.    She was feeling some chest pressure when her pressure was high but after titration of her medical therapy, she is feeling much better. She is pretty active and independent and denies any symptoms or limitations.    She monitors her blood pressure at home periodically and it has been under control.    She had a stress test done in 2022 which was a exercise treadmill test and also had testing with a few echocardiograms down in [place] which had similar results to the 1 that was done here in September and also had a Holter monitor in September 2021.      Patient Active Problem List   Diagnosis    GERD (gastroesophageal reflux disease)    Hypertension    Anxiety    Eczema    Left atrial enlargement    Mitral valve sclerosis    Tricuspid regurgitation    Pulmonary hypertension (HCC)    Hyperlipidemia    Baker's cyst    Osteoarthritis    Osteoporosis    Grade II diastolic dysfunction    Aortic regurgitation    Mitral regurgitation    Stable branch retinal vein occlusion of left eye    Hypertensive retinopathy of both eyes    Posterior vitreous detachment of left eye    White without pressure of peripheral retina of both eyes    Vitreous debris    Vitreous syneresis of right eye    Hypomagnesemia    Hyponatremia     Past Medical History:   Diagnosis Date    Anxiety     Aortic regurgitation 09/06/2024    Baker's cyst     Eczema  2024    GERD (gastroesophageal reflux disease)     Grade II diastolic dysfunction 2024    Hyperlipidemia     Hypertension     Hypertensive retinopathy of both eyes 10/02/2024    Hypomagnesemia 10/02/2024    Hyponatremia 10/02/2024    Left atrial enlargement     Mitral regurgitation 2024    Mitral valve sclerosis     Osteoarthritis     Osteoporosis     Overweight 2024    Posterior vitreous detachment of left eye 10/02/2024    Pulmonary hypertension (HCC)     Stable branch retinal vein occlusion of left eye 10/02/2024    Tricuspid regurgitation 2024    Vitreous debris 10/02/2024    Vitreous syneresis of right eye 10/02/2024    White without pressure of peripheral retina of both eyes 10/02/2024     Social History     Tobacco Use    Smoking status: Never     Passive exposure: Never    Smokeless tobacco: Never    Tobacco comments:     Never smoked   Vaping Use    Vaping status: Never Used   Substance Use Topics    Alcohol use: Not Currently    Drug use: Never      Family History   Problem Relation Age of Onset    Heart failure Mother         passed at 90    Cataracts Mother     Anxiety disorder Mother     Alcohol abuse Father     Stroke Father     Heart attack Father         passed at 69    Heart disease Father     Cancer Sister         passed at 78    Anxiety disorder Sister     Obesity Brother         passed at 69     Past Surgical History:   Procedure Laterality Date    CATARACT EXTRACTION Bilateral 2010     SECTION  1972    EGD  2022,     LAPAROSCOPIC TOTAL HYSTERECTOMY  2016    Uterine Prolapse    STRABISMUS SURGERY Bilateral 1955       Current Outpatient Medications:     Calcium 500 MG tablet, Take 500 mg by mouth in the morning, Disp: , Rfl:     Cholecalciferol (Vitamin D3) 50 MCG (2000) TABS, Take 1 tablet by mouth daily, Disp: , Rfl:     lisinopril (ZESTRIL) 20 mg tablet, TAKE 1 TABLET BY MOUTH EVERY DAY, Disp: 90 tablet, Rfl: 1    Magnesium Bisglycinate  "100 MG TABS, Take 400 mg by mouth in the morning, Disp: , Rfl:     nebivolol (BYSTOLIC) 20 MG tablet, TAKE 1 TABLET BY MOUTH EVERY DAY, Disp: 90 tablet, Rfl: 1    Sodium Fluoride 5000 PPM 1.1 % GEL, Take 1 Application by mouth daily at bedtime, Disp: , Rfl:     triamcinolone (KENALOG) 0.1 % cream, Apply 1 Application topically 2 (two) times a day as needed for rash, Disp: , Rfl:     famotidine (PEPCID) 20 mg tablet, TAKE 1 TABLET BY MOUTH 2 TIMES A DAY AS NEEDED FOR HEARTBURN. (Patient not taking: Reported on 11/6/2024), Disp: 180 tablet, Rfl: 1    mometasone (ELOCON) 0.1 % cream, Apply topically 2 (two) times a day as needed (Ear Eczema) (Patient not taking: Reported on 11/6/2024), Disp: 15 g, Rfl: 0  Allergies   Allergen Reactions    Amlodipine Shortness Of Breath     Heart pressure    Telmisartan Shortness Of Breath     Heart pressure    Bactrim [Sulfamethoxazole-Trimethoprim] GI Intolerance    Codeine Nausea Only and Vomiting    Hydralazine Hives    Hydrocodone Other (See Comments)    Indapamide Other (See Comments)     Pt stated that her electrolyte went down    Losartan GI Intolerance    Oxycodone GI Intolerance and Vomiting    Lisinopril Cough       Vitals:    11/06/24 1057   BP: 162/80   BP Location: Left arm   Patient Position: Sitting   Cuff Size: Adult   Pulse: 59   Temp: 97.6 °F (36.4 °C)   TempSrc: Tympanic   SpO2: 98%   Weight: 60.3 kg (133 lb)   Height: 5' 1\" (1.549 m)     Vitals:    11/06/24 1057   Weight: 60.3 kg (133 lb)      Height: 5' 1\" (154.9 cm)   Body mass index is 25.13 kg/m².    Physical Exam:  GENERAL: Alert, well appearing, and in no distress  HEENT:  PERRL, EOMI, no scleral icterus, no conjunctival pallor  NECK:  Supple, No elevated JVP, no thyromegaly, no carotid bruits  HEART:  Regular rate and rhythm, normal S1/S2, no S3/S4, no murmur or rub  LUNGS:  Clear to auscultation bilaterally  ABDOMEN:  Soft, non-tender, positive bowel sounds, no rebound or guarding  EXTREMITIES:  No " "edema  VASCULAR:  Normal pedal pulses   NEURO: No focal deficits,  SKIN: Normal without suspicious lesions on exposed skin      ROS:  Except as noted in HPI, is otherwise reviewed in detail and a 12 point review of systems is negative.    Labs:  Lab Results   Component Value Date    SODIUM 132 (L) 09/27/2024    K 4.7 09/27/2024    CL 99 09/27/2024    CREATININE 0.87 09/27/2024    BUN 18 09/27/2024    CO2 29 09/27/2024    ALT 12 09/27/2024    AST 17 09/27/2024    GLUF 120 (H) 09/27/2024    HGBA1C 5.6 09/27/2024    WBC 6.27 09/27/2024    HGB 12.6 09/27/2024    HCT 38.1 09/27/2024     09/27/2024       No results found for: \"CHOL\"  Lab Results   Component Value Date    HDL 46 (L) 09/27/2024     Lab Results   Component Value Date    LDLCALC 93 09/27/2024     Lab Results   Component Value Date    TRIG 81 09/27/2024       Testing:  Echo 9/2024   Left Ventricle: Left ventricular cavity size is normal. Wall thickness is normal. The left ventricular ejection fraction is 55%. Systolic function is normal. Wall motion is normal. Diastolic function is moderately abnormal, consistent with grade II (pseudonormal) relaxation.    IVS: There is sigmoid appearance of the septum.    Left Atrium: The atrium is severely dilated (>48 mL/m2).    Aortic Valve: There is mild regurgitation.    Mitral Valve: There is mild thickening limited to the leaflet margin. The annulus is mildly dilated. There is moderate regurgitation with a centrally directed jet.    Tricuspid Valve: There is mild regurgitation. The right ventricular systolic pressure is mildly to moderately elevated. The estimated right ventricular systolic pressure is 45.00 mmHg.     EKG:  Sinus rhythm. LVH.  "

## 2024-11-06 NOTE — ASSESSMENT & PLAN NOTE
Recently, her blood pressure had been a little bit more labile. I suspect some of this was in the setting of relocating from [place] to [place] and not having her regular kitchen she was eating more takeout. She has now settled into her usual routine and has been on a higher dose of Bystolic and lisinopril. She is on 20 mg of each. Her blood pressure on average is controlled. Continue the current regimen. If blood pressure becomes elevated once again consistently, increase the lisinopril. She does have some asymptomatic sinus bradycardia so want to avoid further adjustment to the beta-blocker.

## 2024-11-07 ENCOUNTER — TELEPHONE (OUTPATIENT)
Dept: FAMILY MEDICINE CLINIC | Facility: CLINIC | Age: 79
End: 2024-11-07

## 2024-11-07 ENCOUNTER — EVALUATION (OUTPATIENT)
Dept: PHYSICAL THERAPY | Facility: CLINIC | Age: 79
End: 2024-11-07
Payer: MEDICARE

## 2024-11-07 DIAGNOSIS — M54.16 LUMBAR RADICULOPATHY: Primary | ICD-10-CM

## 2024-11-07 DIAGNOSIS — R29.898 MUSCULAR DECONDITIONING: ICD-10-CM

## 2024-11-07 DIAGNOSIS — Z91.81 AT RISK FOR FALLS: ICD-10-CM

## 2024-11-07 PROCEDURE — 97162 PT EVAL MOD COMPLEX 30 MIN: CPT

## 2024-11-07 PROCEDURE — 97530 THERAPEUTIC ACTIVITIES: CPT

## 2024-11-07 NOTE — PROGRESS NOTES
PT Evaluation     Today's date: 2024  Patient name: Chloé Anderson  : 1945  MRN: [id number]  Referring provider: Carline Howard DO  Dx:   Encounter Diagnosis     ICD-10-CM    1. Lumbar radiculopathy  M54.16       2. Muscular deconditioning  R29.898       3. At risk for falls  Z91.81                      Assessment  Impairments: abnormal muscle firing, abnormal or restricted ROM, activity intolerance, impaired physical strength, lacks appropriate home exercise program, pain with function, poor posture  and poor body mechanics    Assessment details: Chloé Anderson is a pleasant 79 y.o. female presents with signs and symptoms consistent with:   Balance  Lumbar Radiculopathy     Problem List:  1) Adverse neural tension  2) decreased functional outcomes     Comparable signs:  1) walking   2) strength     she has decreased strength, adverse neural tension, and decreased functional outcomes putting her at more risk of falls resulting in worry over not knowing what's wrong and fear of not being able to keep active. These impairments listed above are preventing the patient from participating in functional activity. No further referral appears necessary at this time based upon examination results, and is negative for any red flags. Prognosis is good given HEP compliance and attendance to physical therapy 2x a week.  Positive prognostic indicators include positive attitude toward recovery and good understanding of diagnosis and treatment plan options.  Negative prognostic indicators include none.  Patent will benefit from skilled physical therapy at this time to address deficits to improve overall function and return to PLOF. Patient verbalized understanding of POC, HEP, and return demonstrated HEP. All questions were answered to patients satisfaction.     Please contact me if you have any questions or recommendations. Thank you for the referral and the opportunity to share in Chloé Anderson's  care.            Understanding of Dx/Px/POC: good     Prognosis: good    Goals  Impairment Goals 4-6 weeks  In order to improve and maximize function patient will be able to...  - Improve overall strength in both LE and UE to 5/5  - Demonstrate McTSIB within age related norms  - Demonstrate at least 75% on ABC scale  - Centralize symptoms        Functional Goals 6-8 weeks  In order to improve and maximize function patient will be able to...  - Return to Prior Level of Function with no greater than 2/10 pain   - Increase Functional Status Measure (FOTO) to: >predicted outcomes  - Be independent and compliant with HEP   - Participate in ADL/IADLS without feeling unsteady or LOB  - Decrease TUG and 5xSTS to below 13 seconds to demonstrate less risk of falls by DC  - Report no instances of falls since beginning PT  - Ambulate on even and uneven surfaces safely without an AD.           Plan  Patient would benefit from: skilled physical therapy  Planned modality interventions: cryotherapy, thermotherapy: hydrocollator packs and TENS    Planned therapy interventions: home exercise program, graded exercise, functional ROM exercises, flexibility, body mechanics training, postural training, patient education, therapeutic activities, therapeutic exercise, manual therapy, joint mobilization, neuromuscular re-education, motor coordination training, graded activity, stretching and strengthening    Frequency: 2x week  Duration in weeks: 8  Treatment plan discussed with: patient and PCP        Subjective Evaluation    History of Present Illness  Mechanism of injury: Patient presents to PT today for balance. Patient just recently moved from [place] and is not used to inclines and strength. She was recently was on cipro and had tingling numbness and she has had that since September. She reports that any time she is movement and she reports numbness and tingling in groin and the front of her leg. She reports that pain continues to be  debilitating. She reports she used to walk a mile a day at [place]. She reports that pain is releived with hot showers, some massage feels better. She reports pain is relative     Difficulty with: walking, up and down stairs  Patient Goals  Patient goals for therapy: decreased pain, independence with ADLs/IADLs and return to sport/leisure activities  Patient goal: return to walking a mile  Pain  Current pain ratin  At worst pain ratin  Location: front of the hip (pain in the morning)  Quality: radiating, needle-like and sharp  Relieving factors: medications (tylenol)  Aggravating factors: walking and stair climbing      Diagnostic Tests  No diagnostic tests performed  Treatments  No previous or current treatments        Objective     Concurrent Complaints  Negative for night pain, disturbed sleep, bladder dysfunction, bowel dysfunction and saddle (S4) numbness    Active Range of Motion     Lumbar   Flexion:  Restriction level: moderate  Extension:  Restriction level: moderate  Left lateral flexion:  Restriction level: minimal  Right lateral flexion:  Restriction level: minimal  Left rotation:  Restriction level: minimal  Right rotation:  Restriction level: minimal    Joint Play     Hypomobile: L1, L2 and L3   Mechanical Assessment    Cervical      Thoracic      Lumbar    Standing flexion: repeated movements   Pain intensity: better  Pain level: decreased  Lying flexion: repeated movements  Pain intensity: better  Pain level: decreased  Standing extension: repeated movements  Pain location: no change  Pain intensity: better    Strength/Myotome Testing     Left Hip   Planes of Motion   Flexion: 4-  Extension: 4  Abduction: 4  External rotation: 4-  Internal rotation: 4    Right Hip   Planes of Motion   Flexion: 4-  Extension: 4  Abduction: 4  External rotation: 4-  Internal rotation: 4    Functional Assessment        Comments  5x STS:23s  TUs          General Comments:    Lower quarter screen   Hips:  unremarkable  Knees: unremarkable  Foot/ankle: unremarkable    Hip Comments   4/5 hip flexion               POC Expires Auth Status Start Date Expiration Date PT Visit Limit    12/7       Date 11/7       Used        Remaining           Diagnosis:  Balance/Deconditioning   Precautions:  See chart   Comparable signs 1) Walking  2) STS   Primary Impairments: 1) adverse neural tension  2) strength   Patient Goals Walk 1 mile   Manual Therapy 11/7                                            Re-evaluation          Exercise Diary          Therapeutic Exercise         Bike/Nu Step         Hip 3 ways          Forward flexion         LAQ/SAQ         Marches         Leg press                  Neuromuscular Re-education         Rowdy Mcguire board         EO/EC          Tandem walking                                             Therapeutic Activities         Education  POC, diagnosis, expecations        Side steps         STS                           Modalities

## 2024-11-07 NOTE — TELEPHONE ENCOUNTER
Pt dropped off DEXA results from 2023 and wanted confirmation that we received them. Tried to call pt, but pt had poor service while driving. Sent [employer] message letting her know.

## 2024-11-11 ENCOUNTER — OFFICE VISIT (OUTPATIENT)
Dept: PHYSICAL THERAPY | Facility: CLINIC | Age: 79
End: 2024-11-11
Payer: MEDICARE

## 2024-11-11 DIAGNOSIS — R29.898 MUSCULAR DECONDITIONING: ICD-10-CM

## 2024-11-11 DIAGNOSIS — M54.16 LUMBAR RADICULOPATHY: Primary | ICD-10-CM

## 2024-11-11 DIAGNOSIS — Z91.81 AT RISK FOR FALLS: ICD-10-CM

## 2024-11-11 PROCEDURE — 97530 THERAPEUTIC ACTIVITIES: CPT

## 2024-11-11 PROCEDURE — 97110 THERAPEUTIC EXERCISES: CPT

## 2024-11-11 NOTE — PROGRESS NOTES
Daily Note     Today's date: 2024  Patient name: Chloé Anderson  : 1945  MRN: [id number]  Referring provider: Carline Howard DO  Dx:   Encounter Diagnosis     ICD-10-CM    1. Lumbar radiculopathy  M54.16       2. Muscular deconditioning  R29.898       3. At risk for falls  Z91.81                      Subjective: Patient reports that she was able to decrease the pain for a little bit but didn't last.       Objective: See treatment diary below      Assessment: Tolerated treatment well. Patient demonstrated fatigue post treatment, exhibited good technique with therapeutic exercises, and would benefit from continued PT Patient responded well to flx/extension to decrease nerve sensitivity. She notes good tolerance to nerve glides to this date. Patient educated on nerve mobility, nerve sensitivity      Plan: Continue per plan of care.  Progress treatment as tolerated.         POC Expires Auth Status Start Date Expiration Date PT Visit Limit           Date        Used        Remaining           Diagnosis:  Balance/Deconditioning   Precautions:  See chart   Comparable signs 1) Walking  2) STS   Primary Impairments: 1) adverse neural tension  2) strength   Patient Goals Walk 1 mile   Manual Therapy                                            Re-evaluation          Exercise Diary          Therapeutic Exercise         Bike/Nu Step  5'        Hip 3 ways          Nerve glide   2x10       Hip sags/ PPU   On elbows 2x15/sags 2x15       SKC  20x       Forward flexion  2x10       LAQ/SAQ         Marches         Leg press                  Neuromuscular Re-education         Woandrew jules         Baps board         EO/EC          Tandem walking                                             Therapeutic Activities         Education  POC, diagnosis, expecations Nerve sensitivity nerve mobility        Side steps         STS                           Modalities

## 2024-11-13 ENCOUNTER — OFFICE VISIT (OUTPATIENT)
Dept: PHYSICAL THERAPY | Facility: CLINIC | Age: 79
End: 2024-11-13
Payer: MEDICARE

## 2024-11-13 DIAGNOSIS — Z91.81 AT RISK FOR FALLS: ICD-10-CM

## 2024-11-13 DIAGNOSIS — M54.16 LUMBAR RADICULOPATHY: Primary | ICD-10-CM

## 2024-11-13 DIAGNOSIS — R29.898 MUSCULAR DECONDITIONING: ICD-10-CM

## 2024-11-13 PROCEDURE — 97110 THERAPEUTIC EXERCISES: CPT

## 2024-11-13 PROCEDURE — 97112 NEUROMUSCULAR REEDUCATION: CPT

## 2024-11-13 NOTE — PROGRESS NOTES
"Daily Note     Today's date: 2024  Patient name: Chloé Anderson  : 1945  MRN: [id number]  Referring provider: Carline Howard DO  Dx:   Encounter Diagnosis     ICD-10-CM    1. Lumbar radiculopathy  M54.16       2. Muscular deconditioning  R29.898       3. At risk for falls  Z91.81             Start Time: 1215  Stop Time: 1300  Total time in clinic (min): 45 minutes    Subjective: Patient reports \"feeling so much better\" and notes decreased R LE pain/radicular sxs with added exercises last session, especially nerve glides and notes performing exercises while at home with good response.      Objective: See treatment diary below      Assessment: Tolerated treatment well. Continued with current POC focused on overall strength/conditioning, B LE stability and balance, and gait training to address functional deficits, with no adverse response demonstrated throughout session. Added tandem walking and standing hip abd/ext exercises to promote improvements in hip strength and balance/LE stability, with good tolerance demonstrated by patient. No increases in pain or radicular sxs post treatment. Continue to progress as able. Patient demonstrated fatigue post treatment, exhibited good technique with therapeutic exercises, and would benefit from continued PT to address R LE and balance/gait deficits in order to maximize overall functional ability.      Plan: Continue per plan of care.  Progress treatment as tolerated.         POC Expires Auth Status Start Date Expiration Date PT Visit Limit           Date      Used        Remaining           Diagnosis:  Balance/Deconditioning   Precautions:  See chart   Comparable signs 1) Walking  2) STS   Primary Impairments: 1) adverse neural tension  2) strength   Patient Goals Walk 1 mile   Manual Therapy                                           Re-evaluation          Exercise Diary          Therapeutic Exercise         Bike/Nu Step  5'  " "5'      Hip 3 ways    Abd/ext 10x ea      Nerve glide   2x10 2x10      Hip sags/ PPU   On elbows 2x15/sags 2x15 On elbows 2x15/sags 2x15      SKC  20x 20x      Forward flexion  2x10 2x10      LAQ/SAQ   3\"x10 B      Marches         Leg press                  Neuromuscular Re-education         Wobble boad         Baps board         EO/EC          Tandem walking   3 laps                                          Therapeutic Activities         Education  POC, diagnosis, expecations Nerve sensitivity nerve mobility        Side steps         STS                           Modalities                            "

## 2024-11-15 ENCOUNTER — TELEPHONE (OUTPATIENT)
Dept: CARDIOLOGY CLINIC | Facility: CLINIC | Age: 79
End: 2024-11-15

## 2024-11-15 ENCOUNTER — TELEPHONE (OUTPATIENT)
Dept: ADMINISTRATIVE | Facility: OTHER | Age: 79
End: 2024-11-15

## 2024-11-15 NOTE — TELEPHONE ENCOUNTER
----- Message from Misha GARCIA sent at 11/15/2024  8:56 AM EST -----  Regarding: Care Gap Request for DEXA  11/15/24 8:57 AM    Hello, our patient Chloé Anderson has had DEXA Scan completed/performed. Please assist in updating the patient chart by pulling the document from the Media Tab. The date of service is 06/05/2023.     Thank you,  Misha JOHNSTON

## 2024-11-15 NOTE — TELEPHONE ENCOUNTER
Upon review of the In Basket request we were able to locate, review, and update the patient chart as requested for DEXA Scan.    Any additional questions or concerns should be emailed to the Practice Liaisons via the appropriate education email address, please do not reply via In Basket.    Thank you  Kyle Cardona MA   [facility]

## 2024-11-15 NOTE — TELEPHONE ENCOUNTER
Called and s/w patient re: recent consultation with Dr. Merritt. Patient was very satisfied with visit, mentioning Dr. Merritt was very pleasant and articulate. Patient recently moved here from [place], and is happy to have him as a part of her care team.    No questions or concerns at this time.

## 2024-11-20 ENCOUNTER — OFFICE VISIT (OUTPATIENT)
Dept: PHYSICAL THERAPY | Facility: CLINIC | Age: 79
End: 2024-11-20
Payer: MEDICARE

## 2024-11-20 DIAGNOSIS — Z91.81 AT RISK FOR FALLS: ICD-10-CM

## 2024-11-20 DIAGNOSIS — M54.16 LUMBAR RADICULOPATHY: ICD-10-CM

## 2024-11-20 DIAGNOSIS — R29.898 MUSCULAR DECONDITIONING: Primary | ICD-10-CM

## 2024-11-20 PROCEDURE — 97112 NEUROMUSCULAR REEDUCATION: CPT

## 2024-11-20 PROCEDURE — 97110 THERAPEUTIC EXERCISES: CPT

## 2024-11-20 PROCEDURE — 97140 MANUAL THERAPY 1/> REGIONS: CPT

## 2024-11-20 NOTE — PROGRESS NOTES
"Daily Note     Today's date: 2024  Patient name: Chloé Anderson  : 1945  MRN: [id number]  Referring provider: Carline Howard DO  Dx:   Encounter Diagnosis     ICD-10-CM    1. Muscular deconditioning  R29.898       2. At risk for falls  Z91.81       3. Lumbar radiculopathy  M54.16                        Subjective: Patient reports that she is feeling better but she has some pinching in the front of her hip       Objective: See treatment diary below      Assessment: Tolerated treatment well. Patient responded well with hip distraction with mobilizations to address hip pinching. She responded well to addressing femoral nerve tension with reduction in symptoms afterward. Moderate verbal cueing needed for bridges . Patient demonstrated fatigue post treatment, exhibited good technique with therapeutic exercises, and would benefit from continued PT to address R LE and balance/gait deficits in order to maximize overall functional ability.      Plan: Continue per plan of care.  Progress treatment as tolerated.         POC Expires Auth Status Start Date Expiration Date PT Visit Limit           Date      Used        Remaining           Diagnosis:  Balance/Deconditioning   Precautions:  See chart   Comparable signs 1) Walking  2) STS   Primary Impairments: 1) adverse neural tension  2) strength   Patient Goals Walk 1 mile   Manual Therapy      Hip distraction     SP                                 Re-evaluation          Exercise Diary          Therapeutic Exercise         Bike/Nu Step  5'  5' 5'      Hip 3 ways    Abd/ext 10x ea      Nerve glide   2x10 2x10 Femoral 2x10     Hip fall outs     2x10     Ralph st    10x10\"     Hip sags/ PPU   On elbows 2x15/sags 2x15 On elbows 2x15/sags 2x15 On elbows 2x10     SKC  20x 20x      Forward flexion  2x10 2x10      LAQ/SAQ   3\"x10 B      Marches         Leg press                  Neuromuscular Re-education         Bridges     " 2x10     Wobble boad         Baps board         EO/EC          Tandem walking   3 laps                                          Therapeutic Activities         Education  POC, diagnosis, expecations Nerve sensitivity nerve mobility        Side steps         STS                           Modalities

## 2024-11-21 ENCOUNTER — CONSULT (OUTPATIENT)
Dept: ENDOCRINOLOGY | Facility: CLINIC | Age: 79
End: 2024-11-21
Payer: MEDICARE

## 2024-11-21 VITALS
WEIGHT: 133 LBS | HEART RATE: 70 BPM | OXYGEN SATURATION: 95 % | HEIGHT: 61 IN | DIASTOLIC BLOOD PRESSURE: 104 MMHG | BODY MASS INDEX: 25.11 KG/M2 | TEMPERATURE: 97.8 F | SYSTOLIC BLOOD PRESSURE: 182 MMHG

## 2024-11-21 DIAGNOSIS — M81.0 AGE-RELATED OSTEOPOROSIS WITHOUT CURRENT PATHOLOGICAL FRACTURE: Primary | ICD-10-CM

## 2024-11-21 DIAGNOSIS — E78.2 MIXED HYPERLIPIDEMIA: ICD-10-CM

## 2024-11-21 DIAGNOSIS — K21.9 GASTROESOPHAGEAL REFLUX DISEASE, UNSPECIFIED WHETHER ESOPHAGITIS PRESENT: ICD-10-CM

## 2024-11-21 PROCEDURE — 99204 OFFICE O/P NEW MOD 45 MIN: CPT | Performed by: INTERNAL MEDICINE

## 2024-11-21 NOTE — ASSESSMENT & PLAN NOTE
She has Hx of osteopenia with progression to osteoporosis.     Today we discussed all aspects of osteoporosis including pathophysiology, risk factors, complications, diet, calcium 1200mg/day, vitamin D supplementation to maintain goal >30ng/dL, lifestyle modifications, medical fitness training, goals of therapy, follow up needs and medications including Alendronate, zolendronate, denosumab, romosozumab, foreo and tymlos.    We agreed to check a DXA and bone profile based on which, we may consider medical therapy. She would be a good candidate for reclast. May avoid fosamax due to GERD.    Follow up in 3-6 months.

## 2024-11-21 NOTE — PROGRESS NOTES
"    New Consult Note      CC: osteoporosis    History of Present Illness:   79 yr female with Osteoporosis, GERD, dilated Lt [facility], HTN and vit D deficiency.    She does weight bearing exercise, weight training, takes calcium and vitamin D deficiency.    Physical Exam:  Body mass index is 25.13 kg/m².  BP (!) 182/104 (BP Location: Left arm, Patient Position: Sitting, Cuff Size: Standard)   Pulse 70   Temp 97.8 °F (36.6 °C) (Temporal)   Ht 5' 1\" (1.549 m)   Wt 60.3 kg (133 lb)   SpO2 95%   BMI 25.13 kg/m²    Vitals:    11/21/24 1121   Weight: 60.3 kg (133 lb)        Physical Exam  Constitutional:       General: She is not in acute distress.     Appearance: She is well-developed.   HENT:      Head: Normocephalic and atraumatic.      Nose: Nose normal.   Eyes:      Conjunctiva/sclera: Conjunctivae normal.   Pulmonary:      Effort: Pulmonary effort is normal.   Abdominal:      General: There is no distension.   Musculoskeletal:      Cervical back: Normal range of motion and neck supple.   Skin:     Findings: No rash.      Comments: No icterus   Neurological:      Mental Status: She is alert and oriented to person, place, and time.         Labs:   Lab Results   Component Value Date    HGBA1C 5.6 09/27/2024       Lab Results   Component Value Date    NZQ6NYHHVOPX 1.839 09/27/2024       Lab Results   Component Value Date    CREATININE 0.87 09/27/2024    CREATININE 0.89 09/10/2024    BUN 18 09/27/2024    K 4.7 09/27/2024    CL 99 09/27/2024    CO2 29 09/27/2024     eGFR   Date Value Ref Range Status   09/27/2024 62 ml/min/1.73sq m Final       Lab Results   Component Value Date    ALT 12 09/27/2024    AST 17 09/27/2024    ALKPHOS 60 09/27/2024       Lab Results   Component Value Date    CHOLESTEROL 155 09/27/2024     Lab Results   Component Value Date    HDL 46 (L) 09/27/2024     Lab Results   Component Value Date    TRIG 81 09/27/2024     Lab Results   Component Value Date    NONHDLC 109 09/27/2024 "         Assessment/Plan:    1. Age-related osteoporosis without current pathological fracture  Assessment & Plan:  She has Hx of osteopenia with progression to osteoporosis.     Today we discussed all aspects of osteoporosis including pathophysiology, risk factors, complications, diet, calcium 1200mg/day, vitamin D supplementation to maintain goal >30ng/dL, lifestyle modifications, medical fitness training, goals of therapy, follow up needs and medications including Alendronate, zolendronate, denosumab, romosozumab, foreo and tymlos.    We agreed to check a DXA and bone profile based on which, we may consider medical therapy. She would be a good candidate for reclast. May avoid fosamax due to GERD.    Follow up in 3-6 months.  Orders:  -     Ambulatory Referral to Endocrinology  -     Phosphorus; Future  -     Vitamin D 25 hydroxy; Future  -     PTH, intact; Future  -     Comprehensive metabolic panel; Future  -     DXA bone density spine hip and pelvis; Future; Expected date: 11/21/2024  2. Mixed hyperlipidemia  3. Gastroesophageal reflux disease, unspecified whether esophagitis present        I have spent a total time of 40 minutes on 11/21/24 in caring for this patient including greater than 50% of this time was spent in counseling/coordination of care as listed above.       Discussed with the patient and all questioned fully answered. She will contact me with concerns.    Michelle Buchanan

## 2024-11-22 ENCOUNTER — OFFICE VISIT (OUTPATIENT)
Dept: PHYSICAL THERAPY | Facility: CLINIC | Age: 79
End: 2024-11-22
Payer: MEDICARE

## 2024-11-22 DIAGNOSIS — M54.16 LUMBAR RADICULOPATHY: ICD-10-CM

## 2024-11-22 DIAGNOSIS — Z91.81 AT RISK FOR FALLS: ICD-10-CM

## 2024-11-22 DIAGNOSIS — R29.898 MUSCULAR DECONDITIONING: Primary | ICD-10-CM

## 2024-11-22 PROCEDURE — 97112 NEUROMUSCULAR REEDUCATION: CPT

## 2024-11-22 PROCEDURE — 97110 THERAPEUTIC EXERCISES: CPT

## 2024-11-22 PROCEDURE — 97140 MANUAL THERAPY 1/> REGIONS: CPT

## 2024-11-22 NOTE — PROGRESS NOTES
Daily Note     Today's date: 2024  Patient name: Chloé Anderson  : 1945  MRN: [id number]  Referring provider: Carline Hoawrd DO  Dx:   Encounter Diagnosis     ICD-10-CM    1. Muscular deconditioning  R29.898       2. At risk for falls  Z91.81       3. Lumbar radiculopathy  M54.16             Start Time: 1002  Stop Time: 1045  Total time in clinic (min): 43 minutes    Subjective: Patient reports improvements in lumbar/hip sxs since last session, but continues to note her biggest deficit is when trying to lift her leg up to get in and out of a car.      Objective: See treatment diary below      Assessment: Tolerated treatment well. Continued with current POC focused on lumbar/R LE stretching, strengthening, and stability exercises with no adverse response noted throughout session. Femoral nerve glides continue to provide patient with the most relief and reduction of sxs throughout performance of exercises today. Patient continued to respond favorably to manual hip joint mobilizations and distraction, noting improvements and relief in initially noted sxs post treatment. Continue to progress patient as able. Patient demonstrated fatigue post treatment, exhibited good technique with therapeutic exercises, and would benefit from continued PT to address lumbar and balance/gait deficits in order to maximize overall functional ability.      Plan: Continue per plan of care.  Progress treatment as tolerated.         POC Expires Auth Status Start Date Expiration Date PT Visit Limit           Date    Used        Remaining           Diagnosis:  Balance/Deconditioning   Precautions:  See chart   Comparable signs 1) Walking  2) STS   Primary Impairments: 1) adverse neural tension  2) strength   Patient Goals Walk 1 mile   Manual Therapy     Hip distraction     SP  SP                               Re-evaluation          Exercise Diary          Therapeutic  "Exercise         Bike/Nu Step  5'  5' 5'  5'    Hip 3 ways    Abd/ext 10x ea      Nerve glide   2x10 2x10 Femoral 2x10 Femoral 2x10    Hip fall outs     2x10 2x10    Ralph st    10x10\" 10x10\"    Hip sags/ PPU   On elbows 2x15/sags 2x15 On elbows 2x15/sags 2x15 On elbows 2x10     SKC  20x 20x      Forward flexion  2x10 2x10      LAQ/SAQ   3\"x10 B      Tab         Leg press                  Neuromuscular Re-education         Bridges     2x10 2x10    Wobble boad         Baps board         EO/EC          Tandem walking   3 laps                                          Therapeutic Activities         Education  POC, diagnosis, expecations Nerve sensitivity nerve mobility        Side steps         STS                           Modalities                            "

## 2024-11-25 ENCOUNTER — OFFICE VISIT (OUTPATIENT)
Dept: PHYSICAL THERAPY | Facility: CLINIC | Age: 79
End: 2024-11-25
Payer: MEDICARE

## 2024-11-25 DIAGNOSIS — R29.898 MUSCULAR DECONDITIONING: Primary | ICD-10-CM

## 2024-11-25 DIAGNOSIS — Z91.81 AT RISK FOR FALLS: ICD-10-CM

## 2024-11-25 DIAGNOSIS — M54.16 LUMBAR RADICULOPATHY: ICD-10-CM

## 2024-11-25 PROCEDURE — 97110 THERAPEUTIC EXERCISES: CPT

## 2024-11-25 PROCEDURE — 97112 NEUROMUSCULAR REEDUCATION: CPT

## 2024-11-25 NOTE — PROGRESS NOTES
"Daily Note     Today's date: 2024  Patient name: Chloé Anderson  : 1945  MRN: [id number]  Referring provider: Carline Howard DO  Dx:   Encounter Diagnosis     ICD-10-CM    1. Muscular deconditioning  R29.898       2. At risk for falls  Z91.81       3. Lumbar radiculopathy  M54.16               Start Time: 1045  Stop Time: 1130  Total time in clinic (min): 45 minutes    Subjective: Patient reports \"yesterday was the best day I've had in a while and did not wake up in pain for once\". Patient notes moving a lot of boxes on Saturday, and therefore assumed that she would have moderate soreness the next day, but was happily surprised that this wasn't the case.       Objective: See treatment diary below      Assessment: Tolerated treatment well. Continued with current POC focused on lumbar/R LE stretching, strengthening, and stability exercises with no adverse response noted throughout session. Patient continued to respond favorably to manual hip distraction, noting improvements and relief in initially noted sxs post treatment. Reintroduced standing hip 3 way exercise to promote improvements in hip mobility and strength in order to address deficits with getting in and out of a car, with good tolerance demonstrated by patient. Continue to progress patient as able. Patient demonstrated fatigue post treatment, exhibited good technique with therapeutic exercises, and would benefit from continued PT to address lumbar and balance/gait deficits in order to maximize overall functional ability.      Plan: Continue per plan of care.  Progress treatment as tolerated.         POC Expires Auth Status Start Date Expiration Date PT Visit Limit           Date    Used        Remaining           Diagnosis:  Balance/Deconditioning   Precautions:  See chart   Comparable signs 1) Walking  2) STS   Primary Impairments: 1) adverse neural tension  2) strength   Patient Goals Walk 1 mile   Manual " "Therapy 11/7 11/11 11/13 11/20 11/22 11/25   Hip distraction     SP  SP KR                              Re-evaluation          Exercise Diary          Therapeutic Exercise         Bike/Nu Step  5'  5' 5'  5' 5'   Hip 3 ways    Abd/ext 10x ea   Abd/ext/march 2x10 ea B   Nerve glide   2x10 2x10 Femoral 2x10 Femoral 2x10 Femoral 2x10   Hip fall outs     2x10 2x10    Ralph st    10x10\" 10x10\" 10x10\"   Hip sags/ PPU   On elbows 2x15/sags 2x15 On elbows 2x15/sags 2x15 On elbows 2x10  On elbows 2x10   SKC  20x 20x      Forward flexion  2x10 2x10      LAQ/SAQ   3\"x10 B      Marches         Leg press                  Neuromuscular Re-education         Bridges     2x10 2x10 2x10   Wobble boad         Baps board         EO/EC          Tandem walking   3 laps                                          Therapeutic Activities         Education  POC, diagnosis, expecations Nerve sensitivity nerve mobility        Side steps         STS                           Modalities                            "

## 2024-11-27 ENCOUNTER — APPOINTMENT (OUTPATIENT)
Dept: PHYSICAL THERAPY | Facility: CLINIC | Age: 79
End: 2024-11-27
Payer: MEDICARE

## 2024-11-29 ENCOUNTER — APPOINTMENT (OUTPATIENT)
Dept: PHYSICAL THERAPY | Facility: CLINIC | Age: 79
End: 2024-11-29
Payer: MEDICARE

## 2024-12-03 ENCOUNTER — OFFICE VISIT (OUTPATIENT)
Dept: URGENT CARE | Facility: CLINIC | Age: 79
End: 2024-12-03
Payer: MEDICARE

## 2024-12-03 ENCOUNTER — OFFICE VISIT (OUTPATIENT)
Dept: PHYSICAL THERAPY | Facility: CLINIC | Age: 79
End: 2024-12-03
Payer: MEDICARE

## 2024-12-03 VITALS
RESPIRATION RATE: 14 BRPM | DIASTOLIC BLOOD PRESSURE: 98 MMHG | OXYGEN SATURATION: 96 % | HEART RATE: 67 BPM | SYSTOLIC BLOOD PRESSURE: 198 MMHG | BODY MASS INDEX: 25.11 KG/M2 | WEIGHT: 133 LBS | HEIGHT: 61 IN | TEMPERATURE: 97.7 F

## 2024-12-03 DIAGNOSIS — M54.16 LUMBAR RADICULOPATHY: ICD-10-CM

## 2024-12-03 DIAGNOSIS — R30.0 DYSURIA: Primary | ICD-10-CM

## 2024-12-03 DIAGNOSIS — R29.898 MUSCULAR DECONDITIONING: Primary | ICD-10-CM

## 2024-12-03 DIAGNOSIS — Z91.81 AT RISK FOR FALLS: ICD-10-CM

## 2024-12-03 DIAGNOSIS — N30.01 ACUTE CYSTITIS WITH HEMATURIA: ICD-10-CM

## 2024-12-03 LAB
SL AMB  POCT GLUCOSE, UA: ABNORMAL
SL AMB LEUKOCYTE ESTERASE,UA: ABNORMAL
SL AMB POCT BILIRUBIN,UA: ABNORMAL
SL AMB POCT BLOOD,UA: ABNORMAL
SL AMB POCT CLARITY,UA: ABNORMAL
SL AMB POCT COLOR,UA: YELLOW
SL AMB POCT KETONES,UA: ABNORMAL
SL AMB POCT NITRITE,UA: ABNORMAL
SL AMB POCT PH,UA: 7.5
SL AMB POCT SPECIFIC GRAVITY,UA: 1
SL AMB POCT URINE PROTEIN: 100
SL AMB POCT UROBILINOGEN: 0.2

## 2024-12-03 PROCEDURE — G0463 HOSPITAL OUTPT CLINIC VISIT: HCPCS | Performed by: FAMILY MEDICINE

## 2024-12-03 PROCEDURE — 87077 CULTURE AEROBIC IDENTIFY: CPT | Performed by: FAMILY MEDICINE

## 2024-12-03 PROCEDURE — 87186 SC STD MICRODIL/AGAR DIL: CPT | Performed by: FAMILY MEDICINE

## 2024-12-03 PROCEDURE — 97140 MANUAL THERAPY 1/> REGIONS: CPT

## 2024-12-03 PROCEDURE — 97112 NEUROMUSCULAR REEDUCATION: CPT

## 2024-12-03 PROCEDURE — 87086 URINE CULTURE/COLONY COUNT: CPT | Performed by: FAMILY MEDICINE

## 2024-12-03 PROCEDURE — 97110 THERAPEUTIC EXERCISES: CPT

## 2024-12-03 PROCEDURE — 81002 URINALYSIS NONAUTO W/O SCOPE: CPT | Performed by: FAMILY MEDICINE

## 2024-12-03 PROCEDURE — 99213 OFFICE O/P EST LOW 20 MIN: CPT | Performed by: FAMILY MEDICINE

## 2024-12-03 RX ORDER — CEPHALEXIN 500 MG/1
500 CAPSULE ORAL EVERY 6 HOURS SCHEDULED
Qty: 20 CAPSULE | Refills: 0 | Status: SHIPPED | OUTPATIENT
Start: 2024-12-03 | End: 2024-12-08

## 2024-12-03 NOTE — PROGRESS NOTES
Daily Note     Today's date: 12/3/2024  Patient name: Chloé Anderson  : 1945  MRN: [id number]  Referring provider: Carline Howard DO  Dx:   Encounter Diagnosis     ICD-10-CM    1. Muscular deconditioning  R29.898       2. At risk for falls  Z91.81       3. Lumbar radiculopathy  M54.16           Start Time: 1000  Stop Time: 1040  Total time in clinic (min): 40 minutes      Subjective: Patient reports significant improvements with her ability to get in and out of the car and was able to complete this task for the first time over the weekend, without needing to pull her pant leg with her UE.       Objective: See treatment diary below      Assessment: Tolerated treatment well. Continued with current POC focused on lumbar/R LE stretching, strengthening, and stability exercises with no adverse response noted throughout session. Good response to manual hip distraction at the beginning of session, noting improvements and relief in initially noted sxs post treatment. No increases in sxs post treatment. Continue to progress patient as able. Patient demonstrated fatigue post treatment, exhibited good technique with therapeutic exercises, and would benefit from continued PT to address lumbar and balance/gait deficits in order to maximize overall functional ability.      Plan: Continue per plan of care.  Progress treatment as tolerated.         POC Expires Auth Status Start Date Expiration Date PT Visit Limit           Date 11/25 12/3 11/13 11/20 11/22   Used        Remaining           Diagnosis:  Balance/Deconditioning   Precautions:  See chart   Comparable signs 1) Walking  2) STS   Primary Impairments: 1) adverse neural tension  2) strength   Patient Goals Walk 1 mile   Manual Therapy 12/3 11/11 11/13 11/20 11/22 11/25   Hip distraction  KR   SP  SP KR                              Re-evaluation          Exercise Diary          Therapeutic Exercise         Bike/Nu Step 5' 5'  5' 5'  5' 5'   Hip 3 ways   "Abd/ext/march 2x10 ea B  Abd/ext 10x ea   Abd/ext/march 2x10 ea B   Nerve glide  2x10 2x10 2x10 Femoral 2x10 Femoral 2x10 Femoral 2x10   Hip fall outs     2x10 2x10    Ralph st 10\"x10   10x10\" 10x10\" 10x10\"   Hip sags/ PPU  On elbows 2x10 On elbows 2x15/sags 2x15 On elbows 2x15/sags 2x15 On elbows 2x10  On elbows 2x10   SKC  20x 20x      Forward flexion  2x10 2x10      LAQ/SAQ   3\"x10 B      Marches         Leg press                  Neuromuscular Re-education         Bridges  2x10   2x10 2x10 2x10   Wobble boad         Baps board         EO/EC          Tandem walking   3 laps                                          Therapeutic Activities         Education  POC, diagnosis, expecations Nerve sensitivity nerve mobility        Side steps         STS                           Modalities                            "

## 2024-12-03 NOTE — PROGRESS NOTES
[facility]        NAME: Chloé Anderson is a 79 y.o. female  : 1945    MRN: [id number]  DATE: December 3, 2024  TIME: 1:06 PM    Assessment and Plan   Dysuria [R30.0]  1. Dysuria  POCT urine dip    Urine culture      2. Acute cystitis with hematuria  cephalexin (KEFLEX) 500 mg capsule        UTI based on UA  Treated with antibiotics as above.  Will await C/S and change medication if needed.  Supportive care measures discussed  ED precautions discussed    Repeat BP decreased to 175/95  Follow up with PCP  Keep appt with urology      Patient Instructions       Follow up with PCP in 3-5 days.  Proceed to  ER if symptoms worsen.    If tests have been performed at [facility], our office will contact you with results if changes need to be made to the care plan discussed with you at the visit.  You can review your full results on [facility] MyChart.    Chief Complaint     Chief Complaint   Patient presents with    Possible UTI     URI s/s with history of frequent uti.         History of Present Illness       80 yo with a history of recurrent UTIs with low CFU but significant symptoms. Moved here a few months ago and has an appt with a new urologist in February.  She has had the current symptoms for about 12 days.    Urinary Tract Infection   This is a recurrent problem. The current episode started 1 to 4 weeks ago. The problem occurs every urination. The problem has been unchanged. The quality of the pain is described as burning and shooting. The pain is severe. There has been no fever. Associated symptoms include chills, flank pain, frequency, hematuria, hesitancy and urgency. Pertinent negatives include no discharge, nausea, possible pregnancy, sweats or vomiting. She has tried increased fluids for the symptoms. The treatment provided no relief. Her past medical history is significant for recurrent UTIs. There is no history of catheterization, kidney stones, a single kidney, urinary stasis or a urological  procedure.       Review of Systems   Review of Systems   Constitutional:  Positive for chills.   Gastrointestinal:  Negative for nausea and vomiting.   Genitourinary:  Positive for flank pain, frequency, hematuria, hesitancy and urgency.         Current Medications       Current Outpatient Medications:     cephalexin (KEFLEX) 500 mg capsule, Take 1 capsule (500 mg total) by mouth every 6 (six) hours for 5 days, Disp: 20 capsule, Rfl: 0    Calcium 500 MG tablet, Take 500 mg by mouth in the morning, Disp: , Rfl:     Cholecalciferol (Vitamin D3) 50 MCG (2000 UT) TABS, Take 1 tablet by mouth daily, Disp: , Rfl:     famotidine (PEPCID) 20 mg tablet, TAKE 1 TABLET BY MOUTH 2 TIMES A DAY AS NEEDED FOR HEARTBURN. (Patient not taking: Reported on 11/21/2024), Disp: 180 tablet, Rfl: 1    lisinopril (ZESTRIL) 20 mg tablet, TAKE 1 TABLET BY MOUTH EVERY DAY, Disp: 90 tablet, Rfl: 1    Magnesium Bisglycinate 100 MG TABS, Take 400 mg by mouth in the morning, Disp: , Rfl:     mometasone (ELOCON) 0.1 % cream, Apply topically 2 (two) times a day as needed (Ear Eczema) (Patient not taking: Reported on 11/6/2024), Disp: 15 g, Rfl: 0    nebivolol (BYSTOLIC) 20 MG tablet, TAKE 1 TABLET BY MOUTH EVERY DAY, Disp: 90 tablet, Rfl: 1    Sodium Fluoride 5000 PPM 1.1 % GEL, Take 1 Application by mouth daily at bedtime, Disp: , Rfl:     triamcinolone (KENALOG) 0.1 % cream, Apply 1 Application topically 2 (two) times a day as needed for rash, Disp: , Rfl:     Current Allergies     Allergies as of 12/03/2024 - Reviewed 12/03/2024   Allergen Reaction Noted    Amlodipine Shortness Of Breath 08/12/2024    Telmisartan Shortness Of Breath 08/12/2024    Bactrim [sulfamethoxazole-trimethoprim] GI Intolerance 08/12/2024    Codeine Nausea Only and Vomiting 09/24/2024    Hydralazine Hives 08/12/2024    Hydrocodone Other (See Comments) 09/24/2024    Indapamide Other (See Comments) 08/12/2024    Losartan GI Intolerance 08/12/2024    Oxycodone GI Intolerance  "and Vomiting 2024    Lisinopril Cough 2024            The following portions of the patient's history were reviewed and updated as appropriate: allergies, current medications, past family history, past medical history, past social history, past surgical history and problem list.     Past Medical History:   Diagnosis Date    Anxiety     Aortic regurgitation 2024    Baker's cyst     Eczema 2024    GERD (gastroesophageal reflux disease)     Grade II diastolic dysfunction 2024    Hyperlipidemia     Hypertension     Hypertensive retinopathy of both eyes 10/02/2024    Hypomagnesemia 10/02/2024    Hyponatremia 10/02/2024    Left atrial enlargement     Mitral regurgitation 2024    Mitral valve sclerosis     Osteoarthritis     Osteoporosis     Overweight 2024    Posterior vitreous detachment of left eye 10/02/2024    Pulmonary hypertension (HCC)     Stable branch retinal vein occlusion of left eye 10/02/2024    Tricuspid regurgitation 2024    Vitreous debris 10/02/2024    Vitreous syneresis of right eye 10/02/2024    White without pressure of peripheral retina of both eyes 10/02/2024       Past Surgical History:   Procedure Laterality Date    CATARACT EXTRACTION Bilateral 2010     SECTION  1972    EGD  2022    LAPAROSCOPIC TOTAL HYSTERECTOMY  2016    Uterine Prolapse    STRABISMUS SURGERY Bilateral 1955       Family History   Problem Relation Age of Onset    Heart failure Mother         passed at 90    Cataracts Mother     Anxiety disorder Mother     Alcohol abuse Father     Stroke Father     Heart attack Father         passed at 69    Heart disease Father     Cancer Sister         passed at 78    Anxiety disorder Sister     Obesity Brother         passed at 69         Medications have been verified.        Objective   BP (!) 198/98   Pulse 67   Temp 97.7 °F (36.5 °C)   Resp 14   Ht 5' 1\" (1.549 m)   Wt 60.3 kg (133 lb)   SpO2 96%   BMI 25.13 " kg/m²   No LMP recorded.       Physical Exam     Physical Exam  Vitals reviewed.   Constitutional:       General: She is not in acute distress.     Appearance: Normal appearance. She is not ill-appearing, toxic-appearing or diaphoretic.   HENT:      Head: Normocephalic and atraumatic.      Right Ear: Tympanic membrane normal.      Left Ear: Tympanic membrane normal.      Nose: Nose normal.      Mouth/Throat:      Mouth: Mucous membranes are moist.      Pharynx: No oropharyngeal exudate or posterior oropharyngeal erythema.   Eyes:      Pupils: Pupils are equal, round, and reactive to light.   Cardiovascular:      Rate and Rhythm: Normal rate and regular rhythm.      Heart sounds: No murmur heard.  Pulmonary:      Effort: Pulmonary effort is normal. No respiratory distress.      Breath sounds: Normal breath sounds.   Abdominal:      General: Abdomen is flat.      Palpations: Abdomen is soft.      Tenderness: There is no abdominal tenderness. There is no right CVA tenderness or left CVA tenderness.   Musculoskeletal:         General: Normal range of motion.      Cervical back: Normal range of motion. No rigidity or tenderness.   Lymphadenopathy:      Cervical: No cervical adenopathy.   Skin:     General: Skin is warm and dry.      Capillary Refill: Capillary refill takes less than 2 seconds.   Neurological:      General: No focal deficit present.      Mental Status: She is alert and oriented to person, place, and time. Mental status is at baseline.   Psychiatric:         Mood and Affect: Mood normal.         Behavior: Behavior normal.         Thought Content: Thought content normal.

## 2024-12-05 ENCOUNTER — EVALUATION (OUTPATIENT)
Dept: PHYSICAL THERAPY | Facility: CLINIC | Age: 79
End: 2024-12-05
Payer: MEDICARE

## 2024-12-05 DIAGNOSIS — R29.898 MUSCULAR DECONDITIONING: Primary | ICD-10-CM

## 2024-12-05 DIAGNOSIS — M54.16 LUMBAR RADICULOPATHY: ICD-10-CM

## 2024-12-05 DIAGNOSIS — Z91.81 AT RISK FOR FALLS: ICD-10-CM

## 2024-12-05 PROCEDURE — 97140 MANUAL THERAPY 1/> REGIONS: CPT

## 2024-12-05 PROCEDURE — 97110 THERAPEUTIC EXERCISES: CPT

## 2024-12-05 PROCEDURE — 97112 NEUROMUSCULAR REEDUCATION: CPT

## 2024-12-05 NOTE — PROGRESS NOTES
PT Evaluation     Today's date: 2024  Patient name: Chloé Anderson  : 1945  MRN: [id number]  Referring provider: Carline Howard DO  Dx:   Encounter Diagnosis     ICD-10-CM    1. Muscular deconditioning  R29.898       2. Lumbar radiculopathy  M54.16       3. At risk for falls  Z91.81                      Assessment  Impairments: abnormal muscle firing, abnormal or restricted ROM, activity intolerance, impaired physical strength, lacks appropriate home exercise program, pain with function, poor posture  and poor body mechanics    Assessment details: RE today/date: 2024 Patient presents for re-evaluation after participating in physical therapy. At this point they have been compliant with HEP and PT to this date and has made progress with ROM and strength and overall pain to this date. She has reduced pain in her leg to this date which has improved with overall function . At this point they are still lacking strength, endurance and increased pain in her hip with some remaining neural tension. They will continue to benefit from skilled PT to continue to address remaining impairments and progress toward optimal function and improve quality of life.     IE:Chloé Anderson is a pleasant 79 y.o. female presents with signs and symptoms consistent with:   Balance  Lumbar Radiculopathy     Problem List:  1) Adverse neural tension  2) decreased functional outcomes     Comparable signs:  1) walking   2) strength     she has decreased strength, adverse neural tension, and decreased functional outcomes putting her at more risk of falls resulting in worry over not knowing what's wrong and fear of not being able to keep active. These impairments listed above are preventing the patient from participating in functional activity. No further referral appears necessary at this time based upon examination results, and is negative for any red flags. Prognosis is good given HEP compliance and attendance to physical therapy 2x  a week.  Positive prognostic indicators include positive attitude toward recovery and good understanding of diagnosis and treatment plan options.  Negative prognostic indicators include none.  Patent will benefit from skilled physical therapy at this time to address deficits to improve overall function and return to PLOF. Patient verbalized understanding of POC, HEP, and return demonstrated HEP. All questions were answered to patients satisfaction.     Please contact me if you have any questions or recommendations. Thank you for the referral and the opportunity to share in Chloé Anderson's care.            Understanding of Dx/Px/POC: good     Prognosis: good    Goals  Impairment Goals 4-6 weeks Progressing 12/5  In order to improve and maximize function patient will be able to...  - Improve overall strength in both LE and UE to 5/5  - Demonstrate McTSIB within age related norms  - Demonstrate at least 75% on ABC scale  - Centralize symptoms        Functional Goals 6-8 weeks Progressing 12/5  In order to improve and maximize function patient will be able to...  - Return to Prior Level of Function with no greater than 2/10 pain   - Increase Functional Status Measure (FOTO) to: >predicted outcomes  - Be independent and compliant with HEP   - Participate in ADL/IADLS without feeling unsteady or LOB  - Decrease TUG and 5xSTS to below 13 seconds to demonstrate less risk of falls by DC  - Report no instances of falls since beginning PT  - Ambulate on even and uneven surfaces safely without an AD.           Plan  Patient would benefit from: skilled physical therapy  Planned modality interventions: cryotherapy, thermotherapy: hydrocollator packs and TENS    Planned therapy interventions: home exercise program, graded exercise, functional ROM exercises, flexibility, body mechanics training, postural training, patient education, therapeutic activities, therapeutic exercise, manual therapy, joint mobilization, neuromuscular  re-education, motor coordination training, graded activity, stretching and strengthening    Frequency: 2x week  Duration in weeks: 8  Treatment plan discussed with: patient and PCP        Subjective Evaluation    History of Present Illness  Mechanism of injury: RE 24 Patient presents to PT today for leg pain and balance with 90-95% improved. She demonstrates improvement with her neuropathy and leg pain overall. She reports the leg pain has decreased and no longer has the leg pain. She reports that her leg pain has been better and able to have more mobility. She reports that she feels that her endurance is still decreased. She is not up to a mile yet but overall walking is better she is able to go up and down the stairs and not relying on the cane.     IE:Patient presents to PT today for balance. Patient just recently moved from [place] and is not used to inclines and strength. She was recently was on cipro and had tingling numbness and she has had that since September. She reports that any time she is movement and she reports numbness and tingling in groin and the front of her leg. She reports that pain continues to be debilitating. She reports she used to walk a mile a day at [place]. She reports that pain is releived with hot showers, some massage feels better. She reports pain is relative     Difficulty with: walking, up and down stairs  Patient Goals  Patient goals for therapy: decreased pain, independence with ADLs/IADLs and return to sport/leisure activities  Patient goal: return to walking a mile (making progress)  Pain  Current pain ratin  At worst pain ratin  Location: front of the hip (pain in the morning)  Quality: radiating, needle-like and sharp  Relieving factors: medications (tylenol)  Aggravating factors: walking and stair climbing      Diagnostic Tests  No diagnostic tests performed  Treatments  No previous or current treatments        Objective     Concurrent Complaints  Negative for  "night pain, disturbed sleep, bladder dysfunction, bowel dysfunction and saddle (S4) numbness    Active Range of Motion     Lumbar   Flexion:  Restriction level: minimal  Extension:  Restriction level: minimal    Joint Play   Joints within functional limits: L1, L2 and L3   Mechanical Assessment    Cervical      Thoracic      Lumbar    Standing flexion: repeated movements   Pain intensity: better  Pain level: decreased  Lying flexion: repeated movements  Pain intensity: better  Pain level: decreased  Standing extension: repeated movements  Pain location: no change  Pain intensity: better    Strength/Myotome Testing     Left Hip   Planes of Motion   Flexion: 4  Extension: 4+  Abduction: 4  External rotation: 4  Internal rotation: 4+    Isolated Muscles   Gluteus medius: 4-    Right Hip   Planes of Motion   Flexion: 4  Extension: 4+  Abduction: 4  External rotation: 4  Internal rotation: 4+    Isolated Muscles   Gluteus medius: 4-    Functional Assessment        Comments  5x STS:15s w/ arms   TUs  30s STS 9x w/ arms           General Comments:    Lower quarter screen   Hips: unremarkable  Knees: unremarkable  Foot/ankle: unremarkable               POC Expires Auth Status Start Date Expiration Date PT Visit Limit           Date 11/25 12/3 11/13 11/20 11/22   Used        Remaining           Diagnosis:  Balance/Deconditioning   Precautions:  See chart   Comparable signs 1) Walking  2) STS   Primary Impairments: 1) adverse neural tension  2) strength   Patient Goals Walk 1 mile   Manual Therapy 12/3 12/5 11/13 11/20 11/22 11/25   Hip distraction  KR AK   SP  SP KR                              Re-evaluation   SP       Exercise Diary          Therapeutic Exercise         Bike/Nu Step 5' 5'  5' 5'  5' 5'   Hip 3 ways  Abd/ext/march 2x10 ea B  Abd/ext 10x ea   Abd/ext/march 2x10 ea B   Nerve glide  2x10  2x10 Femoral 2x10 Femoral 2x10 Femoral 2x10   Hip fall outs     2x10 2x10    [address] 10\"x10 10x10\"  10x10\" 10x10\" " "10x10\"   Hip sags/ PPU  On elbows 2x10 2x10 standing  On elbows 2x15/sags 2x15 On elbows 2x10  On elbows 2x10   SKC   20x      Forward flexion   2x10      LAQ/SAQ   3\"x10 B      Marches         Leg press                  Neuromuscular Re-education         Bridges  2x10   2x10 2x10 2x10   Wobble boad         Baps board         EO/EC          Tandem walking   3 laps                                          Therapeutic Activities         Education  POC, diagnosis, expecations        Side steps         STS                           Modalities                            "

## 2024-12-06 LAB — BACTERIA UR CULT: ABNORMAL

## 2024-12-10 ENCOUNTER — OFFICE VISIT (OUTPATIENT)
Dept: PHYSICAL THERAPY | Facility: CLINIC | Age: 79
End: 2024-12-10
Payer: MEDICARE

## 2024-12-10 DIAGNOSIS — M54.16 LUMBAR RADICULOPATHY: ICD-10-CM

## 2024-12-10 DIAGNOSIS — R29.898 MUSCULAR DECONDITIONING: Primary | ICD-10-CM

## 2024-12-10 DIAGNOSIS — Z91.81 AT RISK FOR FALLS: ICD-10-CM

## 2024-12-10 PROCEDURE — 97110 THERAPEUTIC EXERCISES: CPT

## 2024-12-10 PROCEDURE — 97530 THERAPEUTIC ACTIVITIES: CPT

## 2024-12-10 PROCEDURE — 97140 MANUAL THERAPY 1/> REGIONS: CPT

## 2024-12-10 PROCEDURE — 97112 NEUROMUSCULAR REEDUCATION: CPT

## 2024-12-10 NOTE — PROGRESS NOTES
Daily Note     Today's date: 12/10/2024  Patient name: Chloé Anderson  : 1945  MRN: [id number]  Referring provider: Carline Howard DO  Dx:   Encounter Diagnosis     ICD-10-CM    1. Muscular deconditioning  R29.898       2. Lumbar radiculopathy  M54.16       3. At risk for falls  Z91.81           Start Time: 1000  Stop Time: 1053  Total time in clinic (min): 53 minutes    Subjective: Patient reports that she has not had much pain the past couple of days and has been noticing significant improvements with each and every day.       Objective: See treatment diary below      Assessment: Tolerated treatment well. Progressed patient throughout session by focusing on B LE/lumbar strengthening, stability/balance, and functional exercises, with no adverse response or increases in sxs demonstrated throughout session, despite increases in muscular fatigue. Patient displayed a steady/stable gait during tandem walking today, therefore added foam beams during exercise to challenge patient with ambulating on uneven surfaces, with no significant episodes of LOB throughout. Leg press was challenging initially for patient, but improved throughout duration and with VC to eccentrically lower herself on descent. Good tolerance and relief noted post manual distraction. Continue to progress patient as able. Patient would benefit from continued PT      Plan: Continue per plan of care.          POC Expires Auth Status Start Date Expiration Date PT Visit Limit           Date 11/25 12/3 12/5 12/10 11/22   Used        Remaining           Diagnosis:  Balance/Deconditioning   Precautions:  See chart   Comparable signs 1) Walking  2) STS   Primary Impairments: 1) adverse neural tension  2) strength   Patient Goals Walk 1 mile   Manual Therapy 12/3 12/5 12/10 11/20 11/22 11/25   Hip distraction  KR AK  KR SP  SP KR                              Re-evaluation   SP       Exercise Diary          Therapeutic Exercise         Bike/Nu Step  "5' 5'  5' 5'  5' 5'   Hip 3 ways  Abd/ext/march 2x10 ea B  Abd/ext/flex 2x10 ea 1#   Abd/ext/march 2x10 ea B   Nerve glide  2x10   Femoral 2x10 Femoral 2x10 Femoral 2x10   Hip fall outs     2x10 2x10    [address] 10\"x10 10x10\"  10x10\" 10x10\" 10x10\"   Hip sags/ PPU  On elbows 2x10 2x10 standing   On elbows 2x10  On elbows 2x10   SKC         Forward flexion         LAQ/SAQ         Marches   1# 2x10 ea      Leg press   Seat 3   30# 1x10  35# 1x10  40# 1x10               Neuromuscular Re-education         Bridges  2x10   2x10 2x10 2x10   Wobble board         Baps board         EO/EC          Tandem walking   W/ foam beams x 2 laps                                          Therapeutic Activities         Education  POC, diagnosis, expecations        Side steps         STS   2x10 w/ UE assist      Step ups   Fwd   4\" 1x10  6\" 2x10               Modalities                            "

## 2024-12-13 ENCOUNTER — OFFICE VISIT (OUTPATIENT)
Dept: PHYSICAL THERAPY | Facility: CLINIC | Age: 79
End: 2024-12-13
Payer: MEDICARE

## 2024-12-13 DIAGNOSIS — Z91.81 AT RISK FOR FALLS: ICD-10-CM

## 2024-12-13 DIAGNOSIS — R29.898 MUSCULAR DECONDITIONING: Primary | ICD-10-CM

## 2024-12-13 DIAGNOSIS — M54.16 LUMBAR RADICULOPATHY: ICD-10-CM

## 2024-12-13 PROCEDURE — 97110 THERAPEUTIC EXERCISES: CPT

## 2024-12-13 PROCEDURE — 97112 NEUROMUSCULAR REEDUCATION: CPT

## 2024-12-13 PROCEDURE — 97530 THERAPEUTIC ACTIVITIES: CPT

## 2024-12-13 NOTE — PROGRESS NOTES
"Daily Note     Today's date: 2024  Patient name: Chloé Anderson  : 1945  MRN: [id number]  Referring provider: Carline Howard DO  Dx:   Encounter Diagnosis     ICD-10-CM    1. Muscular deconditioning  R29.898       2. Lumbar radiculopathy  M54.16       3. At risk for falls  Z91.81                      Subjective: Patient reports that she has not had much pain the past couple of days and has been noticing significant improvements with each and every day.       Objective: See treatment diary below      Assessment: Tolerated treatment well. Progressed patient with WB exercises to this date. She demonstrates good balance to transition between weight. She noted more challenge stabilizing on R side then L side. Continue to progress patient as able. Patient would benefit from continued PT      Plan: Continue per plan of care.          POC Expires Auth Status Start Date Expiration Date PT Visit Limit           Date 11/25 12/3 12/5 12/10 11/22   Used        Remaining           Diagnosis:  Balance/Deconditioning   Precautions:  See chart   Comparable signs 1) Walking  2) STS   Primary Impairments: 1) adverse neural tension  2) strength   Patient Goals Walk 1 mile   Manual Therapy 12/3 12/5 12/10 12/13 11/22 11/25   Hip distraction  KR AK  KR  SP KR                              Re-evaluation   SP       Exercise Diary          Therapeutic Exercise         Bike/Nu Step 5' 5'  5'  5' 5'   Hip 3 ways  Abd/ext/march 2x10 ea B  Abd/ext/flex 2x10 ea 1#   Abd/ext/march 2x10 ea B   Nerve glide  2x10    Femoral 2x10 Femoral 2x10   Hip fall outs      2x10    [address] 10\"x10 10x10\"   10x10\" 10x10\"   Hip sags/ PPU  On elbows 2x10 2x10 standing     On elbows 2x10   SKC         Forward flexion         LAQ/SAQ         March   1# 2x10 ea      Leg press   Seat 3   30# 1x10  35# 1x10  40# 1x10 50# 2x10              Neuromuscular Re-education         Bridges  2x10    2x10 2x10   Wobble board         Baps board         EO/EC  " "        Tandem walking   W/ foam beams x 2 laps W/ foam beams x2     Split stance     10# 2x10                                Therapeutic Activities         Education  POC, diagnosis, expecations        Side steps         STS   2x10 w/ UE assist      Step ups   Fwd   4\" 1x10  6\" 2x10 8\" 2x10     [place] carries    3 laps 10#     Modalities                            "

## 2024-12-17 ENCOUNTER — OFFICE VISIT (OUTPATIENT)
Dept: URGENT CARE | Facility: CLINIC | Age: 79
End: 2024-12-17
Payer: MEDICARE

## 2024-12-17 ENCOUNTER — OFFICE VISIT (OUTPATIENT)
Dept: PHYSICAL THERAPY | Facility: CLINIC | Age: 79
End: 2024-12-17
Payer: MEDICARE

## 2024-12-17 VITALS
TEMPERATURE: 97.4 F | BODY MASS INDEX: 24.88 KG/M2 | WEIGHT: 131.8 LBS | RESPIRATION RATE: 18 BRPM | HEIGHT: 61 IN | OXYGEN SATURATION: 97 % | HEART RATE: 64 BPM | SYSTOLIC BLOOD PRESSURE: 186 MMHG | DIASTOLIC BLOOD PRESSURE: 66 MMHG

## 2024-12-17 DIAGNOSIS — N39.0 URINARY TRACT INFECTION WITH HEMATURIA, SITE UNSPECIFIED: Primary | ICD-10-CM

## 2024-12-17 DIAGNOSIS — R29.898 MUSCULAR DECONDITIONING: Primary | ICD-10-CM

## 2024-12-17 DIAGNOSIS — Z91.81 AT RISK FOR FALLS: ICD-10-CM

## 2024-12-17 DIAGNOSIS — M54.16 LUMBAR RADICULOPATHY: ICD-10-CM

## 2024-12-17 DIAGNOSIS — R31.9 URINARY TRACT INFECTION WITH HEMATURIA, SITE UNSPECIFIED: Primary | ICD-10-CM

## 2024-12-17 LAB
SL AMB  POCT GLUCOSE, UA: ABNORMAL
SL AMB LEUKOCYTE ESTERASE,UA: ABNORMAL
SL AMB POCT BILIRUBIN,UA: ABNORMAL
SL AMB POCT BLOOD,UA: ABNORMAL
SL AMB POCT CLARITY,UA: ABNORMAL
SL AMB POCT COLOR,UA: ABNORMAL
SL AMB POCT KETONES,UA: ABNORMAL
SL AMB POCT NITRITE,UA: ABNORMAL
SL AMB POCT PH,UA: 7.5
SL AMB POCT SPECIFIC GRAVITY,UA: 1
SL AMB POCT URINE PROTEIN: 30
SL AMB POCT UROBILINOGEN: 0.2

## 2024-12-17 PROCEDURE — 97110 THERAPEUTIC EXERCISES: CPT

## 2024-12-17 PROCEDURE — G0463 HOSPITAL OUTPT CLINIC VISIT: HCPCS | Performed by: PHYSICIAN ASSISTANT

## 2024-12-17 PROCEDURE — 97112 NEUROMUSCULAR REEDUCATION: CPT

## 2024-12-17 PROCEDURE — 99214 OFFICE O/P EST MOD 30 MIN: CPT | Performed by: PHYSICIAN ASSISTANT

## 2024-12-17 PROCEDURE — 97530 THERAPEUTIC ACTIVITIES: CPT

## 2024-12-17 PROCEDURE — 87086 URINE CULTURE/COLONY COUNT: CPT | Performed by: PHYSICIAN ASSISTANT

## 2024-12-17 PROCEDURE — 81002 URINALYSIS NONAUTO W/O SCOPE: CPT | Performed by: PHYSICIAN ASSISTANT

## 2024-12-17 RX ORDER — NITROFURANTOIN 25; 75 MG/1; MG/1
100 CAPSULE ORAL 2 TIMES DAILY
Qty: 14 CAPSULE | Refills: 0 | Status: SHIPPED | OUTPATIENT
Start: 2024-12-17 | End: 2024-12-24

## 2024-12-17 NOTE — PROGRESS NOTES
"Daily Note     Today's date: 2024  Patient name: Chloé Anderson  : 1945  MRN: [id number]  Referring provider: Carline Howard DO  Dx:   Encounter Diagnosis     ICD-10-CM    1. Muscular deconditioning  R29.898       2. At risk for falls  Z91.81       3. Lumbar radiculopathy  M54.16                      Subjective: Patient reports that she has had chronic UTIs and has been dealing with that so she noted that her hip has a bit difficulty.       Objective: See treatment diary below      Assessment: Tolerated treatment well. Patient responded well with decreasing symptoms after mobility and extension exercises. She demonstrates good balance with plate sliders but most challenged with pushing off the R foot. She demonstrates weakness in her R side due to increased pain today.   Patient would benefit from continued PT      Plan: Continue per plan of care.          POC Expires Auth Status Start Date Expiration Date PT Visit Limit           Date 11/25 12/3 12/5 12/10 11/22   Used        Remaining           Diagnosis:  Balance/Deconditioning   Precautions:  See chart   Comparable signs 1) Walking  2) STS   Primary Impairments: 1) adverse neural tension  2) strength   Patient Goals Walk 1 mile   Manual Therapy 12/3 12/5 12/10 12/13 12/17 11/25   Hip distraction  KR AK  KR   KR                              Re-evaluation   SP       Exercise Diary          Therapeutic Exercise         Bike/Nu Step 5' 5'  5'  5'  5'   Hip 3 ways  Abd/ext/march 2x10 ea B  Abd/ext/flex 2x10 ea 1#   Abd/ext/march 2x10 ea B   Nerve glide  2x10     Femoral 2x10   Hip fall outs          [address] 10\"x10 10x10\"   10x10\" 10x10\"   Hip sags/ PPU  On elbows 2x10 2x10 standing    2x10 standing  On elbows 2x10   SKC         Forward flexion         LAQ/SAQ         March   1# 2x10 ea      Leg press   Seat 3   30# 1x10  35# 1x10  40# 1x10 50# 2x10 55# 3x10             Neuromuscular Re-education         Bridges  2x10     2x10   Wobble board    " "     Baps board         EO/EC          Tandem walking   W/ foam beams x 2 laps W/ foam beams x2     Split stance     10# 2x10     Plate sliders     1 laps 10#                       Therapeutic Activities         Education  POC, diagnosis, expecations        Side steps         STS   2x10 w/ UE assist      Step ups   Fwd   4\" 1x10  6\" 2x10 8\" 2x10     [place] carries    3 laps 10# 3 laps 10#    Modalities                            "

## 2024-12-17 NOTE — PROGRESS NOTES
[facility]        NAME: Chloé Anderson is a 79 y.o. female  : 1945    MRN: [id number]  DATE: 2024  TIME: 12:48 PM    Assessment and Plan   Urinary tract infection with hematuria, site unspecified [N39.0, R31.9]  1. Urinary tract infection with hematuria, site unspecified  POCT urine dip    Urine culture    Urine culture    nitrofurantoin (MACROBID) 100 mg capsule    Ambulatory Referral to Obstetrics / Gynecology      Patient presents with symptoms and examination consistent with UTI.  UA is also consistent.  Recommend Macrobid x 1 week.  Patient has follow-up appointment with urology scheduled on 1/3/2025.  Referred to OB/GYN as well as patient notes that she did have a history of pelvic floor prolapse.      Patient Instructions     Patient Instructions   Take Macrobid as prescribed.  If symptoms are not improved in  2-3 days, follow-up with PCP.   If symptoms worsen or new symptoms develop, report to the emergency department immediately.     Follow up with PCP in 3-5 days.  Proceed to  ER if symptoms worsen.    If tests have been performed at [facility], our office will contact you with results if changes need to be made to the care plan discussed with you at the visit. You can review your full results on [facility] MyChart.     Chief Complaint     Chief Complaint   Patient presents with    Possible UTI     History of chronic UTI's. Urgency, frequency, urgency, and flank pain with urination.         History of Present Illness       1-year-old female presents with complaint of urgency, frequency, and pelvic pain with urination for approximately 3 to 4 days.  Patient notes history of chronic recurrent UTIs.  Patient denies fever, chills, nausea, vomiting, and diarrhea.        Review of Systems   Review of Systems   Constitutional:  Negative for chills and fever.   Gastrointestinal:  Negative for abdominal distention, abdominal pain, diarrhea, nausea and vomiting.   Genitourinary:  Positive  for dysuria, flank pain, frequency and urgency.   Musculoskeletal:  Negative for back pain.         Current Medications       Current Outpatient Medications:     Calcium 500 MG tablet, Take 500 mg by mouth in the morning, Disp: , Rfl:     Cholecalciferol (Vitamin D3) 50 MCG (2000 UT) TABS, Take 1 tablet by mouth daily, Disp: , Rfl:     famotidine (PEPCID) 20 mg tablet, TAKE 1 TABLET BY MOUTH 2 TIMES A DAY AS NEEDED FOR HEARTBURN., Disp: 180 tablet, Rfl: 1    lisinopril (ZESTRIL) 20 mg tablet, TAKE 1 TABLET BY MOUTH EVERY DAY, Disp: 90 tablet, Rfl: 1    Magnesium Bisglycinate 100 MG TABS, Take 400 mg by mouth in the morning, Disp: , Rfl:     mometasone (ELOCON) 0.1 % cream, Apply topically 2 (two) times a day as needed (Ear Eczema), Disp: 15 g, Rfl: 0    nebivolol (BYSTOLIC) 20 MG tablet, TAKE 1 TABLET BY MOUTH EVERY DAY, Disp: 90 tablet, Rfl: 1    nitrofurantoin (MACROBID) 100 mg capsule, Take 1 capsule (100 mg total) by mouth 2 (two) times a day for 7 days, Disp: 14 capsule, Rfl: 0    Sodium Fluoride 5000 PPM 1.1 % GEL, Take 1 Application by mouth daily at bedtime, Disp: , Rfl:     triamcinolone (KENALOG) 0.1 % cream, Apply 1 Application topically 2 (two) times a day as needed for rash, Disp: , Rfl:     Current Allergies     Allergies as of 12/17/2024 - Reviewed 12/17/2024   Allergen Reaction Noted    Amlodipine Shortness Of Breath 08/12/2024    Telmisartan Shortness Of Breath 08/12/2024    Codeine Nausea Only and Vomiting 09/24/2024    Bactrim [sulfamethoxazole-trimethoprim] GI Intolerance 08/12/2024    Hydralazine Hives 08/12/2024    Losartan GI Intolerance 08/12/2024    Hydrocodone GI Intolerance 09/24/2024    Indapamide Other (See Comments) 08/12/2024    Lisinopril Cough 09/24/2024    Oxycodone GI Intolerance and Vomiting 08/12/2024            The following portions of the patient's history were reviewed and updated as appropriate: allergies, current medications, past family history, past medical history, past  "social history, past surgical history and problem list.     Past Medical History:   Diagnosis Date    Anxiety     Aortic regurgitation 2024    Baker's cyst     Eczema 2024    GERD (gastroesophageal reflux disease)     Grade II diastolic dysfunction 2024    Hyperlipidemia     Hypertension     Hypertensive retinopathy of both eyes 10/02/2024    Hypomagnesemia 10/02/2024    Hyponatremia 10/02/2024    Left atrial enlargement     Mitral regurgitation 2024    Mitral valve sclerosis     Osteoarthritis     Osteoporosis     Overweight 2024    Posterior vitreous detachment of left eye 10/02/2024    Pulmonary hypertension (HCC)     Stable branch retinal vein occlusion of left eye 10/02/2024    Tricuspid regurgitation 2024    Vitreous debris 10/02/2024    Vitreous syneresis of right eye 10/02/2024    White without pressure of peripheral retina of both eyes 10/02/2024       Past Surgical History:   Procedure Laterality Date    CATARACT EXTRACTION Bilateral 2010     SECTION  1972    EGD  2022    LAPAROSCOPIC TOTAL HYSTERECTOMY  2016    Uterine Prolapse    STRABISMUS SURGERY Bilateral 1955       Family History   Problem Relation Age of Onset    Heart failure Mother         passed at 90    Cataracts Mother     Anxiety disorder Mother     Alcohol abuse Father     Stroke Father     Heart attack Father         passed at 69    Heart disease Father     Cancer Sister         passed at 78    Anxiety disorder Sister     Obesity Brother         passed at 69         Medications have been verified.        Objective   BP (!) 186/66 (BP Location: Left arm, Patient Position: Sitting, Cuff Size: Standard)   Pulse 64   Temp (!) 97.4 °F (36.3 °C) (Temporal)   Resp 18   Ht 5' 1\" (1.549 m)   Wt 59.8 kg (131 lb 12.8 oz)   SpO2 97%   BMI 24.90 kg/m²   No LMP recorded.       Physical Exam     Physical Exam  Vitals and nursing note reviewed.   Constitutional:       General: She is " "awake. She is not in acute distress.     Appearance: Normal appearance. She is well-developed and well-groomed. She is not ill-appearing, toxic-appearing or diaphoretic.   HENT:      Head: Normocephalic and atraumatic.      Right Ear: Hearing and external ear normal.      Left Ear: Hearing and external ear normal.   Eyes:      General: Lids are normal. Vision grossly intact. Gaze aligned appropriately.   Cardiovascular:      Rate and Rhythm: Normal rate.   Pulmonary:      Effort: Pulmonary effort is normal.   Abdominal:      General: Abdomen is flat.      Palpations: Abdomen is soft.      Tenderness: There is no abdominal tenderness. There is no right CVA tenderness or left CVA tenderness.   Musculoskeletal:      Cervical back: Normal range of motion.   Skin:     General: Skin is warm and dry.   Neurological:      Mental Status: She is alert and oriented to person, place, and time.      Coordination: Coordination is intact.      Gait: Gait is intact.   Psychiatric:         Attention and Perception: Attention and perception normal.         Mood and Affect: Mood and affect normal.         Speech: Speech normal.         Behavior: Behavior normal. Behavior is cooperative.               Note: Portions of this record may have been created with voice recognition software. Occasional wrong word or \"sound a like\" substitutions may have occurred due to the inherent limitations of voice recognition software. Please read the chart carefully and recognize, using context, where substitutions have occurred.*      "

## 2024-12-17 NOTE — PATIENT INSTRUCTIONS
Take Macrobid as prescribed.  If symptoms are not improved in  2-3 days, follow-up with PCP.   If symptoms worsen or new symptoms develop, report to the emergency department immediately.

## 2024-12-19 LAB — BACTERIA UR CULT: NORMAL

## 2024-12-20 ENCOUNTER — OFFICE VISIT (OUTPATIENT)
Dept: PHYSICAL THERAPY | Facility: CLINIC | Age: 79
End: 2024-12-20
Payer: MEDICARE

## 2024-12-20 DIAGNOSIS — Z91.81 AT RISK FOR FALLS: ICD-10-CM

## 2024-12-20 DIAGNOSIS — R29.898 MUSCULAR DECONDITIONING: Primary | ICD-10-CM

## 2024-12-20 DIAGNOSIS — M54.16 LUMBAR RADICULOPATHY: ICD-10-CM

## 2024-12-20 PROCEDURE — 97112 NEUROMUSCULAR REEDUCATION: CPT

## 2024-12-20 PROCEDURE — 97110 THERAPEUTIC EXERCISES: CPT

## 2024-12-20 PROCEDURE — 97530 THERAPEUTIC ACTIVITIES: CPT

## 2024-12-20 NOTE — PROGRESS NOTES
"Daily Note     Today's date: 2024  Patient name: Chloé Anderson  : 1945  MRN: [id number]  Referring provider: Carline Howard DO  Dx:   Encounter Diagnosis     ICD-10-CM    1. Muscular deconditioning  R29.898       2. At risk for falls  Z91.81       3. Lumbar radiculopathy  M54.16           Start Time: 1000  Stop Time: 1045  Total time in clinic (min): 45 minutes    Subjective: Patient reports having a \"twinge/pinch of hip pain\" after progressions of strengthening exercises during last session, which subsided this morning.    Objective: See treatment diary below      Assessment: : Tolerated treatment well. Focused today's session on B LE/lumbar strengthening, stability/balance, and functional exercises, with no adverse response or increases in sxs demonstrated throughout session. Increased the weight during leg press today which was was challenging initially for patient, but improved throughout duration and with VC to eccentrically lower herself on descent. Modified step ups by decreasing step up height since patient was unsteady today when performing exercise from previous progression of 8 in height. Trial 8\" step NV if tolerable.  Continue to progress patient as able. Patient would benefit from continued PT to address remaining deficits.      Plan: Continue per plan of care.          POC Expires Auth Status Start Date Expiration Date PT Visit Limit           Date 11/25 12/3 12/5 12/10 12/20   Used        Remaining           Diagnosis:  Balance/Deconditioning   Precautions:  See chart   Comparable signs 1) Walking  2) STS   Primary Impairments: 1) adverse neural tension  2) strength   Patient Goals Walk 1 mile   Manual Therapy 12/3 12/5 12/10 12/13 12/17 12/20   Hip distraction  KR AK  KR                                 Re-evaluation   SP       Exercise Diary          Therapeutic Exercise         Bike/Nu Step 5' 5'  5'  5'  5'   Hip 3 ways  Abd/ext/march 2x10 ea B  Abd/ext/flex 2x10 ea 1#   " "Abd/ext/march 2x10 ea 1.5#   Nerve glide  2x10        Hip fall outs          [address] 10\"x10 10x10\"   10x10\"    Hip sags/ PPU  On elbows 2x10 2x10 standing    2x10 standing     SKC         Forward flexion         LAQ/SAQ         Marches   1# 2x10 ea   1.5# 2x10   Leg press   Seat 3   30# 1x10  35# 1x10  40# 1x10 50# 2x10 55# 3x10 60# 3x10            Neuromuscular Re-education         Bridges  2x10        Wobble board         Baps board         EO/EC          Tandem walking   W/ foam beams x 2 laps W/ foam beams x2     Split stance     10# 2x10     Plate sliders     1 laps 10#  1 laps 5# AW on slider                     Therapeutic Activities         Education  POC, diagnosis, expecations        Side steps      1.5# 2 laps   STS   2x10 w/ UE assist      Step ups   Fwd   4\" 1x10  6\" 2x10 8\" 2x10  Fwd 6\" 1x10/  8\" 1x10    Lat 6\" 2x10   [place] carries    3 laps 10#  3 laps 10#   Modalities                            "

## 2024-12-23 ENCOUNTER — OFFICE VISIT (OUTPATIENT)
Dept: PHYSICAL THERAPY | Facility: CLINIC | Age: 79
End: 2024-12-23
Payer: MEDICARE

## 2024-12-23 DIAGNOSIS — Z91.81 AT RISK FOR FALLS: ICD-10-CM

## 2024-12-23 DIAGNOSIS — M54.16 LUMBAR RADICULOPATHY: ICD-10-CM

## 2024-12-23 DIAGNOSIS — R29.898 MUSCULAR DECONDITIONING: Primary | ICD-10-CM

## 2024-12-23 PROCEDURE — 97140 MANUAL THERAPY 1/> REGIONS: CPT

## 2024-12-23 PROCEDURE — 97112 NEUROMUSCULAR REEDUCATION: CPT

## 2024-12-23 PROCEDURE — 97110 THERAPEUTIC EXERCISES: CPT

## 2024-12-23 NOTE — PROGRESS NOTES
"Daily Note     Today's date: 2024  Patient name: Chloé Anderson  : 1945  MRN: [id number]  Referring provider: Carline Howard DO  Dx:   Encounter Diagnosis     ICD-10-CM    1. Muscular deconditioning  R29.898       2. At risk for falls  Z91.81       3. Lumbar radiculopathy  M54.16                      Subjective: Pt entered treatment today stating that she is feeling a little stiff this morning.       Objective: See treatment diary below      Assessment: Tolerated treatment well. Patient demonstrated fatigue post treatment, exhibited good technique with therapeutic exercises, and would benefit from continued PT      Plan: Continue per plan of care.          POC Expires Auth Status Start Date Expiration Date PT Visit Limit           Date 11/25 12/3 12/5 12/10 12/20   Used        Remaining           Diagnosis:  Balance/Deconditioning   Precautions:  See chart   Comparable signs 1) Walking  2) STS   Primary Impairments: 1) adverse neural tension  2) strength   Patient Goals Walk 1 mile   Manual Therapy 12/23 12/5 12/10 12/13 12/17 12/20   Hip distraction  AK AK  KR                                 Re-evaluation   SP       Exercise Diary          Therapeutic Exercise         Bike/Nu Step 5' 5'  5'  5'  5'   Hip 3 ways    Abd/ext/flex 2x10 ea 1#   Abd/ext/march 2x10 ea 1.5#   Nerve glide          Hip fall outs          [address] 10x10\" 10x10\"   10x10\"    Hip sags/ PPU   2x10 standing    2x10 standing     SKC         Forward flexion         LAQ/SAQ         [place]   1# 2x10 ea   1.5# 2x10   Leg press   Seat 3   30# 1x10  35# 1x10  40# 1x10 50# 2x10 55# 3x10 60# 3x10   LTR x20        Open books x15                 Neuromuscular Re-education         Bridges  2x10        Wobble board         Baps board         EO/EC          Tandem walking   W/ foam beams x 2 laps W/ foam beams x2     Split stance     10# 2x10     Plate sliders     1 laps 10#  1 laps 5# AW on slider   SLR 2x10                 Therapeutic " "Activities         Education          Side steps      1.5# 2 laps   STS   2x10 w/ UE assist      Step ups   Fwd   4\" 1x10  6\" 2x10 8\" 2x10  Fwd 6\" 1x10/  8\" 1x10    Lat 6\" 2x10   [place] carries    3 laps 10#  3 laps 10#   Modalities                              "

## 2024-12-27 ENCOUNTER — APPOINTMENT (OUTPATIENT)
Dept: PHYSICAL THERAPY | Facility: CLINIC | Age: 79
End: 2024-12-27
Payer: MEDICARE

## 2024-12-31 ENCOUNTER — APPOINTMENT (OUTPATIENT)
Dept: PHYSICAL THERAPY | Facility: CLINIC | Age: 79
End: 2024-12-31
Payer: MEDICARE

## 2025-01-03 ENCOUNTER — CONSULT (OUTPATIENT)
Age: 80
End: 2025-01-03
Payer: MEDICARE

## 2025-01-03 ENCOUNTER — APPOINTMENT (OUTPATIENT)
Dept: PHYSICAL THERAPY | Facility: CLINIC | Age: 80
End: 2025-01-03
Payer: MEDICARE

## 2025-01-03 VITALS
SYSTOLIC BLOOD PRESSURE: 130 MMHG | HEIGHT: 61 IN | DIASTOLIC BLOOD PRESSURE: 86 MMHG | BODY MASS INDEX: 24.92 KG/M2 | OXYGEN SATURATION: 99 % | HEART RATE: 63 BPM | WEIGHT: 132 LBS

## 2025-01-03 DIAGNOSIS — N39.0 RECURRENT UTI: ICD-10-CM

## 2025-01-03 DIAGNOSIS — R30.0 DYSURIA: Primary | ICD-10-CM

## 2025-01-03 DIAGNOSIS — Z87.440 HISTORY OF RECURRENT UTIS: ICD-10-CM

## 2025-01-03 LAB
POST-VOID RESIDUAL VOLUME, ML POC: 125 ML
SL AMB  POCT GLUCOSE, UA: NORMAL
SL AMB LEUKOCYTE ESTERASE,UA: NORMAL
SL AMB POCT BILIRUBIN,UA: NORMAL
SL AMB POCT BLOOD,UA: NORMAL
SL AMB POCT CLARITY,UA: CLEAR
SL AMB POCT COLOR,UA: YELLOW
SL AMB POCT KETONES,UA: NORMAL
SL AMB POCT NITRITE,UA: NORMAL
SL AMB POCT PH,UA: 6.5
SL AMB POCT SPECIFIC GRAVITY,UA: 1010
SL AMB POCT URINE PROTEIN: NORMAL
SL AMB POCT UROBILINOGEN: NORMAL

## 2025-01-03 PROCEDURE — 51798 US URINE CAPACITY MEASURE: CPT | Performed by: PHYSICIAN ASSISTANT

## 2025-01-03 PROCEDURE — 99203 OFFICE O/P NEW LOW 30 MIN: CPT | Performed by: PHYSICIAN ASSISTANT

## 2025-01-03 PROCEDURE — 81003 URINALYSIS AUTO W/O SCOPE: CPT | Performed by: PHYSICIAN ASSISTANT

## 2025-01-03 RX ORDER — CEPHALEXIN 500 MG/1
500 CAPSULE ORAL EVERY 12 HOURS SCHEDULED
Qty: 14 CAPSULE | Refills: 0 | Status: SHIPPED | OUTPATIENT
Start: 2025-01-03 | End: 2025-01-10

## 2025-01-07 ENCOUNTER — OFFICE VISIT (OUTPATIENT)
Dept: PHYSICAL THERAPY | Facility: CLINIC | Age: 80
End: 2025-01-07
Payer: MEDICARE

## 2025-01-07 DIAGNOSIS — M54.16 LUMBAR RADICULOPATHY: ICD-10-CM

## 2025-01-07 DIAGNOSIS — Z91.81 AT RISK FOR FALLS: ICD-10-CM

## 2025-01-07 DIAGNOSIS — R29.898 MUSCULAR DECONDITIONING: Primary | ICD-10-CM

## 2025-01-07 PROCEDURE — 97110 THERAPEUTIC EXERCISES: CPT

## 2025-01-07 PROCEDURE — 97112 NEUROMUSCULAR REEDUCATION: CPT

## 2025-01-07 NOTE — PROGRESS NOTES
"Daily Note     Today's date: 2025  Patient name: Chloé Anderson  : 1945  MRN: [id number]  Referring provider: Carline Howard DO  Dx:   Encounter Diagnosis     ICD-10-CM    1. Muscular deconditioning  R29.898       2. At risk for falls  Z91.81       3. Lumbar radiculopathy  M54.16             Start Time: 1000  Stop Time: 1045  Total time in clinic (min): 45 minutes    Subjective: Patient reports \"I can't believe how much I have improved and how strong I have gotten since I started PT here\", noting that she no longer experiences B hip tightness and stiffness for more than an hour or so after she wakes up, as compared to the past where she wouldn't get relief of sxs until about 2/3 in the afternoon.      Objective: See treatment diary below      Assessment: Tolerated treatment well. Progressed B hip strengthening exercises by increasing weight during hip 3 way and sidestepping, which was more challenging and ftiguing, but did not cause any increases in sxs t/o performance. Added both fwd and lateral didier walking today to further challenge patient's balance and B LE stability, with no significant episodes of instability or LOB t/o. Continue to progress patient as able. Patient demonstrated fatigue post treatment, exhibited good technique with therapeutic exercises, and would benefit from continued PT      Plan: Continue per plan of care.          POC Expires Auth Status Start Date Expiration Date PT Visit Limit           Date 12/23 1/7 12/5 12/10 12/20   Used        Remaining           Diagnosis:  Balance/Deconditioning   Precautions:  See chart   Comparable signs 1) Walking  2) STS   Primary Impairments: 1) adverse neural tension  2) strength   Patient Goals Walk 1 mile   Manual Therapy 12/23 1/7 12/10 12/13 12/17 12/20   Hip distraction  AK  KR                                 Re-evaluation          Exercise Diary          Therapeutic Exercise         Bike/Nu Step 5' 5' 5'  5'  5'   Hip 3 ways   " "Abd/ext/march 2# 2x10 ea kike Abd/ext/flex 2x10 ea 1#   Abd/ext/march 2x10 ea 1.5#   Nerve glide          Hip fall outs          [address] 10x10\"    10x10\"    Hip sags/ PPU      2x10 standing     SKC         Forward flexion         LAQ/SAQ         Marches  2# 2x10 kike 1# 2x10 ea   1.5# 2x10   Leg press  65# 2x15 Seat 3   30# 1x10  35# 1x10  40# 1x10 50# 2x10 55# 3x10 60# 3x10   LTR x20        Open books x15                 Neuromuscular Re-education         Bridges  2x10        Wobble board         Baps board         EO/EC          Tandem walking  W/ Hurdles and foam beams x 2 laps W/ foam beams x 2 laps W/ foam beams x2     Split stance     10# 2x10     Plate sliders     1 laps 10#  1 laps 5# AW on slider   SLR 2x10                 Therapeutic Activities         Education          Side steps  2# 2 laps  W/ hurdles and foam beams x 2 laps    1.5# 2 laps   STS   2x10 w/ UE assist      Step ups  Fwd 6\" 2x10 Fwd   4\" 1x10  6\" 2x10 8\" 2x10  Fwd 6\" 1x10/  8\" 1x10    Lat 6\" 2x10   [place] carries    3 laps 10#  3 laps 10#   Modalities                              "

## 2025-01-10 ENCOUNTER — EVALUATION (OUTPATIENT)
Dept: PHYSICAL THERAPY | Facility: CLINIC | Age: 80
End: 2025-01-10
Payer: MEDICARE

## 2025-01-10 DIAGNOSIS — M54.16 LUMBAR RADICULOPATHY: ICD-10-CM

## 2025-01-10 DIAGNOSIS — Z91.81 AT RISK FOR FALLS: ICD-10-CM

## 2025-01-10 DIAGNOSIS — R29.898 MUSCULAR DECONDITIONING: Primary | ICD-10-CM

## 2025-01-10 PROCEDURE — 97140 MANUAL THERAPY 1/> REGIONS: CPT

## 2025-01-10 PROCEDURE — 97112 NEUROMUSCULAR REEDUCATION: CPT

## 2025-01-10 PROCEDURE — 97110 THERAPEUTIC EXERCISES: CPT

## 2025-01-10 NOTE — PROGRESS NOTES
PT Re-Evaluation     Today's date: 1/10/2025  Patient name: Chloé Anderson  : 1945  MRN: [id number]  Referring provider: Carline Howard DO  Dx:   Encounter Diagnosis     ICD-10-CM    1. Muscular deconditioning  R29.898       2. At risk for falls  Z91.81       3. Lumbar radiculopathy  M54.16                      Assessment  Impairments: abnormal muscle firing, abnormal or restricted ROM, activity intolerance, impaired physical strength, lacks appropriate home exercise program, pain with function, poor posture  and poor body mechanics    Assessment details: RE today/date: 1/10/2025 Patient presents for re-evaluation after participating in physical therapy. At this point they have been compliant with HEP and PT to this date and has made progress with ROM and strength and overall pain and has reduced to none to this date. At this point they are still lacking endurance primarily and will start to transition to HEP at the end of the month. They will continue to benefit from skilled PT 1x a to continue to address remaining impairments, and help with progress to HEP to ensure overall safety for home and progress toward optimal function and improve quality of life.     RE today/date: 2024 Patient presents for re-evaluation after participating in physical therapy. At this point they have been compliant with HEP and PT to this date and has made progress with ROM and strength and overall pain to this date. She has reduced pain in her leg to this date which has improved with overall function . At this point they are still lacking strength, endurance and increased pain in her hip with some remaining neural tension. They will continue to benefit from skilled PT to continue to address remaining impairments and progress toward optimal function and improve quality of life.     IE:Chloé Anderson is a pleasant 79 y.o. female presents with signs and symptoms consistent with:   Balance  Lumbar Radiculopathy     Problem  List:  1) Adverse neural tension  2) decreased functional outcomes     Comparable signs:  1) walking   2) strength     she has decreased strength, adverse neural tension, and decreased functional outcomes putting her at more risk of falls resulting in worry over not knowing what's wrong and fear of not being able to keep active. These impairments listed above are preventing the patient from participating in functional activity. No further referral appears necessary at this time based upon examination results, and is negative for any red flags. Prognosis is good given HEP compliance and attendance to physical therapy 2x a week.  Positive prognostic indicators include positive attitude toward recovery and good understanding of diagnosis and treatment plan options.  Negative prognostic indicators include none.  Patent will benefit from skilled physical therapy at this time to address deficits to improve overall function and return to PLOF. Patient verbalized understanding of POC, HEP, and return demonstrated HEP. All questions were answered to patients satisfaction.     Please contact me if you have any questions or recommendations. Thank you for the referral and the opportunity to share in Chloé Anderson's care.            Understanding of Dx/Px/POC: good     Prognosis: good    Goals  Impairment Goals 4-6 weeks Progressing 1/10  In order to improve and maximize function patient will be able to...  - Improve overall strength in both LE and UE to 5/5  - Demonstrate McTSIB within age related norms  - Demonstrate at least 75% on ABC scale  - Centralize symptoms        Functional Goals 6-8 weeks Progressing 1/10  In order to improve and maximize function patient will be able to...  - Return to Prior Level of Function with no greater than 2/10 pain   - Increase Functional Status Measure (FOTO) to: >predicted outcomes  - Be independent and compliant with HEP   - Participate in ADL/IADLS without feeling unsteady or LOB  -  Decrease TUG and 5xSTS to below 13 seconds to demonstrate less risk of falls by DC  - Report no instances of falls since beginning PT  - Ambulate on even and uneven surfaces safely without an AD.           Plan  Patient would benefit from: skilled physical therapy  Planned modality interventions: cryotherapy, thermotherapy: hydrocollator packs and TENS    Planned therapy interventions: home exercise program, graded exercise, functional ROM exercises, flexibility, body mechanics training, postural training, patient education, therapeutic activities, therapeutic exercise, manual therapy, joint mobilization, neuromuscular re-education, motor coordination training, graded activity, stretching and strengthening    Frequency: 2x week  Duration in weeks: 8  Treatment plan discussed with: patient and PCP        Subjective Evaluation    History of Present Illness  Mechanism of injury: RE 1/10/24 Patient presents to PT today for leg pain and endurance. She reports her pain has decreased. She is 95-99% on a daily basis. She reports that the leg pain has been good and has improved 1000 percent. She reports that her balance has been good, she reports that she still struggles with endurance. She feels better since has gotten her urology issues figured out. She reports that she needs to build up endurance, and still feels a bit unsteady steps.     RE 12/5/24 Patient presents to PT today for leg pain and balance with 90-95% improved. She demonstrates improvement with her neuropathy and leg pain overall. She reports the leg pain has decreased and no longer has the leg pain. She reports that her leg pain has been better and able to have more mobility. She reports that she feels that her endurance is still decreased. She is not up to a mile yet but overall walking is better she is able to go up and down the stairs and not relying on the cane.     IE:Patient presents to PT today for balance. Patient just recently moved from [place] and  is not used to inclines and strength. She was recently was on cipro and had tingling numbness and she has had that since September. She reports that any time she is movement and she reports numbness and tingling in groin and the front of her leg. She reports that pain continues to be debilitating. She reports she used to walk a mile a day at [place]. She reports that pain is releived with hot showers, some massage feels better. She reports pain is relative     Difficulty with: walking, up and down stairs  Patient Goals  Patient goals for therapy: decreased pain, independence with ADLs/IADLs and return to sport/leisure activities  Patient goal: return to walking a mile (progressing)  Pain  Current pain ratin  At worst pain ratin  Location: front of the hip (pain in the morning)  Quality: radiating, needle-like and sharp  Relieving factors: medications (tylenol)  Aggravating factors: walking and stair climbing      Diagnostic Tests  No diagnostic tests performed  Treatments  No previous or current treatments      Objective     Concurrent Complaints  Negative for night pain, disturbed sleep, bladder dysfunction, bowel dysfunction and saddle (S4) numbness    Active Range of Motion     Lumbar   Flexion:  Restriction level: minimal  Extension:  Restriction level: minimal    Joint Play   Joints within functional limits: L1, L2 and L3   Mechanical Assessment    Cervical      Thoracic      Lumbar    Standing extension: repeated movements  Pain location: centralized  Pain intensity: better  Pain level: abolished    Strength/Myotome Testing     Left Hip   Planes of Motion   Flexion: 4+  Extension: 4+  Abduction: 4+  External rotation: 4+  Internal rotation: 4+    Isolated Muscles   Gluteus medius: 4-    Right Hip   Planes of Motion   Flexion: 4+  Extension: 4+  Abduction: 4+  External rotation: 4+  Internal rotation: 4+    Isolated Muscles   Gluteus medius: 4-    Functional Assessment        Comments  5x STS:13s w/  "arms   TUG:10s  30s STS 10x w/ arms           General Comments:    Lower quarter screen   Hips: unremarkable  Knees: unremarkable  Foot/ankle: unremarkable               POC Expires Auth Status Start Date Expiration Date PT Visit Limit    12/7       Date 12/23 1/7 12/5 12/10 12/20   Used        Remaining           Diagnosis:  Balance/Deconditioning   Precautions:  See chart   Comparable signs 1) Walking  2) STS   Primary Impairments: 1) adverse neural tension  2) strength   Patient Goals Walk 1 mile   Manual Therapy 12/23 1/7 1/10 12/13 12/17 12/20   Hip distraction  AK                                   Re-evaluation    SP      Exercise Diary          Therapeutic Exercise         Bike/Nu Step 5' 5' 5'  5'  5'   Hip 3 ways   Abd/ext/march 2# 2x10 ea kike    Abd/ext/march 2x10 ea 1.5#   Nerve glide          Hip fall outs          [address] 10x10\"    10x10\"    Hip sags/ PPU      2x10 standing     SKC         Forward flexion         LAQ/SAQ         Marches  2# 2x10 kike    1.5# 2x10   Leg press  65# 2x15  50# 2x10 55# 3x10 60# 3x10   LTR x20        Open books x15                 Neuromuscular Re-education         Bridges  2x10        Wobble board         Baps board         EO/EC          Tandem walking  W/ Hurdles and foam beams x 2 laps  W/ foam beams x2     Split stance     10# 2x10     Plate sliders     1 laps 10#  1 laps 5# AW on slider   SLR 2x10                 Therapeutic Activities         Education          Side steps  2# 2 laps  W/ hurdles and foam beams x 2 laps    1.5# 2 laps   STS         Step ups  Fwd 6\" 2x10  8\" 2x10  Fwd 6\" 1x10/  8\" 1x10    Lat 6\" 2x10   [place] carries    3 laps 10#  3 laps 10#   Modalities                            "

## 2025-01-14 ENCOUNTER — OFFICE VISIT (OUTPATIENT)
Dept: PHYSICAL THERAPY | Facility: CLINIC | Age: 80
End: 2025-01-14
Payer: MEDICARE

## 2025-01-14 DIAGNOSIS — R29.898 MUSCULAR DECONDITIONING: Primary | ICD-10-CM

## 2025-01-14 DIAGNOSIS — M54.16 LUMBAR RADICULOPATHY: ICD-10-CM

## 2025-01-14 DIAGNOSIS — Z91.81 AT RISK FOR FALLS: ICD-10-CM

## 2025-01-14 PROCEDURE — 97110 THERAPEUTIC EXERCISES: CPT

## 2025-01-14 PROCEDURE — 97112 NEUROMUSCULAR REEDUCATION: CPT

## 2025-01-14 PROCEDURE — 97530 THERAPEUTIC ACTIVITIES: CPT

## 2025-01-14 NOTE — PROGRESS NOTES
"Daily Note     Today's date: 2025  Patient name: Chloé Anderson  : 1945  MRN: [id number]  Referring provider: Carline Howard DO  Dx: No diagnosis found.               Subjective: Patient reports      Objective: See treatment diary below      Assessment: Tolerated treatment well. Patient would benefit from continued PT      Plan: Continue per plan of care.          POC Expires Auth Status Start Date Expiration Date PT Visit Limit           Date 12/23 1/7 1/10 1/14 12/20   Used        Remaining           Diagnosis:  Balance/Deconditioning   Precautions:  See chart   Comparable signs 1) Walking  2) STS   Primary Impairments: 1) adverse neural tension  2) strength   Patient Goals Walk 1 mile   Manual Therapy 12/23 1/7 1/10 12/13 12/17 12/20   Hip distraction  AK                                   Re-evaluation    SP      Exercise Diary          Therapeutic Exercise         Bike/Nu Step 5' 5' 5'  5'  5'   Hip 3 ways   Abd/ext/march 2# 2x10 ea kike    Abd/ext/march 2x10 ea 1.5#   Nerve glide          Hip fall outs          [address] 10x10\"    10x10\"    Hip sags/ PPU      2x10 standing     SKC         Forward flexion         LAQ/SAQ           2# 2x10 kike    1.5# 2x10   Leg press  65# 2x15  50# 2x10 55# 3x10 60# 3x10   LTR x20        Open books x15                 Neuromuscular Re-education         Bridges  2x10        Wobble board         Baps board         EO/EC          Tandem walking  W/ Hurdles and foam beams x 2 laps  W/ foam beams x2     Split stance     10# 2x10     Plate sliders     1 laps 10#  1 laps 5# AW on slider   SLR 2x10                 Therapeutic Activities         Education          Side steps  2# 2 laps  W/ hurdles and foam beams x 2 laps    1.5# 2 laps   STS         Step ups  Fwd 6\" 2x10  8\" 2x10  Fwd 6\" 1x10/  8\" 1x10    Lat 6\" 2x10   [place] carries    3 laps 10#  3 laps 10#   Modalities                            "

## 2025-01-14 NOTE — PROGRESS NOTES
"Daily Note     Today's date: 2025  Patient name: Chloé Anderson  : 1945  MRN: [id number]  Referring provider: Carline Howard DO  Dx:   Encounter Diagnosis     ICD-10-CM    1. Muscular deconditioning  R29.898       2. At risk for falls  Z91.81       3. Lumbar radiculopathy  M54.16           Start Time: 955  Stop Time:   Total time in clinic (min): 54 minutes    Subjective: Patient reports that she went to the gym at her community center and \"felt fantastic to be active\" and is proud that she was able to navigate through exercises independently, therefore thinks she is ready to discharge to [facility].      Objective: See treatment diary below      Assessment: Tolerated treatment well. Added many core, postural, and overall strengthening exercises as per patient request, with no adverse response or increases in sxs noted t/o. Patient demonstrated good form and body mechanics during fwd step ups today and was able to perform exercises without any pain or discomfort and displayed a steady and stable gait t/o. Patient  has been progressing very well in PT and has achieved various functional goals set at initial eval, therefore is ready to be discharged to [facility] as per primary PT. Updated and reviewed HEP and provided patient with a handout of exercises; patient verbalized understanding.      Plan: Continue per plan of care.          POC Expires Auth Status Start Date Expiration Date PT Visit Limit           Date 12/23 1/7 1/10 1/14 12/20   Used        Remaining           Diagnosis:  Balance/Deconditioning   Precautions:  See chart   Comparable signs 1) Walking  2) STS   Primary Impairments: 1) adverse neural tension  2) strength   Patient Goals Walk 1 mile   Manual Therapy 12/23 1/7 1/10 1/14 12/17 12/20   Hip distraction  AK                                   Re-evaluation    SP      Exercise Diary          Therapeutic Exercise         Bike/Nu Step 5' 5' 5' 5' 5'  5'   Hip 3 ways   Abd/ext/# 2x10 ea " "kike    Abd/ext/march 2x10 ea 1.5#   Nerve glide          Hip fall outs          [address] 10x10\"    10x10\"    Hip sags/ PPU      2x10 standing     SKC         Forward flexion         LAQ/SAQ         Marches  2# 2x10 kike    1.5# 2x10   Leg press  65# 2x15  65# 2x10 55# 3x10 60# 3x10   LTR x20        Open books x15                 Neuromuscular Re-education         Bridges  2x10        Wobble board         Baps board         EO/EC          Tandem walking  W/ Hurdles and foam beams x 2 laps       Split stance          Plate sliders     1 laps 10#  1 laps 5# AW on slider   SLR 2x10        [place] press    BTB 5\"x10 ea     Therapeutic Activities         Education          Side steps  2# 2 laps  W/ hurdles and foam beams x 2 laps    1.5# 2 laps   STS         Step ups  Fwd 6\" 2x10  Fwd 6\" 2x10  8\" 2x10  Fwd 6\" 1x10/  8\" 1x10    Lat 6\" 2x10   [place] carries      3 laps 10#   Modalities                            "

## 2025-01-16 DIAGNOSIS — I10 PRIMARY HYPERTENSION: ICD-10-CM

## 2025-01-16 RX ORDER — LISINOPRIL 20 MG/1
20 TABLET ORAL DAILY
Qty: 90 TABLET | Refills: 1 | Status: SHIPPED | OUTPATIENT
Start: 2025-01-16 | End: 2025-01-23

## 2025-01-17 ENCOUNTER — APPOINTMENT (OUTPATIENT)
Dept: PHYSICAL THERAPY | Facility: CLINIC | Age: 80
End: 2025-01-17
Payer: MEDICARE

## 2025-01-21 ENCOUNTER — HOSPITAL ENCOUNTER (EMERGENCY)
Facility: HOSPITAL | Age: 80
Discharge: LEFT BEFORE DISCHARGE | End: 2025-01-21
Payer: MEDICARE

## 2025-01-21 ENCOUNTER — NURSE TRIAGE (OUTPATIENT)
Age: 80
End: 2025-01-21

## 2025-01-21 ENCOUNTER — APPOINTMENT (OUTPATIENT)
Dept: PHYSICAL THERAPY | Facility: CLINIC | Age: 80
End: 2025-01-21
Payer: MEDICARE

## 2025-01-21 VITALS
DIASTOLIC BLOOD PRESSURE: 88 MMHG | HEART RATE: 69 BPM | TEMPERATURE: 98.4 F | OXYGEN SATURATION: 98 % | SYSTOLIC BLOOD PRESSURE: 134 MMHG | RESPIRATION RATE: 18 BRPM

## 2025-01-21 LAB
ALBUMIN SERPL BCG-MCNC: 4.3 G/DL (ref 3.5–5)
ALP SERPL-CCNC: 76 U/L (ref 34–104)
ALT SERPL W P-5'-P-CCNC: 12 U/L (ref 7–52)
ANION GAP SERPL CALCULATED.3IONS-SCNC: 9 MMOL/L (ref 4–13)
AST SERPL W P-5'-P-CCNC: 19 U/L (ref 13–39)
BASOPHILS # BLD AUTO: 0.05 THOUSANDS/ΜL (ref 0–0.1)
BASOPHILS NFR BLD AUTO: 1 % (ref 0–1)
BILIRUB SERPL-MCNC: 0.44 MG/DL (ref 0.2–1)
BUN SERPL-MCNC: 19 MG/DL (ref 5–25)
CALCIUM SERPL-MCNC: 9.4 MG/DL (ref 8.4–10.2)
CARDIAC TROPONIN I PNL SERPL HS: 5 NG/L (ref ?–50)
CHLORIDE SERPL-SCNC: 100 MMOL/L (ref 96–108)
CO2 SERPL-SCNC: 27 MMOL/L (ref 21–32)
CREAT SERPL-MCNC: 0.81 MG/DL (ref 0.6–1.3)
EOSINOPHIL # BLD AUTO: 0.15 THOUSAND/ΜL (ref 0–0.61)
EOSINOPHIL NFR BLD AUTO: 2 % (ref 0–6)
ERYTHROCYTE [DISTWIDTH] IN BLOOD BY AUTOMATED COUNT: 13.3 % (ref 11.6–15.1)
GFR SERPL CREATININE-BSD FRML MDRD: 68 ML/MIN/1.73SQ M
GLUCOSE SERPL-MCNC: 125 MG/DL (ref 65–140)
HCT VFR BLD AUTO: 40.1 % (ref 34.8–46.1)
HGB BLD-MCNC: 13.4 G/DL (ref 11.5–15.4)
IMM GRANULOCYTES # BLD AUTO: 0.03 THOUSAND/UL (ref 0–0.2)
IMM GRANULOCYTES NFR BLD AUTO: 0 % (ref 0–2)
LYMPHOCYTES # BLD AUTO: 1.27 THOUSANDS/ΜL (ref 0.6–4.47)
LYMPHOCYTES NFR BLD AUTO: 17 % (ref 14–44)
MCH RBC QN AUTO: 31.2 PG (ref 26.8–34.3)
MCHC RBC AUTO-ENTMCNC: 33.4 G/DL (ref 31.4–37.4)
MCV RBC AUTO: 94 FL (ref 82–98)
MONOCYTES # BLD AUTO: 0.57 THOUSAND/ΜL (ref 0.17–1.22)
MONOCYTES NFR BLD AUTO: 8 % (ref 4–12)
NEUTROPHILS # BLD AUTO: 5.35 THOUSANDS/ΜL (ref 1.85–7.62)
NEUTS SEG NFR BLD AUTO: 72 % (ref 43–75)
NRBC BLD AUTO-RTO: 0 /100 WBCS
PLATELET # BLD AUTO: 222 THOUSANDS/UL (ref 149–390)
PMV BLD AUTO: 10.1 FL (ref 8.9–12.7)
POTASSIUM SERPL-SCNC: 4.2 MMOL/L (ref 3.5–5.3)
PROT SERPL-MCNC: 7.3 G/DL (ref 6.4–8.4)
RBC # BLD AUTO: 4.29 MILLION/UL (ref 3.81–5.12)
SODIUM SERPL-SCNC: 136 MMOL/L (ref 135–147)
WBC # BLD AUTO: 7.42 THOUSAND/UL (ref 4.31–10.16)

## 2025-01-21 PROCEDURE — 84484 ASSAY OF TROPONIN QUANT: CPT

## 2025-01-21 PROCEDURE — 80053 COMPREHEN METABOLIC PANEL: CPT

## 2025-01-21 PROCEDURE — 36415 COLL VENOUS BLD VENIPUNCTURE: CPT

## 2025-01-21 PROCEDURE — 85025 COMPLETE CBC W/AUTO DIFF WBC: CPT

## 2025-01-21 PROCEDURE — 93005 ELECTROCARDIOGRAM TRACING: CPT

## 2025-01-21 NOTE — ED NOTES
PIV placed to facilitate activation of FNP for CC. Informed patient to alert staff of desire to leave so that PIV may be removed prior to departure. Patient agreeable and verbalized understanding of same.      Wendi Alfonso RN  01/21/25 [id number]

## 2025-01-21 NOTE — TELEPHONE ENCOUNTER
"Reason for Conversation: Pt was last seen on 11/6/2024. Received call from pt stating starting about one week ago, she noticed herself getting chest pressure across the center of her chest lasting an hour or two. She stated there is no pain or palpitations however feels a tightness in her chest. She stated she felt this same chest pressure back in September and went to the ED for it. She stated her BP meds were then increased and her symptoms resolved. She stated she will feel this chest pressure when it gets closer to the time she needs to take her BP meds. Denies chest pain, palpitations, dizziness, n/v, fever, chills, nor cough . Denies the pressure radiating elsewhere. States when the pressure is present, sometimes feels she cannot take a deep breath. She does take her BP at times. Please see log below. Today her BP is reading 201/120, HR 61. When attempting to take the BP again, she keeps getting an \"error\" signal.     Advised pt due to the chest pressure and high BP, she should be evaluated in the ED. She voiced understanding and stated will go to [facility].     She did also request to have a follow up appt after she goes to the ED which I made for her for 1/23/2025. Advised pt to keep the office updated with what happens at the ED if any changes need to be made. She voiced understanding. No further questions.     Sending to Dr. Clarke as an fyi.     VS/Weight: (Note: Please include date/time vitals/weight were measured)      1/1: 150/86, 56  1/5: 152/77, 58  1/10: 183/93, 49  1/13: 153/84, 55  1/15: 157/86, 57  1/16: 154/83, 55  1/21: 178/98, 55            201/120, 61    Pain: Yes     -Pain Score: sometimes 2-3/10, sometimes 4-5/10    -Location: across center of chest      -Radiation: denies     -Duration: an hour or two    -Type (tightness, crushing etc.): pressure / tightness. Denies pain    Risk Factors: Hypertension    Recent relevant testing and date of testing: echo 9/6/2024    Medication: " "lisinopril 20mg daily, bystolic 20mg daily     Upcoming Office Visit: Yes 1/23/2025    Last Office Visit: 11/6/2025        Reason for Disposition   Chest pain or 'angina' comes and goes and is happening more often (increasing in frequency) or getting worse (increasing in severity) (Exception: Chest pains that last only a few seconds.)    Answer Assessment - Initial Assessment Questions  1. LOCATION: \"Where does it hurt?\"        Describes as a chest pressure / tightness across her chest where breasts are. Had it before in September, went to the ED and was instructed to increase her meds dosage and it relieved the problem. Feels like a weight is on her chest when laying down. Denies any pain nor palpitations    2. RADIATION: \"Does the pain go anywhere else?\" (e.g., into neck, jaw, arms, back)      Denies   3. ONSET: \"When did the chest pain begin?\" (Minutes, hours or days)       One week ago   4. PATTERN: \"Does the pain come and go, or has it been constant since it started?\"  \"Does it get worse with exertion?\"       Comes and goes  5. DURATION: \"How long does it last\" (e.g., seconds, minutes, hours)      Sometimes an hour or two. Will be relived by rest and deep breathing. Also states taking her next dose of medication helps to relieve the pressure   6. SEVERITY: \"How bad is the pain?\"  (e.g., Scale 1-10; mild, moderate, or severe)      States can work through the pressure. Sometimes it will be a 2 or 3/10. Sometimes a 4-5/10  7. CARDIAC RISK FACTORS: \"Do you have any history of heart problems or risk factors for heart disease?\" (e.g., angina, prior heart attack; diabetes, high blood pressure, high cholesterol, smoker, or strong family history of heart disease)      Pulmonary HTN   8. PULMONARY RISK FACTORS: \"Do you have any history of lung disease?\"  (e.g., blood clots in lung, asthma, emphysema, birth control pills)      denies  9. CAUSE: \"What do you think is causing the chest pain?\"      Maybe need a higher dose " "of meds  10. OTHER SYMPTOMS: \"Do you have any other symptoms?\" (e.g., dizziness, nausea, vomiting, sweating, fever, difficulty breathing, cough)        Denies chest pain, palpitations, dizziness, n/v, fever, chills, nor cough . States when the pressure is present, sometimes feels she cannot take a deep breath.    Protocols used: Chest Pain-Adult-[place]    "

## 2025-01-22 LAB
ATRIAL RATE: 67 BPM
P AXIS: 53 DEGREES
PR INTERVAL: 168 MS
QRS AXIS: -15 DEGREES
QRSD INTERVAL: 78 MS
QT INTERVAL: 414 MS
QTC INTERVAL: 437 MS
T WAVE AXIS: 24 DEGREES
VENTRICULAR RATE: 67 BPM

## 2025-01-22 PROCEDURE — 93010 ELECTROCARDIOGRAM REPORT: CPT | Performed by: STUDENT IN AN ORGANIZED HEALTH CARE EDUCATION/TRAINING PROGRAM

## 2025-01-23 ENCOUNTER — OFFICE VISIT (OUTPATIENT)
Dept: CARDIOLOGY CLINIC | Facility: CLINIC | Age: 80
End: 2025-01-23
Payer: MEDICARE

## 2025-01-23 VITALS
WEIGHT: 132 LBS | HEART RATE: 69 BPM | OXYGEN SATURATION: 99 % | SYSTOLIC BLOOD PRESSURE: 130 MMHG | TEMPERATURE: 98.5 F | BODY MASS INDEX: 24.92 KG/M2 | DIASTOLIC BLOOD PRESSURE: 70 MMHG | HEIGHT: 61 IN

## 2025-01-23 DIAGNOSIS — I10 PRIMARY HYPERTENSION: ICD-10-CM

## 2025-01-23 DIAGNOSIS — R07.89 CHEST PRESSURE: ICD-10-CM

## 2025-01-23 DIAGNOSIS — E78.5 DYSLIPIDEMIA: ICD-10-CM

## 2025-01-23 DIAGNOSIS — I27.20 PULMONARY HYPERTENSION (HCC): ICD-10-CM

## 2025-01-23 PROCEDURE — 99214 OFFICE O/P EST MOD 30 MIN: CPT

## 2025-01-23 RX ORDER — LISINOPRIL 40 MG/1
40 TABLET ORAL DAILY
Qty: 90 TABLET | Refills: 1 | Status: SHIPPED | OUTPATIENT
Start: 2025-01-23

## 2025-01-23 NOTE — PROGRESS NOTES
Chloé Anderson  1945  [id number]  [facility] [address] [phone]    1. Primary hypertension  lisinopril (ZESTRIL) 40 mg tablet    Basic metabolic panel      2. Pulmonary hypertension (HCC)        3. Dyslipidemia        4. Chest pressure            Summary/Discussion:  Chest pressure:  - s/p ED visit on 1/21/2025 for symptoms of shortness of breath and chest pressure  initial labs unremarkable  troponin x 1 negative  presenting EKG demonstrating sinus rhythm without any acute ischemic changes   - reports recent symptoms of chest pressure in which she describes as if a rubber band is around her chest. Her symptoms resolve roughly 1 hour after she takes an additional dose of her lisinopril. She is relatively active for her age and has not noticed a significant change in her functional capacity. During our visit today, her symptoms were able to be reproduced on palpitation-- suspect musculoskeletal although ? if her elevated blood pressures are contributing to the same   noted to have some chest pressure when her blood pressures were high during office visit in 11/2024  - recommend increasing her lisinopril as noted below  - if she continues to have symptoms despite blood pressure control would consider ischemic testing with no recent stress testing. She does have risk factors for CAD   - continue beta blocker  - counseled regarding high risk features for CAD and have emphasized need to monitor for same    Mitral valve regurgitation  - moderate by echo in 9/2024  annual echo already scheduled for 11/3/2025  - volume status appears stable on exam.  Currently not requiring any diuretic therapy  - continue surveillance management and blood pressure control    Pulmonary hypertension   - mild to moderate by echo in 9/2024  - suspected to be secondary to diastolic dysfunction and her valve disease  - volume status as noted above  - continue blood  pressure control with lisinopril 20 mg daily and Bystolic 20 mg daily    Hypertension  - stable today 130/70 although she does report elevated blood pressures at home. Recorded blood pressures reviewed today with evidence of SBP ranging from 140's-190's  - recommend increasing her lisinopril to 40 mg daily. She does have intermittent asymptomatic sinus bradycardia so limited in further up titration to her beta-blocker  repeat BMP  - 1 week follow up for a nurse visit to reassess blood pressure on increased dose of lisinopril   - lifestyle modification   - close blood pressure monitoring     Dyslipidemia:  - Lipid Profile:    Latest Reference Range & Units 09/27/24 09:49   Cholesterol See Comment mg/dL 155   Triglycerides See Comment mg/dL 81   HDL >=50 mg/dL 46 (L)   Non-HDL Cholesterol mg/dl 109   LDL Calculated 0 - 100 mg/dL 93   LDL CHOLESTEROL DIRECT 0 - 100 mg/dl 112 (H)   - not currently on any lipid-lower therapy  - encouraged low cholesterol, mediterranean diet, and annual lipid follow up    Interval History: Chloé Anderson is a 79 y.o. year old female with history mentioned in problem list who presents to the office today for a follow up s/p ED visit on 1/21/2025.    Today, she reports continued symptoms of chest pressure in which she describes as if a rubber band is around her chest. Her symptoms resolve roughly 1 hour after she takes an additional dose of her lisinopril. She is relatively active for her age and has not noticed a significant change in her functional capacity. She denies lower extremity edema, orthopnea, and PND. She denies lightheadedness, dizziness, and syncope. She denies palpitations.        She will RTO in 1 week for a nurse visit to recheck her blood pressure. She will call with any concerns.       Medical Problems       Problem List       GERD (gastroesophageal reflux disease)    Hypertension    Anxiety    Eczema    Left atrial enlargement    Mitral valve sclerosis    Tricuspid  regurgitation    Pulmonary hypertension (HCC)    Hyperlipidemia    Baker's cyst    Osteoarthritis    Age-related osteoporosis without current pathological fracture    Grade II diastolic dysfunction    Aortic regurgitation    Mitral regurgitation    Stable branch retinal vein occlusion of left eye    Hypertensive retinopathy of both eyes    Posterior vitreous detachment of left eye    White without pressure of peripheral retina of both eyes    Vitreous debris    Vitreous syneresis of right eye    Hypomagnesemia    Hyponatremia        Past Medical History:   Diagnosis Date    Anxiety     Aortic regurgitation 09/06/2024    Baker's cyst     Eczema 08/12/2024    GERD (gastroesophageal reflux disease)     Grade II diastolic dysfunction 09/06/2024    Hyperlipidemia     Hypertension     Hypertensive retinopathy of both eyes 10/02/2024    Hypomagnesemia 10/02/2024    Hyponatremia 10/02/2024    Left atrial enlargement     Mitral regurgitation 09/06/2024    Mitral valve sclerosis     Osteoarthritis     Osteoporosis     Overweight 08/12/2024    Posterior vitreous detachment of left eye 10/02/2024    Pulmonary hypertension (HCC)     Stable branch retinal vein occlusion of left eye 10/02/2024    Tricuspid regurgitation 09/06/2024    Vitreous debris 10/02/2024    Vitreous syneresis of right eye 10/02/2024    White without pressure of peripheral retina of both eyes 10/02/2024     Social History     Socioeconomic History    Marital status:      Spouse name: Not on file    Number of children: 1    Years of education: Not on file    Highest education level: Not on file   Occupational History    Occupation: Retired     Comment: Teacher   Tobacco Use    Smoking status: Never     Passive exposure: Never    Smokeless tobacco: Never    Tobacco comments:     Never smoked   Vaping Use    Vaping status: Never Used   Substance and Sexual Activity    Alcohol use: Not Currently    Drug use: Never    Sexual activity: Not Currently   Other  Topics Concern    Not on file   Social History Narrative        Lives alone    1 Child - 1 Daughter (Has 1 Adopted son)    Retired - Teacher     Social Drivers of Health     Financial Resource Strain: Not on file   Food Insecurity: Food Insecurity Present (2024)    Nursing - Inadequate Food Risk Classification     Worried About Running Out of Food in the Last Year: Never true     Ran Out of Food in the Last Year: Sometimes true     Ran Out of Food in the Last Year: Not on file   Transportation Needs: No Transportation Needs (2024)    PRAPARE - Transportation     Lack of Transportation (Medical): No     Lack of Transportation (Non-Medical): No   Physical Activity: Not on file   Stress: Not on file   Social Connections: Not on file   Intimate Partner Violence: Not on file   Housing Stability: High Risk (2024)    Housing Stability Vital Sign     Unable to Pay for Housing in the Last Year: No     Number of Times Moved in the Last Year: 3     Homeless in the Last Year: No      Family History   Problem Relation Age of Onset    Heart failure Mother         passed at 90    Cataracts Mother     Anxiety disorder Mother     Alcohol abuse Father     Stroke Father     Heart attack Father         passed at 69    Heart disease Father     Cancer Sister         passed at 78    Anxiety disorder Sister     Obesity Brother         passed at 69     Past Surgical History:   Procedure Laterality Date    CATARACT EXTRACTION Bilateral 2010     SECTION  1972    EGD  2022,     LAPAROSCOPIC TOTAL HYSTERECTOMY  2016    Uterine Prolapse    STRABISMUS SURGERY Bilateral 1955       Current Outpatient Medications:     Calcium 500 MG tablet, Take 500 mg by mouth in the morning, Disp: , Rfl:     Cholecalciferol (Vitamin D3) 50 MCG ( UT) TABS, Take 1 tablet by mouth daily, Disp: , Rfl:     famotidine (PEPCID) 20 mg tablet, TAKE 1 TABLET BY MOUTH 2 TIMES A DAY AS NEEDED FOR HEARTBURN., Disp: 180  "tablet, Rfl: 1    lisinopril (ZESTRIL) 40 mg tablet, Take 1 tablet (40 mg total) by mouth daily, Disp: 90 tablet, Rfl: 1    Magnesium Bisglycinate 100 MG TABS, Take 400 mg by mouth in the morning, Disp: , Rfl:     mometasone (ELOCON) 0.1 % cream, Apply topically 2 (two) times a day as needed (Ear Eczema), Disp: 15 g, Rfl: 0    nebivolol (BYSTOLIC) 20 MG tablet, TAKE 1 TABLET BY MOUTH EVERY DAY, Disp: 90 tablet, Rfl: 1    Sodium Fluoride 5000 PPM 1.1 % GEL, Take 1 Application by mouth daily at bedtime, Disp: , Rfl:     triamcinolone (KENALOG) 0.1 % cream, Apply 1 Application topically 2 (two) times a day as needed for rash, Disp: , Rfl:   Allergies   Allergen Reactions    Amlodipine Shortness Of Breath     Heart pressure    Telmisartan Shortness Of Breath     Heart pressure    Codeine Nausea Only and Vomiting    Bactrim [Sulfamethoxazole-Trimethoprim] GI Intolerance    Hydralazine Hives    Losartan GI Intolerance    Hydrocodone GI Intolerance    Indapamide Other (See Comments)     Pt stated that her electrolyte went down    Oxycodone GI Intolerance and Vomiting       Labs:     Chemistry        Component Value Date/Time    K 4.2 01/21/2025 1805     01/21/2025 1805    CO2 27 01/21/2025 1805    BUN 19 01/21/2025 1805    CREATININE 0.81 01/21/2025 1805        Component Value Date/Time    CALCIUM 9.4 01/21/2025 1805    ALKPHOS 76 01/21/2025 1805    AST 19 01/21/2025 1805    ALT 12 01/21/2025 1805            No results found for: \"CHOL\"  Lab Results   Component Value Date    HDL 46 (L) 09/27/2024     Lab Results   Component Value Date    LDLCALC 93 09/27/2024     Lab Results   Component Value Date    TRIG 81 09/27/2024     No results found for: \"CHOLHDL\"    Imaging: No results found.    ECG:  n/a    Review of Systems   Cardiovascular:  Positive for chest pain (pressure).   Respiratory:  Positive for shortness of breath.    All other systems reviewed and are negative.      Vitals:    01/23/25 1422   BP: 130/70 " "  Pulse: 69   Temp: 98.5 °F (36.9 °C)   SpO2: 99%     Vitals:    01/23/25 1422   Weight: 59.9 kg (132 lb)     Height: 5' 1\" (154.9 cm)   Body mass index is 24.94 kg/m².    Physical Exam  Vitals and nursing note reviewed.   Constitutional:       Appearance: Normal appearance.   HENT:      Head: Normocephalic.      Nose: Nose normal.      Mouth/Throat:      Mouth: Mucous membranes are moist.      Pharynx: Oropharynx is clear.   Cardiovascular:      Rate and Rhythm: Normal rate and regular rhythm.      Pulses: Normal pulses.      Heart sounds: Normal heart sounds. No murmur heard.  Pulmonary:      Effort: Pulmonary effort is normal.      Breath sounds: Normal breath sounds.   Musculoskeletal:         General: Normal range of motion.      Cervical back: Normal range of motion.      Right lower leg: No edema.      Left lower leg: No edema.   Skin:     General: Skin is warm and dry.   Neurological:      Mental Status: She is alert.   Psychiatric:         Mood and Affect: Mood normal.         Behavior: Behavior normal.        "

## 2025-01-24 ENCOUNTER — APPOINTMENT (OUTPATIENT)
Dept: PHYSICAL THERAPY | Facility: CLINIC | Age: 80
End: 2025-01-24
Payer: MEDICARE

## 2025-01-28 ENCOUNTER — APPOINTMENT (OUTPATIENT)
Dept: PHYSICAL THERAPY | Facility: CLINIC | Age: 80
End: 2025-01-28
Payer: MEDICARE

## 2025-01-30 ENCOUNTER — CLINICAL SUPPORT (OUTPATIENT)
Dept: CARDIOLOGY CLINIC | Facility: CLINIC | Age: 80
End: 2025-01-30

## 2025-01-30 VITALS
BODY MASS INDEX: 24.92 KG/M2 | SYSTOLIC BLOOD PRESSURE: 162 MMHG | WEIGHT: 132 LBS | OXYGEN SATURATION: 98 % | HEIGHT: 61 IN | TEMPERATURE: 97.5 F | HEART RATE: 62 BPM | DIASTOLIC BLOOD PRESSURE: 82 MMHG

## 2025-01-30 DIAGNOSIS — R07.89 CHEST PRESSURE: Primary | ICD-10-CM

## 2025-01-30 DIAGNOSIS — I10 PRIMARY HYPERTENSION: ICD-10-CM

## 2025-01-30 PROCEDURE — NURSE

## 2025-01-30 NOTE — PROGRESS NOTES
Pt came in today for blood pressure check. Pt presents well but has complaint of chest pressure at a discomfort level of 2. Pt reports the chest pressure is intermittent and lasts for quite a while. Describes sensation as a wide band of compression across chest. At times, pt has shortness of breath resulting from chest pressure. Pt believes her hyponatremia may be associated with the chest pressure.    Pt is compliant with medications and brought in [employer] blood pressure cuff from home to measure discrepancy between manual blood pressure and automatic blood pressure readings. It found that pt's automatic blood pressure cuff reads about 20 points higher systolically and 15 points higher diastolically. Pt kept log of blood pressure taken at home, which ran much lower than in office, ranging from 160-140/90-80.    Pt spoke with Susan and it was decided to continue current regimen of medications and to report to Susan if blood pressures at home are consistently above 140 systolic. Nuclear med stress test was also ordered by Susan for pt to rule out any blockages. Reviewed directions from Susan with pt and pt understood.

## 2025-01-31 ENCOUNTER — APPOINTMENT (OUTPATIENT)
Dept: PHYSICAL THERAPY | Facility: CLINIC | Age: 80
End: 2025-01-31
Payer: MEDICARE

## 2025-02-03 ENCOUNTER — APPOINTMENT (OUTPATIENT)
Dept: LAB | Facility: CLINIC | Age: 80
End: 2025-02-03
Payer: MEDICARE

## 2025-02-03 ENCOUNTER — RESULTS FOLLOW-UP (OUTPATIENT)
Dept: FAMILY MEDICINE CLINIC | Facility: CLINIC | Age: 80
End: 2025-02-03

## 2025-02-03 DIAGNOSIS — E66.3 OVERWEIGHT: ICD-10-CM

## 2025-02-03 DIAGNOSIS — E83.42 HYPOMAGNESEMIA: ICD-10-CM

## 2025-02-03 DIAGNOSIS — M81.0 OSTEOPOROSIS, UNSPECIFIED OSTEOPOROSIS TYPE, UNSPECIFIED PATHOLOGICAL FRACTURE PRESENCE: ICD-10-CM

## 2025-02-03 DIAGNOSIS — E78.49 OTHER HYPERLIPIDEMIA: ICD-10-CM

## 2025-02-03 DIAGNOSIS — I10 PRIMARY HYPERTENSION: ICD-10-CM

## 2025-02-03 DIAGNOSIS — E87.1 HYPONATREMIA: ICD-10-CM

## 2025-02-03 LAB — TSH SERPL DL<=0.05 MIU/L-ACNC: 1.52 UIU/ML (ref 0.45–4.5)

## 2025-02-03 PROCEDURE — 36415 COLL VENOUS BLD VENIPUNCTURE: CPT

## 2025-02-03 PROCEDURE — 84443 ASSAY THYROID STIM HORMONE: CPT

## 2025-02-03 PROCEDURE — 80053 COMPREHEN METABOLIC PANEL: CPT

## 2025-02-03 PROCEDURE — 83721 ASSAY OF BLOOD LIPOPROTEIN: CPT

## 2025-02-03 PROCEDURE — 83735 ASSAY OF MAGNESIUM: CPT

## 2025-02-04 LAB
ALBUMIN SERPL BCG-MCNC: 4.4 G/DL (ref 3.5–5)
ALP SERPL-CCNC: 67 U/L (ref 34–104)
ALT SERPL W P-5'-P-CCNC: 16 U/L (ref 7–52)
ANION GAP SERPL CALCULATED.3IONS-SCNC: 20 MMOL/L (ref 4–13)
AST SERPL W P-5'-P-CCNC: 31 U/L (ref 13–39)
BILIRUB SERPL-MCNC: 0.7 MG/DL (ref 0.2–1)
BUN SERPL-MCNC: 23 MG/DL (ref 5–25)
CALCIUM SERPL-MCNC: 9.6 MG/DL (ref 8.4–10.2)
CHLORIDE SERPL-SCNC: 106 MMOL/L (ref 96–108)
CO2 SERPL-SCNC: 18 MMOL/L (ref 21–32)
CREAT SERPL-MCNC: 1.03 MG/DL (ref 0.6–1.3)
GFR SERPL CREATININE-BSD FRML MDRD: 51 ML/MIN/1.73SQ M
GLUCOSE P FAST SERPL-MCNC: 88 MG/DL (ref 65–99)
LDLC SERPL DIRECT ASSAY-MCNC: 125 MG/DL (ref 0–100)
MAGNESIUM SERPL-MCNC: 2.1 MG/DL (ref 1.9–2.7)
POTASSIUM SERPL-SCNC: 5 MMOL/L (ref 3.5–5.3)
PROT SERPL-MCNC: 7 G/DL (ref 6.4–8.4)
SODIUM SERPL-SCNC: 144 MMOL/L (ref 135–147)

## 2025-02-05 ENCOUNTER — OFFICE VISIT (OUTPATIENT)
Dept: FAMILY MEDICINE CLINIC | Facility: CLINIC | Age: 80
End: 2025-02-05
Payer: MEDICARE

## 2025-02-05 VITALS
HEIGHT: 61 IN | DIASTOLIC BLOOD PRESSURE: 90 MMHG | TEMPERATURE: 98.1 F | HEART RATE: 60 BPM | BODY MASS INDEX: 24.92 KG/M2 | SYSTOLIC BLOOD PRESSURE: 170 MMHG | OXYGEN SATURATION: 99 % | WEIGHT: 132 LBS

## 2025-02-05 DIAGNOSIS — I27.20 PULMONARY HYPERTENSION (HCC): ICD-10-CM

## 2025-02-05 DIAGNOSIS — E78.5 DYSLIPIDEMIA: ICD-10-CM

## 2025-02-05 DIAGNOSIS — I07.1 TRICUSPID VALVE INSUFFICIENCY, UNSPECIFIED ETIOLOGY: ICD-10-CM

## 2025-02-05 DIAGNOSIS — M81.0 AGE-RELATED OSTEOPOROSIS WITHOUT CURRENT PATHOLOGICAL FRACTURE: ICD-10-CM

## 2025-02-05 DIAGNOSIS — I10 PRIMARY HYPERTENSION: Primary | ICD-10-CM

## 2025-02-05 DIAGNOSIS — K21.9 GASTROESOPHAGEAL REFLUX DISEASE, UNSPECIFIED WHETHER ESOPHAGITIS PRESENT: ICD-10-CM

## 2025-02-05 DIAGNOSIS — E87.1 HYPONATREMIA: ICD-10-CM

## 2025-02-05 DIAGNOSIS — I51.7 LEFT ATRIAL ENLARGEMENT: ICD-10-CM

## 2025-02-05 DIAGNOSIS — F41.9 ANXIETY: ICD-10-CM

## 2025-02-05 DIAGNOSIS — E83.42 HYPOMAGNESEMIA: ICD-10-CM

## 2025-02-05 DIAGNOSIS — H34.8322 STABLE BRANCH RETINAL VEIN OCCLUSION OF LEFT EYE: ICD-10-CM

## 2025-02-05 DIAGNOSIS — L30.9 ECZEMA, UNSPECIFIED TYPE: ICD-10-CM

## 2025-02-05 DIAGNOSIS — I05.8 MITRAL VALVE SCLEROSIS: ICD-10-CM

## 2025-02-05 PROCEDURE — 99215 OFFICE O/P EST HI 40 MIN: CPT | Performed by: FAMILY MEDICINE

## 2025-02-05 PROCEDURE — G2211 COMPLEX E/M VISIT ADD ON: HCPCS | Performed by: FAMILY MEDICINE

## 2025-02-05 RX ORDER — ATORVASTATIN CALCIUM 20 MG/1
20 TABLET, FILM COATED ORAL
Qty: 30 TABLET | Refills: 8 | Status: SHIPPED | OUTPATIENT
Start: 2025-02-05

## 2025-02-05 RX ORDER — NEBIVOLOL 20 MG/1
20 TABLET ORAL DAILY
Qty: 90 TABLET | Refills: 2 | Status: SHIPPED | OUTPATIENT
Start: 2025-02-05

## 2025-02-05 NOTE — RESULT ENCOUNTER NOTE
Unstable labs - will review with patient at upcoming appointment.    Hyperlipidemia - LDL not at goal s/p retinal vein occlusion.  To consider statin?

## 2025-02-05 NOTE — PROGRESS NOTES
Assessment/Plan:  Assessment & Plan  Primary hypertension  Controlled at home, elevated in office today.  Management per Cardio.  S/p recent Increase Lisinopril 40mg QD.  Continue Bystolic 20mg QD.  Continue to monitor on current regimen.  Check blood pressure outside of office.  Recommend lifestyle modifications.    Orders:  •  nebivolol (BYSTOLIC) 20 MG tablet; Take 1 tablet (20 mg total) by mouth daily    Dyslipidemia  Uncontrolled.  LDL not goal <70 s/p retinal vein occlusion.  Start Lipitor 20mg QHS.  Recommend lifestyle modifications.    Orders:  •  atorvastatin (LIPITOR) 20 mg tablet; Take 1 tablet (20 mg total) by mouth daily at bedtime  •  Comprehensive metabolic panel; Future  •  TSH, 3rd generation with Free T4 reflex; Future  •  LDL cholesterol, direct; Future    Anxiety  Stable s meds and counseling.      Orders:  •  TSH, 3rd generation with Free T4 reflex; Future    Gastroesophageal reflux disease, unspecified whether esophagitis present  Stable on Pepcid 20mg BID PRN.  Pending GI consult as overdue for colonoscopy.  Watch GERD diet.       Orders:  •  Magnesium; Future    Eczema, unspecified type  Stable on Rx topical steroids.         Hypomagnesemia  Stable.  Continue Magnesium glycinate 400mg 1 pill by mouth daily.   Orders:  •  Comprehensive metabolic panel; Future  •  Magnesium; Future    Hyponatremia  Stable.  S/p Renal consult at previous residence.  Continue fluid restriction and continue electrolyte powder.     Orders:  •  Comprehensive metabolic panel; Future    Left atrial enlargement  Management per Cardio.         Mitral valve sclerosis  Management per Cardio.         Tricuspid valve insufficiency, unspecified etiology  Management per Cardio.         Pulmonary hypertension (HCC)  Management per Cardio.         Age-related osteoporosis without current pathological fracture  Management per Endo.  Dexa is up to date.  No meds currently.         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      Orders:  •  atorvastatin (LIPITOR) 20 mg tablet; Take 1 tablet (20 mg total) by mouth daily at bedtime  •  LDL cholesterol, direct; Future          Return in about 4 months (around 6/5/2025) for 40min - 4mo - HTN, Anxiety, CKD, GERD, Labs.      Future Appointments   Date Time Provider Department Center   4/30/2025 11:40 AM DION Rothman [facility]   6/9/2025  9:30 AM EA DEXA 1 EA DEXA [facility]   6/10/2025 10:00 AM Carline Howard DO  [facility]   6/17/2025 10:00 AM Michelle Buchanan MD [facility]   7/11/2025 10:30 AM CESAR Castanon [facility]   11/3/2025 11:00 AM EA ECHO 1 EA CAR NI [facility]   11/19/2025 11:40 AM Vince Merritt MD [facility]        Subjective:     Chloé is a 79 y.o. female who presents today for a follow-up on her chronic medical conditions.        HPI:  Chief Complaint   Patient presents with   • Follow-up     No questions or concerns.     -- Above per clinical staff and reviewed. --      HPI      Today:      Return in 4 months (on 2/2/2025) for 40min - 4mo - HTN, Anxiety, CKD, GERD, Labs.     4mo OV    Overweight - Watching diet.  +Exercise - Gym 20-30 minutes, for 2-3 times per week.        HTN - Management per Cardio consult Dr. Merritt - Estefany appt 4/25.  On increased Lisinopril 40mg QD x 2 weeks and Bystolic 20mg QD.  No longer has recurrent chest tightness and SOB.  BP check outside of office on arm cuff 130/75, pulse 56.  D/C Metoprolol ER 25mg QD due to bradycardia.       H/O Hyponatremia - Last saw Renal 5/24 prior to move - did not advise Renal F/U.  Had fluid restriction, but patient has not been monitoring.  She has been drinking electrolete powder daily.       Left Atrial Enlargement / Mitral Valve Sclerosis / Tricuspid Regurgitation / Pulmonary HTN - Last Echo 9/6/24, due to repeat 9/25.  Management per Cardio Dr. Merritt - Next appt 4/25.         Hyperlipidemia - No statin  previously.      H/O Left Retinal Vein Occlusion -  Pending Retinal consult c Dr. Alicea Spring 2025.       Anxiety - Mood is stable.  She paints daily.  Good social supports.  No SI/HI/AH/VH.  D/C Valium 5mg BID PRN per previous PCP - last dose?, taking?  Using for flying, pre-op anxiety.  Was previously Rx 15 pills per year by prior PCP.          PHQ-2/9 Depression Screening    Little interest or pleasure in doing things: 0 - not at all  Feeling down, depressed, or hopeless: 0 - not at all  Trouble falling or staying asleep, or sleeping too much: 0 - not at all  Feeling tired or having little energy: 0 - not at all  Poor appetite or overeatin - not at all  Feeling bad about yourself - or that you are a failure or have let yourself or your family down: 0 - not at all  Trouble concentrating on things, such as reading the newspaper or watching television: 0 - not at all  Moving or speaking so slowly that other people could have noticed. Or the opposite - being so fidgety or restless that you have been moving around a lot more than usual: 0 - not at all  Thoughts that you would be better off dead, or of hurting yourself in some way: 0 - not at all  PHQ-2 Score: 0  PHQ-2 Interpretation: Negative depression screen  PHQ-9 Score: 0  PHQ-9 Interpretation: No or Minimal depression       ESTEFANI-7 Flowsheet Screening    Flowsheet Row Most Recent Value   Over the last two weeks, how often have you been bothered by the following problems?     Feeling nervous, anxious, or on edge 0   Not being able to stop or control worrying 0   Worrying too much about different things 0   Trouble relaxing  0   Being so restless that it's hard to sit still 0   Becoming easily annoyed or irritable  0   Feeling afraid as if something awful might happen 0   How difficult have these problems made it for you to do your work, take care of things at home, or get along with other people?  Not difficult at all   ESTEFANI Score  0               GERD /  H/O Food Impaction - Referred to GI 8/12/24 - No appt scheduled yet.  Not using Pepcid 20mg TID PRN.  Self-weaned Protonix 40mg QD s issue.  Last EGD 2022- was supped to be done 8/23, but D/C due to uncontrolled BP.   Overdue for colonosocpy.  No other GERD meds previously?  Watching GERD diet.       Osteoporosis - Management per Endo Dr. Buchanan - Next appt 6/25.  Last Dexa 6/5/23.  Patient has not been on meds previously - Prolia was advised.       Eczema - Referred to Derm 8/12/24 - Pending consult c Dr. Sheets 6/25.  On Elocon BID PRN ear eczema.  Uses twice yearly.        Retinal Issues - Pending Retinal consult c Dr. Alicea Spring 2025.      Recurrent UTI - Management per Uro Justin Amor PA-C - Next appt 7/25.          Reviewed:  Labs 2/3/25, Echo 11/21/22, Stress Test 12/30/22, Cardio 1/23/25, Uro 1/3/25, Endo 11/21/24     No Gyn.  S/p Lap Total Hysterectomy due to Uterine Prolapase.      The following portions of the patient's history were reviewed and updated as appropriate: allergies, current medications, past family history, past medical history, past social history, past surgical history and problem list.      Review of Systems   Constitutional:  Negative for appetite change, chills, diaphoresis, fatigue and fever.   Respiratory:  Negative for chest tightness and shortness of breath.    Cardiovascular:  Negative for chest pain.   Gastrointestinal:  Negative for abdominal pain, blood in stool, diarrhea, nausea and vomiting.   Genitourinary:  Negative for dysuria.        Current Outpatient Medications   Medication Sig Dispense Refill   • atorvastatin (LIPITOR) 20 mg tablet Take 1 tablet (20 mg total) by mouth daily at bedtime 30 tablet 8   • Calcium 500 MG tablet Take 500 mg by mouth in the morning     • Cholecalciferol (Vitamin D3) 50 MCG (2000 UT) TABS Take 1 tablet by mouth daily     • famotidine (PEPCID) 20 mg tablet TAKE 1 TABLET BY MOUTH 2 TIMES A DAY AS NEEDED FOR HEARTBURN. 180 tablet  "1   • lisinopril (ZESTRIL) 40 mg tablet Take 1 tablet (40 mg total) by mouth daily 90 tablet 1   • Magnesium Bisglycinate 100 MG TABS Take 400 mg by mouth in the morning     • mometasone (ELOCON) 0.1 % cream Apply topically 2 (two) times a day as needed (Ear Eczema) 15 g 0   • nebivolol (BYSTOLIC) 20 MG tablet Take 1 tablet (20 mg total) by mouth daily 90 tablet 2   • Sodium Fluoride 5000 PPM 1.1 % GEL Take 1 Application by mouth daily at bedtime     • triamcinolone (KENALOG) 0.1 % cream Apply 1 Application topically 2 (two) times a day as needed for rash       No current facility-administered medications for this visit.       Objective:  /90 (Patient Position: Sitting, Cuff Size: Standard)   Pulse 60   Temp 98.1 °F (36.7 °C) (Temporal)   Ht 5' 1\" (1.549 m)   Wt 59.9 kg (132 lb)   SpO2 99%   BMI 24.94 kg/m²    Wt Readings from Last 3 Encounters:   02/05/25 59.9 kg (132 lb)   01/30/25 59.9 kg (132 lb)   01/23/25 59.9 kg (132 lb)      BP Readings from Last 3 Encounters:   02/05/25 170/90   01/30/25 162/82   01/23/25 130/70          Physical Exam  Vitals and nursing note reviewed.   Constitutional:       General: She is not in acute distress.     Appearance: Normal appearance. She is well-developed and normal weight. She is not ill-appearing or toxic-appearing.   HENT:      Head: Normocephalic and atraumatic.   Eyes:      Conjunctiva/sclera: Conjunctivae normal.   Neck:      Thyroid: No thyromegaly.      Vascular: No carotid bruit.   Cardiovascular:      Rate and Rhythm: Normal rate and regular rhythm.      Pulses: Normal pulses.      Heart sounds: Normal heart sounds.   Pulmonary:      Effort: Pulmonary effort is normal.      Breath sounds: Normal breath sounds.   Abdominal:      General: Bowel sounds are normal. There is no distension.      Palpations: Abdomen is soft. There is no mass.      Tenderness: There is no abdominal tenderness. There is no guarding or rebound.   Musculoskeletal:         General: " "No swelling or tenderness.      Cervical back: Neck supple.      Right lower leg: No edema.      Left lower leg: No edema.   Lymphadenopathy:      Cervical: No cervical adenopathy.   Neurological:      General: No focal deficit present.      Mental Status: She is alert and oriented to person, place, and time. Mental status is at baseline.   Psychiatric:         Mood and Affect: Mood normal.         Behavior: Behavior normal.         Thought Content: Thought content normal.         Judgment: Judgment normal.         Lab Results:      Lab Results   Component Value Date    WBC 7.42 01/21/2025    HGB 13.4 01/21/2025    HCT 40.1 01/21/2025     01/21/2025    TRIG 81 09/27/2024    HDL 46 (L) 09/27/2024    LDLDIRECT 125 (H) 02/03/2025    ALT 16 02/03/2025    AST 31 02/03/2025    K 5.0 02/03/2025     02/03/2025    CREATININE 1.03 02/03/2025    BUN 23 02/03/2025    CO2 18 (L) 02/03/2025    GLUF 88 02/03/2025    HGBA1C 5.6 09/27/2024     No results found for: \"URICACID\"  Invalid input(s): \"BASENAME\" Vitamin D    No results found.     POCT Labs        Depression Screening and Follow-up Plan: Patient was screened for depression during today's encounter. They screened negative with a PHQ-2 score of 0.              5 minutes spent on chart prep, 35 minutes spent with patient counseling/educating on their diagnoses, tests completed and any new tests ordered, any referrals placed, treatment options, and documentation of above today.   In prescribing new medications, or changing doses, we reviewed the risks and benefits and side effects of these medications along with other treatment options if appropriate.         "

## 2025-02-05 NOTE — ASSESSMENT & PLAN NOTE
Uncontrolled.  LDL not goal <70 s/p retinal vein occlusion.  Start Lipitor 20mg QHS.  Recommend lifestyle modifications.    Orders:  •  atorvastatin (LIPITOR) 20 mg tablet; Take 1 tablet (20 mg total) by mouth daily at bedtime  •  Comprehensive metabolic panel; Future  •  TSH, 3rd generation with Free T4 reflex; Future  •  LDL cholesterol, direct; Future

## 2025-02-05 NOTE — ASSESSMENT & PLAN NOTE
Controlled at home, elevated in office today.  Management per Cardio.  S/p recent Increase Lisinopril 40mg QD.  Continue Bystolic 20mg QD.  Continue to monitor on current regimen.  Check blood pressure outside of office.  Recommend lifestyle modifications.    Orders:  •  nebivolol (BYSTOLIC) 20 MG tablet; Take 1 tablet (20 mg total) by mouth daily

## 2025-02-05 NOTE — ASSESSMENT & PLAN NOTE
Stable.  S/p Renal consult at previous residence.  Continue fluid restriction and continue electrolyte powder.     Orders:  •  Comprehensive metabolic panel; Future

## 2025-02-05 NOTE — ASSESSMENT & PLAN NOTE
Stable on Pepcid 20mg BID PRN.  Pending GI consult as overdue for colonoscopy.  Watch GERD diet.       Orders:  •  Magnesium; Future

## 2025-02-05 NOTE — ASSESSMENT & PLAN NOTE
Stable.  Continue Magnesium glycinate 400mg 1 pill by mouth daily.   Orders:  •  Comprehensive metabolic panel; Future  •  Magnesium; Future

## 2025-02-05 NOTE — ASSESSMENT & PLAN NOTE
Management per Optho.      Orders:  •  atorvastatin (LIPITOR) 20 mg tablet; Take 1 tablet (20 mg total) by mouth daily at bedtime  •  LDL cholesterol, direct; Future

## 2025-03-04 ENCOUNTER — APPOINTMENT (OUTPATIENT)
Dept: LAB | Facility: CLINIC | Age: 80
End: 2025-03-04
Payer: MEDICARE

## 2025-03-04 ENCOUNTER — TELEPHONE (OUTPATIENT)
Age: 80
End: 2025-03-04

## 2025-03-04 DIAGNOSIS — N39.0 RECURRENT UTI: ICD-10-CM

## 2025-03-04 PROCEDURE — 87086 URINE CULTURE/COLONY COUNT: CPT

## 2025-03-04 NOTE — TELEPHONE ENCOUNTER
Pt called to inform Josh Guzmanmick that she did drop off a urine sample this morning, she wanted to make the clinical team aware so they can await the results.

## 2025-03-05 LAB — BACTERIA UR CULT: NORMAL

## 2025-03-08 DIAGNOSIS — E78.5 DYSLIPIDEMIA: ICD-10-CM

## 2025-03-08 DIAGNOSIS — H34.8322 STABLE BRANCH RETINAL VEIN OCCLUSION OF LEFT EYE: ICD-10-CM

## 2025-03-09 RX ORDER — ATORVASTATIN CALCIUM 20 MG/1
20 TABLET, FILM COATED ORAL
Qty: 90 TABLET | Refills: 3 | Status: SHIPPED | OUTPATIENT
Start: 2025-03-09

## 2025-03-10 NOTE — TELEPHONE ENCOUNTER
Patient continues with UTI symptoms. Today is the last day of antibiotics.    Complains of chills, cramping, minor burning with urination. Denies fever, hematuria    She is asking if she should have an extension of antibiotics.     Please advise and CB #[phone]

## 2025-03-10 NOTE — TELEPHONE ENCOUNTER
Patient called stating she is taking her antibiotics and this is the 7th day she is on it. she is feeling feverish, still having some burning , stinging and she is cramping and some having some chills. She would like to know how to proceed. Warm transfer to Triage nurse. MONIKA    Patient can be reached at [phone]

## 2025-03-11 NOTE — TELEPHONE ENCOUNTER
Called and left message to return call to office.     When patient calls back, please transfer to CTS.    CTS relay message below    CESAR Castanon16 hours ago (7:56 PM)       Urine culture from March 4 shows no infection.  She can use Pyridium or over-the-counter medication for burning.

## 2025-03-11 NOTE — TELEPHONE ENCOUNTER
Patient returned call, I provided her the information per Justin. Patient verbalized understanding.

## 2025-05-22 ENCOUNTER — OFFICE VISIT (OUTPATIENT)
Dept: FAMILY MEDICINE CLINIC | Facility: CLINIC | Age: 80
End: 2025-05-22
Payer: MEDICARE

## 2025-05-22 ENCOUNTER — TELEPHONE (OUTPATIENT)
Age: 80
End: 2025-05-22

## 2025-05-22 VITALS
WEIGHT: 131 LBS | OXYGEN SATURATION: 97 % | HEART RATE: 67 BPM | TEMPERATURE: 98.1 F | SYSTOLIC BLOOD PRESSURE: 190 MMHG | BODY MASS INDEX: 24.73 KG/M2 | HEIGHT: 61 IN | RESPIRATION RATE: 16 BRPM | DIASTOLIC BLOOD PRESSURE: 90 MMHG

## 2025-05-22 DIAGNOSIS — I35.1 AORTIC VALVE INSUFFICIENCY, ETIOLOGY OF CARDIAC VALVE DISEASE UNSPECIFIED: ICD-10-CM

## 2025-05-22 DIAGNOSIS — I05.8 MITRAL VALVE SCLEROSIS: ICD-10-CM

## 2025-05-22 DIAGNOSIS — I27.20 PULMONARY HYPERTENSION (HCC): ICD-10-CM

## 2025-05-22 DIAGNOSIS — M54.32 SCIATICA OF LEFT SIDE: ICD-10-CM

## 2025-05-22 DIAGNOSIS — I34.0 MITRAL VALVE INSUFFICIENCY, UNSPECIFIED ETIOLOGY: ICD-10-CM

## 2025-05-22 DIAGNOSIS — I51.89 GRADE II DIASTOLIC DYSFUNCTION: ICD-10-CM

## 2025-05-22 DIAGNOSIS — R53.81 PHYSICAL DECONDITIONING: ICD-10-CM

## 2025-05-22 DIAGNOSIS — I10 PRIMARY HYPERTENSION: Primary | ICD-10-CM

## 2025-05-22 DIAGNOSIS — I07.1 TRICUSPID VALVE INSUFFICIENCY, UNSPECIFIED ETIOLOGY: ICD-10-CM

## 2025-05-22 DIAGNOSIS — R53.83 OTHER FATIGUE: ICD-10-CM

## 2025-05-22 DIAGNOSIS — N18.31 CKD STAGE 3A, GFR 45-59 ML/MIN (HCC): ICD-10-CM

## 2025-05-22 DIAGNOSIS — H34.8322 STABLE BRANCH RETINAL VEIN OCCLUSION OF LEFT EYE: ICD-10-CM

## 2025-05-22 DIAGNOSIS — I51.7 LEFT ATRIAL ENLARGEMENT: ICD-10-CM

## 2025-05-22 PROCEDURE — 99215 OFFICE O/P EST HI 40 MIN: CPT | Performed by: FAMILY MEDICINE

## 2025-05-22 PROCEDURE — G2211 COMPLEX E/M VISIT ADD ON: HCPCS | Performed by: FAMILY MEDICINE

## 2025-05-22 RX ORDER — CHLORTHALIDONE 25 MG/1
25 TABLET ORAL DAILY
Qty: 30 TABLET | Refills: 1 | Status: SHIPPED | OUTPATIENT
Start: 2025-05-22

## 2025-05-22 NOTE — PROGRESS NOTES
Assessment/Plan:  Assessment & Plan  Primary hypertension  Uncontrolled at home and in office today.  Management per Cardio - patient desires 2nd opinion per Cardio and Renal consult.  Start Chorthalidone 25mg QD.  Continue Lisinopril 40mg QD and Bystolic 20mg QD.  Check blood pressure outside of office.  Recommend lifestyle modifications.      Orders:    Ambulatory Referral to Cardiology; Future    Ambulatory Referral to Nephrology; Future    chlorthalidone 25 mg tablet; Take 1 tablet (25 mg total) by mouth daily    Comprehensive metabolic panel; Future    Magnesium; Future     Physical deconditioning  Pending PT consult.      Orders:    Ambulatory Referral to Physical Therapy; Future    Sciatica of left side  Pending PT consult.    Orders:    Ambulatory Referral to Physical Therapy; Future    Left atrial enlargement  Management per Cardio.      Orders:    Ambulatory Referral to Cardiology; Future     Mitral valve sclerosis  Management per Cardio.      Orders:    Ambulatory Referral to Cardiology; Future     Tricuspid valve insufficiency, unspecified etiology  Management per Cardio.      Orders:    Ambulatory Referral to Cardiology; Future     Pulmonary hypertension (HCC)  Management per Cardio.      Orders:    Ambulatory Referral to Cardiology; Future     Grade II diastolic dysfunction  Management per Cardio.    Orders:    Ambulatory Referral to Cardiology; Future    Aortic valve insufficiency, etiology of cardiac valve disease unspecified  Management per Cardio.    Orders:    Ambulatory Referral to Cardiology; Future    Mitral valve insufficiency, unspecified etiology  Management per Cardio.    Orders:    Ambulatory Referral to Cardiology; Future    CKD stage 3a, GFR 45-59 ml/min ([facility])  Lab Results   Component Value Date    EGFR 51 02/03/2025    EGFR 68 01/21/2025    EGFR 62 09/27/2024    CREATININE 1.03 02/03/2025    CREATININE 0.81 01/21/2025    CREATININE 0.87 09/27/2024       Orders:    Ambulatory Referral to  Nephrology; Future    Stable branch retinal vein occlusion of left eye  Management per Optho.              Other fatigue  Patient encouraged to complete Nuclear Stress Test previously ordered per Cardio.            Return if symptoms worsen or fail to improve.      Future Appointments   Date Time Provider Department Center   6/9/2025  9:30 AM EA DEXA 1 EA DEXA [facility]   6/10/2025 10:00 AM Carline Howard DO [facility]   6/12/2025 11:40 AM DION Rothman [facility]   6/17/2025 10:00 AM Michelle Buchanan MD [facility]   7/11/2025 10:30 AM Josh Amor PA-C URO [facility]   11/3/2025 11:00 AM EA ECHO 1 EA CAR NI [facility]   11/19/2025 11:40 AM Vince Merritt MD [facility]        Subjective:     Chloé is a 79 y.o. female who presents today for a follow-up on her acute medical conditions.        HPI:  Chief Complaint   Patient presents with    neuropathy flare, talk about heart meds     -- Above per clinical staff and reviewed. --    HPI      Today:            HTN - Management per Cardio consult Dr. Merritt.  Last seen 1/23/25 by DION Rothman 1/23/25 - next appt 6/12/25.  Despite requests, she has been unable to see Dr. Merritt due to access issues.  On increased Lisinopril 40mg QD since 1/23/25, and Bystolic 20mg QD.  No longer has recurrent chest tightness and SOB.  BP check outside of office on arm cuff 158/87, pulse 62.  D/C Metoprolol ER 25mg QD due to bradycardia.  D/C Norvasc 5mg due LE Edema and SOB.       She states Ms. EVERETTSummerBrandon told her chest pain was previously due to GERD and anxiety.      Today, Patient feels fatigued - sleeping for 14 hours and taking 2 hour naps.  She is no longer leaving the house and having groceries delivered.  She is feeling unwell, tired, and unhappy.  She previously had URI and cancelled her nuclear medicine stress test yet, but is advised to complete the study.        Right Leg Paresthesias - She would  like to turn to physical therapy.            The following portions of the patient's history were reviewed and updated as appropriate: allergies, current medications, past family history, past medical history, past social history, past surgical history and problem list.      Review of Systems   Constitutional:  Positive for fatigue. Negative for appetite change, chills, diaphoresis and fever.   Respiratory:  Negative for chest tightness and shortness of breath.    Cardiovascular:  Negative for chest pain.   Gastrointestinal:  Negative for abdominal pain, blood in stool, diarrhea, nausea and vomiting.   Genitourinary:  Negative for dysuria.   Musculoskeletal:  Positive for myalgias.        Current Outpatient Medications   Medication Sig Dispense Refill    atorvastatin (LIPITOR) 20 mg tablet TAKE 1 TABLET BY MOUTH DAILY AT BEDTIME 90 tablet 3    Calcium 500 MG tablet Take 500 mg by mouth in the morning      chlorthalidone 25 mg tablet Take 1 tablet (25 mg total) by mouth daily 30 tablet 1    Cholecalciferol (Vitamin D3) 50 MCG (2000 UT) TABS Take 1 tablet by mouth in the morning.      famotidine (PEPCID) 20 mg tablet TAKE 1 TABLET BY MOUTH 2 TIMES A DAY AS NEEDED FOR HEARTBURN. 180 tablet 1    lisinopril (ZESTRIL) 40 mg tablet Take 1 tablet (40 mg total) by mouth daily 90 tablet 1    Magnesium Bisglycinate 100 MG TABS Take 400 mg by mouth in the morning      mometasone (ELOCON) 0.1 % cream Apply topically 2 (two) times a day as needed (Ear Eczema) 15 g 0    nebivolol (BYSTOLIC) 20 MG tablet Take 1 tablet (20 mg total) by mouth daily 90 tablet 2    Sodium Fluoride 5000 PPM 1.1 % GEL Take 1 Application by mouth daily at bedtime      triamcinolone (KENALOG) 0.1 % cream Apply 1 Application topically as needed in the morning and 1 Application as needed in the evening for rash.       No current facility-administered medications for this visit.       Objective:  BP (!) 190/90   Pulse 67   Temp 98.1 °F (36.7 °C) (Oral)    "Resp 16   Ht 5' 1\" (1.549 m)   Wt 59.4 kg (131 lb)   SpO2 97%   BMI 24.75 kg/m²    Wt Readings from Last 3 Encounters:   05/22/25 59.4 kg (131 lb)   02/05/25 59.9 kg (132 lb)   01/30/25 59.9 kg (132 lb)      BP Readings from Last 3 Encounters:   05/22/25 (!) 190/90   02/05/25 170/90   01/30/25 162/82          Physical Exam  Vitals and nursing note reviewed.   Constitutional:       General: She is not in acute distress.     Appearance: Normal appearance. She is well-developed and normal weight. She is not ill-appearing or toxic-appearing.   HENT:      Head: Normocephalic and atraumatic.     Eyes:      Conjunctiva/sclera: Conjunctivae normal.     Neck:      Thyroid: No thyromegaly.     Cardiovascular:      Rate and Rhythm: Normal rate and regular rhythm.      Pulses: Normal pulses.      Heart sounds: Normal heart sounds.   Pulmonary:      Effort: Pulmonary effort is normal.      Breath sounds: Normal breath sounds.   Abdominal:      General: Bowel sounds are normal.     Musculoskeletal:         General: No swelling or tenderness.      Cervical back: Neck supple.      Right lower leg: No edema.      Left lower leg: No edema.   Lymphadenopathy:      Cervical: No cervical adenopathy.     Neurological:      General: No focal deficit present.      Mental Status: She is alert and oriented to person, place, and time. Mental status is at baseline.     Psychiatric:         Mood and Affect: Mood normal.         Lab Results:      Lab Results   Component Value Date    WBC 7.42 01/21/2025    HGB 13.4 01/21/2025    HCT 40.1 01/21/2025     01/21/2025    TRIG 81 09/27/2024    HDL 46 (L) 09/27/2024    LDLDIRECT 125 (H) 02/03/2025    ALT 16 02/03/2025    AST 31 02/03/2025    K 5.0 02/03/2025     02/03/2025    CREATININE 1.03 02/03/2025    BUN 23 02/03/2025    CO2 18 (L) 02/03/2025    GLUF 88 02/03/2025    HGBA1C 5.6 09/27/2024     No results found for: \"URICACID\"  Invalid input(s): \"BASENAME\" Vitamin D    No results " found.     POCT Labs                 5 minutes spent on chart prep, 35 minutes spent with patient counseling/educating on their diagnoses, tests completed and any new tests ordered, any referrals placed, treatment options, and documentation of above today.   In prescribing new medications, or changing doses, we reviewed the risks and benefits and side effects of these medications along with other treatment options if appropriate.

## 2025-05-22 NOTE — ASSESSMENT & PLAN NOTE
Uncontrolled at home and in office today.  Management per Cardio - patient desires 2nd opinion per Cardio and Renal consult.  Start Chorthalidone 25mg QD.  Continue Lisinopril 40mg QD and Bystolic 20mg QD.  Check blood pressure outside of office.  Recommend lifestyle modifications.      Orders:    Ambulatory Referral to Cardiology; Future    Ambulatory Referral to Nephrology; Future    chlorthalidone 25 mg tablet; Take 1 tablet (25 mg total) by mouth daily    Comprehensive metabolic panel; Future    Magnesium; Future

## 2025-05-29 ENCOUNTER — RESULTS FOLLOW-UP (OUTPATIENT)
Dept: FAMILY MEDICINE CLINIC | Facility: CLINIC | Age: 80
End: 2025-05-29

## 2025-05-29 ENCOUNTER — APPOINTMENT (OUTPATIENT)
Dept: LAB | Facility: CLINIC | Age: 80
End: 2025-05-29
Payer: MEDICARE

## 2025-05-29 DIAGNOSIS — I10 PRIMARY HYPERTENSION: Primary | ICD-10-CM

## 2025-05-29 DIAGNOSIS — N18.31 CKD STAGE 3A, GFR 45-59 ML/MIN (HCC): ICD-10-CM

## 2025-05-29 DIAGNOSIS — E87.1 HYPONATREMIA: ICD-10-CM

## 2025-05-29 DIAGNOSIS — K21.9 GASTROESOPHAGEAL REFLUX DISEASE, UNSPECIFIED WHETHER ESOPHAGITIS PRESENT: ICD-10-CM

## 2025-05-29 DIAGNOSIS — H34.8322 STABLE BRANCH RETINAL VEIN OCCLUSION OF LEFT EYE: ICD-10-CM

## 2025-05-29 DIAGNOSIS — I10 PRIMARY HYPERTENSION: ICD-10-CM

## 2025-05-29 DIAGNOSIS — F41.9 ANXIETY: ICD-10-CM

## 2025-05-29 DIAGNOSIS — E83.42 HYPOMAGNESEMIA: ICD-10-CM

## 2025-05-29 DIAGNOSIS — E78.5 DYSLIPIDEMIA: ICD-10-CM

## 2025-05-29 LAB
ALBUMIN SERPL BCG-MCNC: 3.9 G/DL (ref 3.5–5)
ALP SERPL-CCNC: 95 U/L (ref 34–104)
ALT SERPL W P-5'-P-CCNC: 13 U/L (ref 7–52)
ANION GAP SERPL CALCULATED.3IONS-SCNC: 9 MMOL/L (ref 4–13)
AST SERPL W P-5'-P-CCNC: 19 U/L (ref 13–39)
BILIRUB SERPL-MCNC: 0.44 MG/DL (ref 0.2–1)
BUN SERPL-MCNC: 22 MG/DL (ref 5–25)
CALCIUM SERPL-MCNC: 9.5 MG/DL (ref 8.4–10.2)
CHLORIDE SERPL-SCNC: 90 MMOL/L (ref 96–108)
CO2 SERPL-SCNC: 29 MMOL/L (ref 21–32)
CREAT SERPL-MCNC: 0.79 MG/DL (ref 0.6–1.3)
GFR SERPL CREATININE-BSD FRML MDRD: 70 ML/MIN/1.73SQ M
GLUCOSE SERPL-MCNC: 142 MG/DL (ref 65–140)
MAGNESIUM SERPL-MCNC: 1.5 MG/DL (ref 1.9–2.7)
POTASSIUM SERPL-SCNC: 3.6 MMOL/L (ref 3.5–5.3)
PROT SERPL-MCNC: 6.6 G/DL (ref 6.4–8.4)
SODIUM SERPL-SCNC: 128 MMOL/L (ref 135–147)

## 2025-05-29 PROCEDURE — 36415 COLL VENOUS BLD VENIPUNCTURE: CPT

## 2025-05-29 PROCEDURE — 80053 COMPREHEN METABOLIC PANEL: CPT

## 2025-05-29 PROCEDURE — 83735 ASSAY OF MAGNESIUM: CPT

## 2025-05-29 RX ORDER — NEBIVOLOL 20 MG/1
30 TABLET ORAL DAILY
Status: SHIPPED
Start: 2025-05-29 | End: 2025-05-30

## 2025-05-30 ENCOUNTER — TELEPHONE (OUTPATIENT)
Age: 80
End: 2025-05-30

## 2025-05-30 RX ORDER — NEBIVOLOL 20 MG/1
20 TABLET ORAL DAILY
Status: SHIPPED
Start: 2025-05-30 | End: 2025-06-02

## 2025-05-30 RX ORDER — CHLORTHALIDONE 25 MG/1
12.5 TABLET ORAL DAILY
Start: 2025-05-30 | End: 2025-06-02

## 2025-05-30 NOTE — TELEPHONE ENCOUNTER
"Hello,    The following message was sent via e-mail to the leadership team:     Please advise if you can help facilitate the following overbook request:    Patient Name: Chloé Anderson    Patient MRN: [id number]    Call back #: [phone]    Insurance: medicare/[facility]    Department:Cardiology    Speciality: General Cardiology    Reason for overbook request: STAT REFERRAL    Comments (Write \"N/a\" if no comments): pt is not new, saw Dr. Merritt last year, but was referred to Dr. Weir by PCP and pt wants to see her    Requested doctor and location: [place]    Date of current appointment: 6/1/26      Thank you.        "

## 2025-06-02 ENCOUNTER — APPOINTMENT (OUTPATIENT)
Dept: LAB | Facility: CLINIC | Age: 80
End: 2025-06-02
Payer: MEDICARE

## 2025-06-02 DIAGNOSIS — E87.1 HYPONATREMIA: ICD-10-CM

## 2025-06-02 LAB
ALBUMIN SERPL BCG-MCNC: 3.7 G/DL (ref 3.5–5)
ALP SERPL-CCNC: 86 U/L (ref 34–104)
ALT SERPL W P-5'-P-CCNC: 12 U/L (ref 7–52)
ANION GAP SERPL CALCULATED.3IONS-SCNC: 7 MMOL/L (ref 4–13)
AST SERPL W P-5'-P-CCNC: 19 U/L (ref 13–39)
BILIRUB SERPL-MCNC: 0.45 MG/DL (ref 0.2–1)
BUN SERPL-MCNC: 22 MG/DL (ref 5–25)
CALCIUM SERPL-MCNC: 8.9 MG/DL (ref 8.4–10.2)
CHLORIDE SERPL-SCNC: 90 MMOL/L (ref 96–108)
CO2 SERPL-SCNC: 29 MMOL/L (ref 21–32)
CREAT SERPL-MCNC: 0.73 MG/DL (ref 0.6–1.3)
GFR SERPL CREATININE-BSD FRML MDRD: 77 ML/MIN/1.73SQ M
GLUCOSE P FAST SERPL-MCNC: 103 MG/DL (ref 65–99)
LDLC SERPL DIRECT ASSAY-MCNC: 82 MG/DL (ref 0–100)
MAGNESIUM SERPL-MCNC: 1.5 MG/DL (ref 1.9–2.7)
POTASSIUM SERPL-SCNC: 3.7 MMOL/L (ref 3.5–5.3)
PROT SERPL-MCNC: 6.6 G/DL (ref 6.4–8.4)
SODIUM SERPL-SCNC: 126 MMOL/L (ref 135–147)
TSH SERPL DL<=0.05 MIU/L-ACNC: 1.86 UIU/ML (ref 0.45–4.5)

## 2025-06-02 PROCEDURE — 36415 COLL VENOUS BLD VENIPUNCTURE: CPT

## 2025-06-02 PROCEDURE — 83735 ASSAY OF MAGNESIUM: CPT

## 2025-06-02 PROCEDURE — 84443 ASSAY THYROID STIM HORMONE: CPT

## 2025-06-02 PROCEDURE — 80053 COMPREHEN METABOLIC PANEL: CPT

## 2025-06-02 PROCEDURE — 83721 ASSAY OF BLOOD LIPOPROTEIN: CPT

## 2025-06-02 RX ORDER — NEBIVOLOL 20 MG/1
30 TABLET ORAL DAILY
Status: SHIPPED
Start: 2025-06-02

## 2025-06-05 ENCOUNTER — RESULTS FOLLOW-UP (OUTPATIENT)
Dept: FAMILY MEDICINE CLINIC | Facility: CLINIC | Age: 80
End: 2025-06-05

## 2025-06-05 ENCOUNTER — APPOINTMENT (OUTPATIENT)
Dept: LAB | Facility: CLINIC | Age: 80
End: 2025-06-05
Payer: MEDICARE

## 2025-06-05 DIAGNOSIS — E87.1 HYPONATREMIA: Primary | ICD-10-CM

## 2025-06-05 DIAGNOSIS — E83.42 HYPOMAGNESEMIA: ICD-10-CM

## 2025-06-05 DIAGNOSIS — E87.1 HYPONATREMIA: ICD-10-CM

## 2025-06-05 DIAGNOSIS — I10 PRIMARY HYPERTENSION: ICD-10-CM

## 2025-06-05 DIAGNOSIS — N18.31 CKD STAGE 3A, GFR 45-59 ML/MIN (HCC): ICD-10-CM

## 2025-06-05 LAB
ALBUMIN SERPL BCG-MCNC: 3.7 G/DL (ref 3.5–5)
ALP SERPL-CCNC: 74 U/L (ref 34–104)
ALT SERPL W P-5'-P-CCNC: 12 U/L (ref 7–52)
ANION GAP SERPL CALCULATED.3IONS-SCNC: 6 MMOL/L (ref 4–13)
AST SERPL W P-5'-P-CCNC: 18 U/L (ref 13–39)
BILIRUB SERPL-MCNC: 0.64 MG/DL (ref 0.2–1)
BUN SERPL-MCNC: 21 MG/DL (ref 5–25)
CALCIUM SERPL-MCNC: 9.1 MG/DL (ref 8.4–10.2)
CHLORIDE SERPL-SCNC: 93 MMOL/L (ref 96–108)
CO2 SERPL-SCNC: 31 MMOL/L (ref 21–32)
CREAT SERPL-MCNC: 0.75 MG/DL (ref 0.6–1.3)
GFR SERPL CREATININE-BSD FRML MDRD: 74 ML/MIN/1.73SQ M
GLUCOSE P FAST SERPL-MCNC: 105 MG/DL (ref 65–99)
MAGNESIUM SERPL-MCNC: 1.7 MG/DL (ref 1.9–2.7)
POTASSIUM SERPL-SCNC: 4.1 MMOL/L (ref 3.5–5.3)
PROT SERPL-MCNC: 6.8 G/DL (ref 6.4–8.4)
SODIUM SERPL-SCNC: 130 MMOL/L (ref 135–147)

## 2025-06-05 PROCEDURE — 83735 ASSAY OF MAGNESIUM: CPT

## 2025-06-05 PROCEDURE — 36415 COLL VENOUS BLD VENIPUNCTURE: CPT

## 2025-06-05 PROCEDURE — 80053 COMPREHEN METABOLIC PANEL: CPT

## 2025-06-05 NOTE — RESULT ENCOUNTER NOTE
Hyponatremia and Hypomagnesemia -  Low sodium and magnesium. Improving after stopping Chorthalidone 12.5mg QD.  How is patient's BP since increasing Bystolic 20mg 1.5 pills daily? Nurse to see orders to repeat CMP and Magnesium on Monday, 6/9/25.     Message sent to patient via [facility] patient portal.    Nurse to also call patient.       Received a call from Donald at [x+1], stating Lorazepam requires a prior auth.    He will fax a form to fill out and return.

## 2025-06-09 ENCOUNTER — RESULTS FOLLOW-UP (OUTPATIENT)
Dept: FAMILY MEDICINE CLINIC | Facility: CLINIC | Age: 80
End: 2025-06-09

## 2025-06-09 ENCOUNTER — APPOINTMENT (OUTPATIENT)
Dept: LAB | Facility: CLINIC | Age: 80
End: 2025-06-09
Payer: MEDICARE

## 2025-06-09 DIAGNOSIS — E87.1 HYPONATREMIA: ICD-10-CM

## 2025-06-09 DIAGNOSIS — E83.42 HYPOMAGNESEMIA: ICD-10-CM

## 2025-06-09 LAB
ALBUMIN SERPL BCG-MCNC: 3.6 G/DL (ref 3.5–5)
ALP SERPL-CCNC: 76 U/L (ref 34–104)
ALT SERPL W P-5'-P-CCNC: 13 U/L (ref 7–52)
ANION GAP SERPL CALCULATED.3IONS-SCNC: 7 MMOL/L (ref 4–13)
AST SERPL W P-5'-P-CCNC: 19 U/L (ref 13–39)
BILIRUB SERPL-MCNC: 0.5 MG/DL (ref 0.2–1)
BUN SERPL-MCNC: 19 MG/DL (ref 5–25)
CALCIUM SERPL-MCNC: 9.2 MG/DL (ref 8.4–10.2)
CHLORIDE SERPL-SCNC: 97 MMOL/L (ref 96–108)
CO2 SERPL-SCNC: 30 MMOL/L (ref 21–32)
CREAT SERPL-MCNC: 0.75 MG/DL (ref 0.6–1.3)
GFR SERPL CREATININE-BSD FRML MDRD: 74 ML/MIN/1.73SQ M
GLUCOSE SERPL-MCNC: 101 MG/DL (ref 65–140)
MAGNESIUM SERPL-MCNC: 1.7 MG/DL (ref 1.9–2.7)
POTASSIUM SERPL-SCNC: 4.2 MMOL/L (ref 3.5–5.3)
PROT SERPL-MCNC: 6.5 G/DL (ref 6.4–8.4)
SODIUM SERPL-SCNC: 134 MMOL/L (ref 135–147)

## 2025-06-09 PROCEDURE — 83735 ASSAY OF MAGNESIUM: CPT

## 2025-06-09 PROCEDURE — 36415 COLL VENOUS BLD VENIPUNCTURE: CPT

## 2025-06-09 PROCEDURE — 80053 COMPREHEN METABOLIC PANEL: CPT

## 2025-06-10 ENCOUNTER — OFFICE VISIT (OUTPATIENT)
Dept: FAMILY MEDICINE CLINIC | Facility: CLINIC | Age: 80
End: 2025-06-10
Payer: MEDICARE

## 2025-06-10 VITALS
TEMPERATURE: 97 F | HEART RATE: 59 BPM | WEIGHT: 131.8 LBS | HEIGHT: 61 IN | OXYGEN SATURATION: 99 % | SYSTOLIC BLOOD PRESSURE: 162 MMHG | DIASTOLIC BLOOD PRESSURE: 80 MMHG | BODY MASS INDEX: 24.88 KG/M2

## 2025-06-10 DIAGNOSIS — Z85.828 HISTORY OF BASAL CELL CARCINOMA (BCC) OF SKIN: ICD-10-CM

## 2025-06-10 DIAGNOSIS — K21.9 GASTROESOPHAGEAL REFLUX DISEASE, UNSPECIFIED WHETHER ESOPHAGITIS PRESENT: ICD-10-CM

## 2025-06-10 DIAGNOSIS — H34.8322 STABLE BRANCH RETINAL VEIN OCCLUSION OF LEFT EYE: ICD-10-CM

## 2025-06-10 DIAGNOSIS — F41.9 ANXIETY: ICD-10-CM

## 2025-06-10 DIAGNOSIS — L30.9 ECZEMA, UNSPECIFIED TYPE: ICD-10-CM

## 2025-06-10 DIAGNOSIS — I05.8 MITRAL VALVE SCLEROSIS: ICD-10-CM

## 2025-06-10 DIAGNOSIS — E78.5 DYSLIPIDEMIA: ICD-10-CM

## 2025-06-10 DIAGNOSIS — I07.1 TRICUSPID VALVE INSUFFICIENCY, UNSPECIFIED ETIOLOGY: ICD-10-CM

## 2025-06-10 DIAGNOSIS — I27.20 PULMONARY HYPERTENSION (HCC): ICD-10-CM

## 2025-06-10 DIAGNOSIS — E83.42 HYPOMAGNESEMIA: ICD-10-CM

## 2025-06-10 DIAGNOSIS — M81.0 AGE-RELATED OSTEOPOROSIS WITHOUT CURRENT PATHOLOGICAL FRACTURE: ICD-10-CM

## 2025-06-10 DIAGNOSIS — I10 PRIMARY HYPERTENSION: Primary | ICD-10-CM

## 2025-06-10 DIAGNOSIS — R73.01 IFG (IMPAIRED FASTING GLUCOSE): ICD-10-CM

## 2025-06-10 DIAGNOSIS — E87.1 HYPONATREMIA: ICD-10-CM

## 2025-06-10 DIAGNOSIS — I51.7 LEFT ATRIAL ENLARGEMENT: ICD-10-CM

## 2025-06-10 PROCEDURE — 99215 OFFICE O/P EST HI 40 MIN: CPT | Performed by: FAMILY MEDICINE

## 2025-06-10 PROCEDURE — G2211 COMPLEX E/M VISIT ADD ON: HCPCS | Performed by: FAMILY MEDICINE

## 2025-06-10 RX ORDER — HYDROXYZINE HYDROCHLORIDE 25 MG/1
12.5-25 TABLET, FILM COATED ORAL EVERY 6 HOURS PRN
Qty: 30 TABLET | Refills: 0 | Status: SHIPPED | OUTPATIENT
Start: 2025-06-10

## 2025-06-10 NOTE — ASSESSMENT & PLAN NOTE
Improved, but uncontrolled.  LDL not goal <70 s/p retinal vein occlusion.  Continue Lipitor 20mg QHS.  Recommend lifestyle modifications.      Orders:    Lipid panel; Future    TSH, 3rd generation with Free T4 reflex; Future    LDL cholesterol, direct; Future

## 2025-06-10 NOTE — ASSESSMENT & PLAN NOTE
Stable.  Start Atarax 25mg 1/2-1 tab q6 hours PRN Anxiety.  Side effects discussed.          Orders:    hydrOXYzine HCL (ATARAX) 25 mg tablet; Take 0.5-1 tablets (12.5-25 mg total) by mouth every 6 (six) hours as needed for anxiety

## 2025-06-10 NOTE — ASSESSMENT & PLAN NOTE
Stable on Pepcid 20mg BID PRN.  Pending GI consult as overdue for colonoscopy.  Watch GERD diet.

## 2025-06-10 NOTE — PROGRESS NOTES
Assessment/Plan:  Assessment & Plan  Primary hypertension  Controlled at home and elevated in office today.  Management per Cardio - patient desires 2nd opinion per Cardio and Renal consult.  Continue Lisinopril 40mg QD and s/p recent increase Bystolic 30mg QD.  Check blood pressure outside of office.  Recommend lifestyle modifications.      Orders:    Comprehensive metabolic panel; Future     Dyslipidemia  Improved, but uncontrolled.  LDL not goal <70 s/p retinal vein occlusion.  Continue Lipitor 20mg QHS.  Recommend lifestyle modifications.      Orders:    Lipid panel; Future    TSH, 3rd generation with Free T4 reflex; Future    LDL cholesterol, direct; Future     Gastroesophageal reflux disease, unspecified whether esophagitis present  Stable on Pepcid 20mg BID PRN.  Pending GI consult as overdue for colonoscopy.  Watch GERD diet.               Anxiety  Stable.  Start Atarax 25mg 1/2-1 tab q6 hours PRN Anxiety.  Side effects discussed.          Orders:    hydrOXYzine HCL (ATARAX) 25 mg tablet; Take 0.5-1 tablets (12.5-25 mg total) by mouth every 6 (six) hours as needed for anxiety     Eczema, unspecified type  Stable on Rx topical steroids.            Hypomagnesemia  Increase Magnesium glycinate 400mg 1 pill by mouth daily.     Orders:    Comprehensive metabolic panel; Future    Magnesium; Future    Magnesium; Future     Hyponatremia  Improving.  Pending Renal consult.  Continue fluid restriction (can increased to 2L daily) and continue electrolyte powder.       Orders:    Comprehensive metabolic panel; Future     Left atrial enlargement  Management per Cardio.            Mitral valve sclerosis  Management per Cardio.            Tricuspid valve insufficiency, unspecified etiology  Management per Cardio.            Pulmonary hypertension (HCC)  Management per Cardio.            Age-related osteoporosis without current pathological fracture  Management per Endo.  Dexa is up to date.  No meds currently.             History of basal cell carcinoma (BCC) of skin  Management per Derm.         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              IFG (impaired fasting glucose)    Orders:    Hemoglobin A1C; Future          Return in about 4 months (around 10/10/2025) for AWV / 4mo - HTN, Anxiety, CKD, GERD, Labs.      Future Appointments   Date Time Provider Department Center   6/16/2025 10:15 AM Pavithra Fraser, PT BE PT [facility]   6/18/2025 12:00 PM Pavithra Fraser, PT [facility]   6/25/2025  2:00 PM Pavithra Fraser, PT [facility]   6/27/2025  1:45 PM Pavithra Fraser, PT [facility]   7/11/2025 10:30 AM Josh Amor PA-C [facility]   7/16/2025  2:30 PM Courtney Ortiz MD NEPH [facility]   10/10/2025 10:30 AM Carline Hoawrd DO  [facility]   11/3/2025 11:00 AM EA ECHO 1 EA CAR [facility]   11/19/2025 11:40 AM Vince Merritt MD [facility]   6/1/2026  3:40 PM Karina Weir DO [facility]        Subjective:     Chloé is a 79 y.o. female who presents today for a follow-up on her chronic medical conditions.        HPI:  Chief Complaint   Patient presents with    Follow-up     -- Above per clinical staff and reviewed. --      HPI      Today:      Return in about 4 months (around 6/5/2025) for 40min - 4mo - HTN, Anxiety, CKD, GERD, Labs.     4mo OV     Watching diet.  No Exercise - Previously Gym 20-30 minutes, for 2-3 times per week.     Physical Deconditioning / Left Sciatica- Will start PT 6/25.         HTN - Management per Cardio consult Dr. Merritt - Next appt 11/25, Dr. Weir 6/26.  On increased Lisinopril 40mg QD.  I increased Bystolic 20mg 1.5 pills QD 6/2/25 x 1 week.  No longer has recurrent chest tightness and SOB.  BP check outside of office on arm cuff 135/70, pulse 65.  D/C Metoprolol ER 25mg QD due to bradycardia, D/C Chlorthalidone 12.5mg QD due to hyponatremia.        H/O Hyponatremia - Pending  Renal consult Dr. Ortiz .  Last saw Renal  prior to move - did not advise Renal F/U.  Had fluid restriction, but patient has not been monitoring.  She has been drinking electrolete powder daily.  She has decreased her fluids to 1250mL daily, and has dry mouth.       Left Atrial Enlargement / Mitral Valve Sclerosis / Tricuspid Regurgitation / Pulmonary HTN - Last Echo 24, due to repeat .  Management per Cardio Dr. Merritt - Next appt .         Hyperlipidemia - On Lipitor 20mg QHS x 4 months.  No other statin previously.       H/O Left Retinal Vein Occlusion / Retinal Issues -  Management per Retinal consult c Dr. Alicea - Next appt .       Anxiety - Mood is stable.  She paints daily.  Good social supports.  No SI/HI/AH/VH.  D/C Valium 5mg BID PRN per previous PCP - last dose?, taking?  Using for flying, pre-op anxiety.  Was previously Rx 15 pills per year by prior PCP.         PHQ-2/9 Depression Screening    Little interest or pleasure in doing things: 0 - not at all  Feeling down, depressed, or hopeless: 0 - not at all  Trouble falling or staying asleep, or sleeping too much: 1 - several days  Feeling tired or having little energy: 0 - not at all  Poor appetite or overeatin - not at all  Feeling bad about yourself - or that you are a failure or have let yourself or your family down: 0 - not at all  Trouble concentrating on things, such as reading the newspaper or watching television: 0 - not at all  Moving or speaking so slowly that other people could have noticed. Or the opposite - being so fidgety or restless that you have been moving around a lot more than usual: 0 - not at all  Thoughts that you would be better off dead, or of hurting yourself in some way: 0 - not at all  PHQ-2 Score: 0  PHQ-2 Interpretation: Negative depression screen  PHQ-9 Score: 1  PHQ-9 Interpretation: No or Minimal depression       ESTEFANI-7 Flowsheet Screening      Flowsheet Row Most Recent Value   Over the last two  weeks, how often have you been bothered by the following problems?     Feeling nervous, anxious, or on edge 0   Not being able to stop or control worrying 0   Worrying too much about different things 0   Trouble relaxing  0   Being so restless that it's hard to sit still 0   Becoming easily annoyed or irritable  0   Feeling afraid as if something awful might happen 0   How difficult have these problems made it for you to do your work, take care of things at home, or get along with other people?  Not difficult at all   ESTEFANI Score  0               GERD / H/O Food Impaction - Referred to GI 8/12/24 - No appt scheduled yet.  Not using Pepcid 20mg TID PRN.  Self-weaned Protonix 40mg QD s issue.  Last EGD 2022- was supped to be done 8/23, but D/C due to uncontrolled BP.   Overdue for colonosocpy.  No other GERD meds previously?  Watching GERD diet.       Osteoporosis - Management per Endo Dr. Buchanan - Next appt 6/25.  Last Dexa 6/5/23 - Overdue.  Patient has not been on meds previously - Prolia was advised.       H/O BCC / Eczema - Management per Derm Dr. Sheets 5/26.  On Elocon BID PRN ear eczema.  Uses twice yearly.        Recurrent UTI - Management per Uro Justin Amor PA-C - Next appt 7/25.          Reviewed:  Labs 6/9/25, 6/2/25, Echo 11/21/22, Stress Test 12/30/22, Cardio 1/23/25, Uro 1/3/25, Endo 11/21/24, Optho 4/4/25     No Gyn.  S/p Lap Total Hysterectomy due to Uterine Prolapase.      The following portions of the patient's history were reviewed and updated as appropriate: allergies, current medications, past family history, past medical history, past social history, past surgical history and problem list.      Review of Systems   Constitutional:  Positive for fatigue. Negative for appetite change, chills, diaphoresis and fever.   Respiratory:  Negative for chest tightness and shortness of breath.    Cardiovascular:  Negative for chest pain and palpitations.   Gastrointestinal:  Negative for abdominal  "pain, blood in stool, diarrhea, nausea and vomiting.   Genitourinary:  Negative for dysuria.        Current Outpatient Medications   Medication Sig Dispense Refill    atorvastatin (LIPITOR) 20 mg tablet TAKE 1 TABLET BY MOUTH DAILY AT BEDTIME 90 tablet 3    Calcium 500 MG tablet Take 500 mg by mouth in the morning      Cholecalciferol (Vitamin D3) 50 MCG (2000 UT) TABS Take 1 tablet by mouth in the morning.      famotidine (PEPCID) 20 mg tablet TAKE 1 TABLET BY MOUTH 2 TIMES A DAY AS NEEDED FOR HEARTBURN. 180 tablet 1    hydrOXYzine HCL (ATARAX) 25 mg tablet Take 0.5-1 tablets (12.5-25 mg total) by mouth every 6 (six) hours as needed for anxiety 30 tablet 0    lisinopril (ZESTRIL) 40 mg tablet Take 1 tablet (40 mg total) by mouth daily 90 tablet 1    Magnesium Bisglycinate 100 MG TABS Take 120 mg by mouth in the morning 24mg per capsule      mometasone (ELOCON) 0.1 % cream Apply topically 2 (two) times a day as needed (Ear Eczema) 15 g 0    nebivolol (BYSTOLIC) 20 MG tablet Take 1.5 tablets (30 mg total) by mouth daily      NON FORMULARY Take 2,000 mg by mouth in the morning D-Mannose      Sodium Fluoride 5000 PPM 1.1 % GEL Take 1 Application by mouth daily at bedtime      triamcinolone (KENALOG) 0.1 % cream Apply 1 Application topically as needed in the morning and 1 Application as needed in the evening for rash.       No current facility-administered medications for this visit.       Objective:  /80   Pulse 59   Temp (!) 97 °F (36.1 °C) (Temporal)   Ht 5' 1\" (1.549 m)   Wt 59.8 kg (131 lb 12.8 oz)   SpO2 99%   BMI 24.90 kg/m²    Wt Readings from Last 3 Encounters:   06/10/25 59.8 kg (131 lb 12.8 oz)   05/22/25 59.4 kg (131 lb)   02/05/25 59.9 kg (132 lb)      BP Readings from Last 3 Encounters:   06/10/25 162/80   05/22/25 (!) 190/90   02/05/25 170/90          Physical Exam  Vitals and nursing note reviewed.   Constitutional:       Appearance: Normal appearance. She is well-developed and normal weight. " "  HENT:      Head: Normocephalic and atraumatic.     Eyes:      Conjunctiva/sclera: Conjunctivae normal.     Neck:      Thyroid: No thyromegaly.      Vascular: No carotid bruit.     Cardiovascular:      Rate and Rhythm: Normal rate and regular rhythm.      Pulses: Normal pulses.      Heart sounds: Normal heart sounds.   Pulmonary:      Effort: Pulmonary effort is normal.      Breath sounds: Normal breath sounds.   Abdominal:      General: Bowel sounds are normal. There is no distension.      Palpations: Abdomen is soft. There is no mass.      Tenderness: There is no abdominal tenderness. There is no guarding or rebound.     Musculoskeletal:         General: No swelling or tenderness.      Cervical back: Neck supple.      Right lower leg: No edema.      Left lower leg: No edema.   Lymphadenopathy:      Cervical: No cervical adenopathy.     Neurological:      General: No focal deficit present.      Mental Status: She is alert and oriented to person, place, and time.     Psychiatric:         Mood and Affect: Mood normal.         Behavior: Behavior normal.         Thought Content: Thought content normal.         Judgment: Judgment normal.         Lab Results:      Lab Results   Component Value Date    WBC 7.42 01/21/2025    HGB 13.4 01/21/2025    HCT 40.1 01/21/2025     01/21/2025    TRIG 81 09/27/2024    HDL 46 (L) 09/27/2024    LDLDIRECT 82 06/02/2025    ALT 13 06/09/2025    AST 19 06/09/2025    K 4.2 06/09/2025    CL 97 06/09/2025    CREATININE 0.75 06/09/2025    BUN 19 06/09/2025    CO2 30 06/09/2025    GLUF 105 (H) 06/05/2025    HGBA1C 5.6 09/27/2024     No results found for: \"URICACID\"  Invalid input(s): \"BASENAME\" Vitamin D    No results found.     POCT Labs           5 minutes spent on chart prep, 35 minutes spent with patient counseling/educating on their diagnoses, tests completed and any new tests ordered, any referrals placed, treatment options, and documentation of above today.   In prescribing new " medications, or changing doses, we reviewed the risks and benefits and side effects of these medications along with other treatment options if appropriate.

## 2025-06-10 NOTE — ASSESSMENT & PLAN NOTE
Increase Magnesium glycinate 400mg 1 pill by mouth daily.     Orders:    Comprehensive metabolic panel; Future    Magnesium; Future    Magnesium; Future

## 2025-06-10 NOTE — ASSESSMENT & PLAN NOTE
Improving.  Pending Renal consult.  Continue fluid restriction (can increased to 2L daily) and continue electrolyte powder.       Orders:    Comprehensive metabolic panel; Future

## 2025-06-10 NOTE — ASSESSMENT & PLAN NOTE
Controlled at home and elevated in office today.  Management per Cardio - patient desires 2nd opinion per Cardio and Renal consult.  Continue Lisinopril 40mg QD and s/p recent increase Bystolic 30mg QD.  Check blood pressure outside of office.  Recommend lifestyle modifications.      Orders:    Comprehensive metabolic panel; Future

## 2025-06-11 ENCOUNTER — EVALUATION (OUTPATIENT)
Dept: PHYSICAL THERAPY | Facility: CLINIC | Age: 80
End: 2025-06-11
Attending: FAMILY MEDICINE
Payer: MEDICARE

## 2025-06-11 ENCOUNTER — TELEPHONE (OUTPATIENT)
Age: 80
End: 2025-06-11

## 2025-06-11 DIAGNOSIS — M54.16 LUMBAR RADICULOPATHY: Primary | ICD-10-CM

## 2025-06-11 PROCEDURE — 97162 PT EVAL MOD COMPLEX 30 MIN: CPT

## 2025-06-11 NOTE — PROGRESS NOTES
PT Evaluation     Today's date: 2025  Patient name: Chloé Anderson  : 1945  MRN: [id number]  Referring provider: Carline Howard DO  Dx:   Encounter Diagnosis     ICD-10-CM    1. Lumbar radiculopathy  M54.16           Start Time: 1015  Stop Time: 1100  Total time in clinic (min): 45 minutes    Assessment/Plan    Subjective Evaluation    History of Present Illness  Mechanism of injury: WORK/SCHOOL: retired  HOME LIFE: independent,   HOBBIES/EXERCISE: cycling, rowing, treadmill at gym, gym class,   PLOF:  previous lumbar pain  HISTORY OF CURRENT INJURY:  Pain began after having some cardiac challenges.  It got to the point where she was unable to her regular activities.  She got to the point where was having her groceries delivered to her.  She hasn't been able to get as much of her activities done.    PAIN LOCATION/DESCRIPTORS: low back radiating down to R LE, upper hip, anterior quad, LE is heavy/dead weight,   AGGRAVATING FACTORS:  lifting, walking, cleaning, bending, in and out of car, waling to mailbox, bed mobility,   EASES: sitting, heat, shower,   DAY PATTERN: no particular pattern  IMAGING:    Chloé denies a new onset of Bladder incontinence, Bowel dysfunction, Dizziness, Dysphagia, Dysarthria, Drop attacks, Diplopia, Nausea, Ataxia, Recent unexplained weight loss, Clumsy or unsteadiness, Constant night pain, History of cancer, Tingling, Numbness, and Saddle anesthesia .  PATIENT GOALS: decrease pain    Pain  Current pain ratin  At best pain ratin  At worst pain ratin  Quality: sharp, radiating, discomfort, dull ache and tight  Relieving factors: relaxation, heat, rest and medications  Aggravating factors: standing, walking, sitting, stair climbing, running and lifting          Objective     Palpation     Additional Palpation Details  TTP: lumbar PSMs, QL, glute med, glute max, piriformis    Neurological Testing     Additional Neurological Details  Intermittent tingling into foot, no  other N/T or neurological changes to note    Active Range of Motion     Additional Active Range of Motion Details  LS AROM:   Flexion: 75%  Extension: 50%  SideBending right: 75%  SideBending left: 75%  Rotation right: 75%  Rotation left: 75%        Joint Play     Hypomobile: T8, T9, T10, T11, T12, L1, L2, L3, L4, L5 and S1     Pain: L1, L2, L3, L4, L5 and S1     Strength/Myotome Testing     Left Hip   Planes of Motion   Flexion: 3+  Extension: 3+  Abduction: 3+    Right Hip   Planes of Motion   Flexion: 3+  Extension: 3+  Abduction: 3+    Left Knee   Flexion: 4-  Extension: 4-    Right Knee   Flexion: 4-  Extension: 4-    Left Ankle/Foot   Dorsiflexion: 4    Right Ankle/Foot   Dorsiflexion: 4               POC Expires    Auth Status    Unit limit    Expiration date    PT/OT Limit        Diagnosis:     Precautions:     Comparable signs    Primary Impairments:    Patient Goals    Date 6/11       Used        Remaining        Manual Therapy           PA mobs           IASTM                                            Exercise Diary              Therapeutic Exercise             LTR           Open books                                                        Bike             Neuromuscular Re-education             PPU           Standing extension           SLR           Bridges           [facility]                                  Re-Eval             Therapeutic Activities             Education                           Leg press                                         Modalities

## 2025-06-11 NOTE — TELEPHONE ENCOUNTER
PA for hydrOXYzine HCl 25MG tabletsSUBMITTED to Medicare     via    [x]CMM-KEY: [id number]    [x]PA sent as URGENT    All office notes, labs and other pertaining documents and studies sent. Clinical questions answered. Awaiting determination from insurance company.     Turnaround time for your insurance to make a decision on your Prior Authorization can take 7-21 business days.

## 2025-06-16 ENCOUNTER — OFFICE VISIT (OUTPATIENT)
Dept: PHYSICAL THERAPY | Facility: CLINIC | Age: 80
End: 2025-06-16
Attending: FAMILY MEDICINE
Payer: MEDICARE

## 2025-06-16 DIAGNOSIS — M54.16 LUMBAR RADICULOPATHY: Primary | ICD-10-CM

## 2025-06-16 PROCEDURE — 97112 NEUROMUSCULAR REEDUCATION: CPT

## 2025-06-16 PROCEDURE — 97110 THERAPEUTIC EXERCISES: CPT

## 2025-06-16 PROCEDURE — 97140 MANUAL THERAPY 1/> REGIONS: CPT

## 2025-06-16 NOTE — PROGRESS NOTES
Daily Note     Today's date: 2025  Patient name: Chloé Anderson  : 1945  MRN: [id number]  Referring provider: Carline Howard DO  Dx:   Encounter Diagnosis     ICD-10-CM    1. Lumbar radiculopathy  M54.16                      Subjective: Pt reports that she is coming in with quite a bit of pain from over the weekend.       Objective: See treatment diary below      Assessment: Tolerated treatment well. Began today with warm up on bike prior to manual interventions.  Pt was able to lay prone with good tolerance to addition of PA mobs.  She was able to move into PPU with good tolerance.  She had a challenge in the past with pain into LTR so educated pt on importance of only stretching into tolerable amount of motion.  She was able to add nerve glide afterward on B LE.  She had little to no pain by end of treatment session today.   Patient demonstrated fatigue post treatment, exhibited good technique with therapeutic exercises, and would benefit from continued PT      Plan: Continue per plan of care.        POC Expires    Auth Status    Unit limit    Expiration date    PT/OT Limit        Diagnosis:     Precautions:     Comparable signs    Primary Impairments:    Patient Goals    Date       Used        Remaining        Manual Therapy           PA mobs  AK         IAS                                            Exercise Diary              Therapeutic Exercise             LTR  x20         Open books                                                        Bike    5'         Neuromuscular Re-education             PPU  x10         Standing extension           SLR           Bridges           Kenyatta Chen  X10 ea                     Re-Eval             Therapeutic Activities             Education                           Leg press                                         Modalities

## 2025-06-18 ENCOUNTER — OFFICE VISIT (OUTPATIENT)
Dept: PHYSICAL THERAPY | Facility: CLINIC | Age: 80
End: 2025-06-18
Attending: FAMILY MEDICINE
Payer: MEDICARE

## 2025-06-18 DIAGNOSIS — M54.16 LUMBAR RADICULOPATHY: Primary | ICD-10-CM

## 2025-06-18 PROCEDURE — 97140 MANUAL THERAPY 1/> REGIONS: CPT

## 2025-06-18 NOTE — TELEPHONE ENCOUNTER
PA for hydrOXYzine HCl 25MG tablets APPROVED     Date(s) approved 06/10/2025 to 12/31/2099     Case #[id number]    Patient advised by          []MyChart Message  [x]Phone call   []LMOM  []L/M to call office as no active Communication consent on file  []Unable to leave detailed message as VM not approved on Communication consent       Pharmacy advised by    [x]Fax  []Phone call  []Secure Chat    Specialty Pharmacy    []     Approval letter scanned into Media Yes

## 2025-06-18 NOTE — PROGRESS NOTES
Daily Note     Today's date: 2025  Patient name: Chloé Anderson  : 1945  MRN: [id number]  Referring provider: Carline Howard DO  Dx:   Encounter Diagnosis     ICD-10-CM    1. Lumbar radiculopathy  M54.16                      Subjective: Pt reports that she is sore coming into treatment.  She reports that she had tried working on her exercises but it was more irritating when she was doing it at home it was more irritating.       Objective: See treatment diary below      Assessment: Tolerated treatment well. Began today with warm up on bike with no irritation or symptoms noted.  She was able to get into prone for PA mobs.  She had some increased tightness within mid lumbar segments today with some more time.  Added some stretching as well, with emphasis on pt only moving through pain free motion, working into tolerable stretch.  She had decreased symptoms by end of treatment session today. Patient demonstrated fatigue post treatment, exhibited good technique with therapeutic exercises, and would benefit from continued PT      Plan: Continue per plan of care.        POC Expires    Auth Status    Unit limit    Expiration date    PT/OT Limit        Diagnosis:     Precautions:     Comparable signs    Primary Impairments:    Patient Goals    Date      Used        Remaining        Manual Therapy           PA mobs  AK  AK       IAS                                            Exercise Diary              Therapeutic Exercise             LTR  x20  x20       Open books    x10                                                    Bike    5'  5'       Neuromuscular Re-education             PPU  x10  x10       Standing extension           SLR           Bridges           Kenyatta           S. NAr Chen  X10 ea  x10 ea                   Re-Eval             Therapeutic Activities             Education                           Leg press                                         Modalities

## 2025-06-25 ENCOUNTER — OFFICE VISIT (OUTPATIENT)
Dept: PHYSICAL THERAPY | Facility: CLINIC | Age: 80
End: 2025-06-25
Attending: FAMILY MEDICINE
Payer: MEDICARE

## 2025-06-25 DIAGNOSIS — M54.16 LUMBAR RADICULOPATHY: Primary | ICD-10-CM

## 2025-06-25 PROCEDURE — 97110 THERAPEUTIC EXERCISES: CPT

## 2025-06-25 PROCEDURE — 97112 NEUROMUSCULAR REEDUCATION: CPT

## 2025-06-25 PROCEDURE — 97140 MANUAL THERAPY 1/> REGIONS: CPT

## 2025-06-25 NOTE — PROGRESS NOTES
"Daily Note     Today's date: 2025  Patient name: Chloé Anderson  : 1945  MRN: [id number]  Referring provider: Carline Howard DO  Dx:   Encounter Diagnosis     ICD-10-CM    1. Lumbar radiculopathy  M54.16             Start Time: 1530  Stop Time: 1615  Total time in clinic (min): 45 minutes    Subjective: Patient reports no adverse response or significant soreness after last session, noting compliance with HEP and improvements with activity.      Objective: See treatment diary below      Assessment: Tolerated treatment well. Followed warm up on bike with manual joint mobilizations for lumbar spine performed by primary PT, with continued relief and improved mobility noted post treatment. Increased reps during sciatic nerve glides without issue. Patient was having difficulties bringing L LE towards her chest due to mobility deficits, therefore provided patient with a towel to assist in ROM needed to properly perform exercise, which significantly benefited patient and improved overall form. Cont to progress as able. Patient demonstrated fatigue post treatment, exhibited good technique with therapeutic exercises, and would benefit from continued PT      Plan: Continue per plan of care.        POC Expires    Auth Status    Unit limit    Expiration date    PT/OT Limit        Diagnosis:     Precautions:     Comparable signs    Primary Impairments:    Patient Goals    Date     Used        Remaining        Manual Therapy           PA mobs  AK  AK  AK     IAS                                            Exercise Diary              Therapeutic Exercise             LTR  x20  x20  x20     Open books    x10  5\"x10                                                  Bike    5'  5'  5'     Neuromuscular Re-education             PPU  x10  x10  x10     Standing extension           SLR           Bridges           [place]           S. N. Glides  X10 ea  x10 ea  x15ea                 Re-Eval           "   Therapeutic Activities             Education                           Leg press                                         Modalities

## 2025-06-27 ENCOUNTER — OFFICE VISIT (OUTPATIENT)
Dept: PHYSICAL THERAPY | Facility: CLINIC | Age: 80
End: 2025-06-27
Attending: FAMILY MEDICINE
Payer: MEDICARE

## 2025-06-27 DIAGNOSIS — M54.16 LUMBAR RADICULOPATHY: Primary | ICD-10-CM

## 2025-06-27 PROCEDURE — 97112 NEUROMUSCULAR REEDUCATION: CPT | Performed by: PHYSICAL THERAPIST

## 2025-06-27 PROCEDURE — 97110 THERAPEUTIC EXERCISES: CPT | Performed by: PHYSICAL THERAPIST

## 2025-06-27 NOTE — PROGRESS NOTES
"Daily Note     Today's date: 2025  Patient name: Chloé Anderson  : 1945  MRN: [id number]  Referring provider: Carline Howard DO  Dx:   Encounter Diagnosis     ICD-10-CM    1. Lumbar radiculopathy  M54.16                      Subjective: Patient reports taht she had a break through with her symptoms yesterday. She realizes she should have come back and started sooner than she did. She notes that usually her best time of day is between 3-4 pm.       Objective: See treatment diary below      Assessment: Tolerated treatment well. Patient would benefit from continued PT. She was educated on correct form with sciatic nerve glides to avoid keeping the nerve in tension. She initially was challenged with clamshells to avoid trunk rotation compensation. She fatigued quickly with bridges this date so sets were limited. She was educated on DOMS. She has some discomfort with her hip flexor by end of session, but she responded well to standing hip sags at the edge of table.       Plan: Continue per plan of care.        POC Expires 25   Auth Status    Unit limit    Expiration date    PT/OT Limit        Diagnosis:     Precautions:     Comparable signs    Primary Impairments:    Patient Goals    Date    Used        Remaining        Manual Therapy           PA mobs  AK  AK  AK     IAS                                            Exercise Diary              Therapeutic Exercise             LTR  x20  x20  x20  20x   Open books    x10  5\"x10                                                  Bike    5'  5'  5'  5'    Neuromuscular Re-education             PPU  x10  x10  x10     Standing extension        10x   SLR           Bridges        10x   Clamshell        2x10   S. N. Glides  X10 ea  x10 ea  x15ea  10xea                Re-Eval             Therapeutic Activities             Education                           Leg press                                         Modalities                        "

## 2025-06-30 ENCOUNTER — OFFICE VISIT (OUTPATIENT)
Dept: PHYSICAL THERAPY | Facility: CLINIC | Age: 80
End: 2025-06-30
Attending: FAMILY MEDICINE
Payer: MEDICARE

## 2025-06-30 DIAGNOSIS — M54.16 LUMBAR RADICULOPATHY: Primary | ICD-10-CM

## 2025-06-30 PROCEDURE — 97110 THERAPEUTIC EXERCISES: CPT

## 2025-06-30 PROCEDURE — 97112 NEUROMUSCULAR REEDUCATION: CPT

## 2025-06-30 NOTE — PROGRESS NOTES
"Daily Note     Today's date: 2025  Patient name: Chloé Anderson  : 1945  MRN: [id number]  Referring provider: Carline Howard DO  Dx:   Encounter Diagnosis     ICD-10-CM    1. Lumbar radiculopathy  M54.16           Start Time: 1400  Stop Time: 1445  Total time in clinic (min): 45 minutes    Subjective: Patient reports first break through lasted about 10 minutes from Thursday's session with no pain. Notes her second break through was yesterday she was able to go 4 hours with no symptoms. Reports she had a set back today and has pain with walking forward and backwards is feeling symptoms all over her R hip; however overall she is progressing since starting therapy- no longer constant pain as now it comes and goes.        Objective: See treatment diary below      Assessment: Tolerated treatment well. Overall patient did well with program despite subjective above. Patient continued to perform mobility stretches and proximal hip strengthening in order to address impairments and improve function. Patient benefits from encouragement to stay on task during exercise.  And benefits form cues to breath on exertion with good carryover. Patient was challenged with SLS load transfer during hip extension on R LE and fatigues during bridges. Continues to respond well to standing sags at edge of table. Patient would benefit from ongoing PT.              Plan: Continue per plan of care.        POC Expires 25   Auth Status    Unit limit    Expiration date    PT/OT Limit        Diagnosis:      Precautions:      Comparable signs     Primary Impairments:     Patient Goals     Date    Used         Remaining         Manual Therapy            PA cherry  AK  AK  AK      IASFRANNIE                                                Exercise Diary               Therapeutic Exercise              LTR  x20  x20  x20  20x 20x   Open books    x10  5\"x10   5\" x10                                                    " Bike    5'  5'  5'  5'  5'    Neuromuscular Re-education              PPU  x10  x10  x10   X10    Standing extension        10x X10    SLR            Bridges        10x 10x   Clamshell        2x10 2x10   S. N. Glides  X10 ea  x10 ea  x15ea  10xea  10xea                Re-Eval              Therapeutic Activities              Education                             Leg press                                            Modalities

## 2025-07-02 ENCOUNTER — APPOINTMENT (OUTPATIENT)
Dept: PHYSICAL THERAPY | Facility: CLINIC | Age: 80
End: 2025-07-02
Attending: FAMILY MEDICINE
Payer: MEDICARE

## 2025-07-07 ENCOUNTER — OFFICE VISIT (OUTPATIENT)
Dept: PHYSICAL THERAPY | Facility: CLINIC | Age: 80
End: 2025-07-07
Attending: FAMILY MEDICINE
Payer: MEDICARE

## 2025-07-07 ENCOUNTER — RESULTS FOLLOW-UP (OUTPATIENT)
Dept: FAMILY MEDICINE CLINIC | Facility: CLINIC | Age: 80
End: 2025-07-07

## 2025-07-07 ENCOUNTER — APPOINTMENT (OUTPATIENT)
Dept: LAB | Facility: CLINIC | Age: 80
End: 2025-07-07
Payer: MEDICARE

## 2025-07-07 DIAGNOSIS — N39.0 RECURRENT UTI: ICD-10-CM

## 2025-07-07 DIAGNOSIS — M54.16 LUMBAR RADICULOPATHY: Primary | ICD-10-CM

## 2025-07-07 DIAGNOSIS — E83.42 HYPOMAGNESEMIA: Primary | ICD-10-CM

## 2025-07-07 DIAGNOSIS — E83.42 HYPOMAGNESEMIA: ICD-10-CM

## 2025-07-07 LAB
ALBUMIN SERPL BCG-MCNC: 4 G/DL (ref 3.5–5)
ALP SERPL-CCNC: 85 U/L (ref 34–104)
ALT SERPL W P-5'-P-CCNC: 13 U/L (ref 7–52)
AMORPH URATE CRY URNS QL MICRO: NORMAL
ANION GAP SERPL CALCULATED.3IONS-SCNC: 8 MMOL/L (ref 4–13)
AST SERPL W P-5'-P-CCNC: 20 U/L (ref 13–39)
BACTERIA UR QL AUTO: NORMAL /HPF
BILIRUB SERPL-MCNC: 0.79 MG/DL (ref 0.2–1)
BILIRUB UR QL STRIP: NEGATIVE
BUN SERPL-MCNC: 18 MG/DL (ref 5–25)
CALCIUM SERPL-MCNC: 9 MG/DL (ref 8.4–10.2)
CHLORIDE SERPL-SCNC: 98 MMOL/L (ref 96–108)
CLARITY UR: CLEAR
CO2 SERPL-SCNC: 29 MMOL/L (ref 21–32)
COLOR UR: NORMAL
CREAT SERPL-MCNC: 0.75 MG/DL (ref 0.6–1.3)
GFR SERPL CREATININE-BSD FRML MDRD: 74 ML/MIN/1.73SQ M
GLUCOSE P FAST SERPL-MCNC: 111 MG/DL (ref 65–99)
GLUCOSE UR STRIP-MCNC: NEGATIVE MG/DL
HGB UR QL STRIP.AUTO: NEGATIVE
KETONES UR STRIP-MCNC: NEGATIVE MG/DL
LEUKOCYTE ESTERASE UR QL STRIP: NEGATIVE
MAGNESIUM SERPL-MCNC: 1.8 MG/DL (ref 1.9–2.7)
NITRITE UR QL STRIP: NEGATIVE
NON-SQ EPI CELLS URNS QL MICRO: NORMAL /HPF
PH UR STRIP.AUTO: 7 [PH]
POTASSIUM SERPL-SCNC: 4 MMOL/L (ref 3.5–5.3)
PROT SERPL-MCNC: 6.8 G/DL (ref 6.4–8.4)
PROT UR STRIP-MCNC: NEGATIVE MG/DL
RBC #/AREA URNS AUTO: NORMAL /HPF
SODIUM SERPL-SCNC: 135 MMOL/L (ref 135–147)
SP GR UR STRIP.AUTO: 1.01 (ref 1–1.03)
UROBILINOGEN UR STRIP-ACNC: <2 MG/DL
WBC #/AREA URNS AUTO: NORMAL /HPF

## 2025-07-07 PROCEDURE — 36415 COLL VENOUS BLD VENIPUNCTURE: CPT

## 2025-07-07 PROCEDURE — 80053 COMPREHEN METABOLIC PANEL: CPT

## 2025-07-07 PROCEDURE — 83735 ASSAY OF MAGNESIUM: CPT

## 2025-07-07 PROCEDURE — 97140 MANUAL THERAPY 1/> REGIONS: CPT

## 2025-07-07 PROCEDURE — 97110 THERAPEUTIC EXERCISES: CPT

## 2025-07-07 RX ORDER — MAGNESIUM GLYCINATE 100 MG
144 TABLET ORAL DAILY
Status: SHIPPED
Start: 2025-07-07 | End: 2025-07-08

## 2025-07-07 NOTE — PROGRESS NOTES
"Daily Note     Today's date: 2025  Patient name: Chloé Anderson  : 1945  MRN: [id number]  Referring provider: Carline Howard DO  Dx:   Encounter Diagnosis     ICD-10-CM    1. Lumbar radiculopathy  M54.16                      Subjective: Patient reports that she has been feeling okay. She states that she has some break through's occasionally where she does not have pain. Patient reports increase in soreness after last session.         Objective: See treatment diary below      Assessment: Tolerated treatment well. Patient was able to abolish with time spent on stomach. She had internal rotation of femur with nerve glides noted. Patient session focused on mobility to this date due to increase in symptoms with decrease in symptoms post session. Patient would benefit from ongoing PT.              Plan: Continue per plan of care.        POC Expires 25   Auth Status    Unit limit    Expiration date    PT/OT Limit        Diagnosis:      Precautions:      Comparable signs     Primary Impairments:     Patient Goals     Date    Used         Remaining         Manual Therapy            MANDY carey SP AK  AK  AK      IAS                                                Exercise Diary               Therapeutic Exercise              LTR 20x x20  x20  x20  20x 20x   Open books    x10  5\"x10   5\" x10   SANJEEV  3'                                                Bike  5'  5'  5'  5'  5'  5'    Neuromuscular Re-education              PPU x20 x10  x10  x10   X10    Standing extension 2x10       10x X10    SLR            Bridges 10x       10x 10x   Clamshell        2x10 2x10   S. N. Glides 20x X10 ea  x10 ea  x15ea  10xea  10xea                Re-Eval              Therapeutic Activities              Education                             Leg press                                            Modalities                                         "

## 2025-07-08 LAB — BACTERIA UR CULT: NORMAL

## 2025-07-08 RX ORDER — MAGNESIUM GLYCINATE 100 MG
800 TABLET ORAL DAILY
Start: 2025-07-08

## 2025-07-08 NOTE — RESULT ENCOUNTER NOTE
Unstable labs - please call patient.    Elevated fasting glucose - Recommend lifestyle modifications - low sugar, low carb diet, and exercise.      Normal sodium.     Low Magnesium - Increase over the counter Magnesium glycinate 144mg (6 24mg) pills by mouth daily.  Check CMP and Magnesium in 1 week.      Message sent to patient via [employer] patient portal.    Nurse to also call patient.

## 2025-07-10 ENCOUNTER — EVALUATION (OUTPATIENT)
Dept: PHYSICAL THERAPY | Facility: CLINIC | Age: 80
End: 2025-07-10
Attending: FAMILY MEDICINE
Payer: MEDICARE

## 2025-07-10 DIAGNOSIS — M54.16 LUMBAR RADICULOPATHY: Primary | ICD-10-CM

## 2025-07-10 PROCEDURE — 97530 THERAPEUTIC ACTIVITIES: CPT

## 2025-07-10 PROCEDURE — 97110 THERAPEUTIC EXERCISES: CPT

## 2025-07-10 PROCEDURE — 97140 MANUAL THERAPY 1/> REGIONS: CPT

## 2025-07-10 NOTE — PROGRESS NOTES
PT Re-Evaluation     Today's date: 7/10/2025  Patient name: Chloé Anderson  : 1945  MRN: [id number]  Referring provider: Carline Howard DO  Dx:   Encounter Diagnosis     ICD-10-CM    1. Lumbar radiculopathy  M54.16                        Assessment  Impairments: abnormal muscle firing, abnormal or restricted ROM, activity intolerance, impaired physical strength, lacks appropriate home exercise program, pain with function, poor posture  and poor body mechanics    Assessment details: Chloé Anderson is a pleasant 79 y.o. female presents with signs and symptoms consistent with:   Lumbar radiculopathy  (primary encounter diagnosis)    Problem List:  1) hypomobility of lumbar spine  2) adverse neural tension     Comparable signs:  1) walking  2)     she has been making progress with PT and has been complaint with HEP to this date. She is progressing well with overall therapy with reduction of pain and symptoms. She still lacks overall strength and mobility to this date and will keep benefiting from continued skilled PT to address overall goals and maintain independence and function. This results in worry over not knowing what's wrong and fear of not being able to keep active. These impairments listed above are preventing the patient from participating in functional activity. No further referral appears necessary at this time based upon examination results, and is negative for any red flags. Prognosis is good given HEP compliance and attendance to physical therapy 2x a week.  Positive prognostic indicators include positive attitude toward recovery and good understanding of diagnosis and treatment plan options.  Negative prognostic indicators include chronicity of symptoms.  Patent will benefit from skilled physical therapy at this time to address deficits to improve overall function and return to PLOF. Patient verbalized understanding of POC, HEP, and return demonstrated HEP. All questions were answered to patients  satisfaction.     Please contact me if you have any questions or recommendations. Thank you for the referral and the opportunity to share in Chloé Anderson's care.            Understanding of Dx/Px/POC: good     Prognosis: good    Goals  Impairment Goals 4-6 weeks  In order to improve and maximize function patient will be able to...  - Decrease pain intensity to <2/10  - Improve lumbar AROM to >100% throughout  - Increase hip strength to 5/5 throughout  - Increase core strength in order to improve lumbar stabilization as demonstrated through progressive exercises.  - improve core, glute and multifidi stabilization as seen by decreased pelvic obliquity with exercises.  - Centralize symptoms and decrease numbness frequency/duration    Functional Goals 6-8 weeks  In order to improve and maximize function patient will be able to...  - Return to Prior Level of Function with no greater than 2/10 pain   - Increase Functional Status Measure (FOTO) to: > predicted outcomes  - Be independent and compliant with HEP  - Tolerate sitting and or standing without increased pain/compensation/difficulty for at least 30 minutes  - Perform sit to stand without increased pain/compensation/difficulty   - Demonstrate bend forward without increased pain/compensation/difficulty   - Ambulate with proper mechanics without compensations in pain.         Plan  Patient would benefit from: skilled physical therapy  Planned modality interventions: cryotherapy, thermotherapy: hydrocollator packs and TENS    Planned therapy interventions: home exercise program, graded exercise, functional ROM exercises, flexibility, body mechanics training, postural training, patient education, therapeutic activities, therapeutic exercise, manual therapy, joint mobilization, neuromuscular re-education, motor coordination training, graded activity, stretching and strengthening    Frequency: 2x week  Duration in weeks: 4  Treatment plan discussed with: patient and  PCP    Subjective Evaluation    History of Present Illness  Mechanism of injury: RE 7/10. Patient says she has been dilegnet with her exercises and she reports that this past week has been very beneficial for her and she has had a good week. She reports that she is 75% percent improved. She reports pain is better. She states she is able to go to the grocery store. She has been able to walk to her friends house for dinner and has better tolerance to her symptoms.     IE;WORK/SCHOOL: retired  HOME LIFE: independent,   HOBBIES/EXERCISE: cycling, rowing, treadmill at gym, gym class,   PLOF:  previous lumbar pain  HISTORY OF CURRENT INJURY:  Pain began after having some cardiac challenges.  It got to the point where she was unable to her regular activities.  She got to the point where was having her groceries delivered to her.  She hasn't been able to get as much of her activities done.    PAIN LOCATION/DESCRIPTORS: low back radiating down to R LE, upper hip, anterior quad, LE is heavy/dead weight,   AGGRAVATING FACTORS:  lifting, walking, cleaning, bending, in and out of car, waling to mailbox, bed mobility,   EASES: sitting, heat, shower,   DAY PATTERN: no particular pattern  IMAGING:    Chloé denies a new onset of Bladder incontinence, Bowel dysfunction, Dizziness, Dysphagia, Dysarthria, Drop attacks, Diplopia, Nausea, Ataxia, Recent unexplained weight loss, Clumsy or unsteadiness, Constant night pain, History of cancer, Tingling, Numbness, and Saddle anesthesia .  PATIENT GOALS: decrease pain    Pain  Current pain ratin  At best pain ratin  At worst pain ratin  Quality: sharp, radiating, discomfort, dull ache and tight  Relieving factors: relaxation, heat, rest and medications  Aggravating factors: standing, walking, sitting, stair climbing, running and lifting        Objective     Palpation     Additional Palpation Details  TTP: lumbar PSMs, QL, glute med, glute max, piriformis    Neurological Testing  "    Additional Neurological Details  Intermittent tingling into foot, no other N/T or neurological changes to note    Active Range of Motion     Additional Active Range of Motion Details  LS AROM:   Flexion: 75%  Extension: 75%  SideBending right: 100%  SideBending left: 100%  Rotation right: 100%  Rotation left: 75%        Joint Play     Hypomobile: T8, T9, T10, T11, T12, L1, L2, L3, L4, L5 and S1     Pain: L1, L2, L3, L4, L5 and S1     Strength/Myotome Testing     Left Hip   Planes of Motion   Flexion: 4-  Extension: 4-  Abduction: 4-  External rotation: 4-    Right Hip   Planes of Motion   Flexion: 4-  Extension: 4-  Abduction: 4-  External rotation: 4-    Left Knee   Flexion: 4  Extension: 4    Right Knee   Flexion: 4  Extension: 4    Left Ankle/Foot   Dorsiflexion: 4    Right Ankle/Foot   Dorsiflexion: 4    General Comments:      Lumbar Comments  TUG 15s without use of arms  5 STS: pushes through arms unable to perform without her arms. 24s                POC Expires 7/11/25   Auth Status    Unit limit    Expiration date    PT/OT Limit        Diagnosis:      Precautions:      Comparable signs     Primary Impairments:     Patient Goals     Date 7/7 7/10 6/18 6/25 6/27 6/30   Used         Remaining         Manual Therapy            PA mobs SP SP  AK  AK      IAS                                                Exercise Diary              Therapeutic Exercise             LTR 20x   x20  x20  20x 20x   Open books    x10  5\"x10   5\" x10   SANJEEV  3'                                                Bike  5' 5'   5'  5'  5'  5'    Neuromuscular Re-education             PPU x20   x10  x10   X10    Standing extension 2x10       10x X10    SLR            Bridges 10x       10x 10x   Clamshell        2x10 2x10   S. N. Glides 20x   x10 ea  x15ea  10xea  10xea                Re-Eval   SP           Therapeutic Activities             Education                             Leg press                                          "   Modalities

## 2025-07-11 ENCOUNTER — OFFICE VISIT (OUTPATIENT)
Age: 80
End: 2025-07-11
Payer: MEDICARE

## 2025-07-11 VITALS
OXYGEN SATURATION: 96 % | HEART RATE: 63 BPM | DIASTOLIC BLOOD PRESSURE: 80 MMHG | TEMPERATURE: 98.3 F | BODY MASS INDEX: 24.55 KG/M2 | WEIGHT: 130 LBS | SYSTOLIC BLOOD PRESSURE: 106 MMHG | HEIGHT: 61 IN

## 2025-07-11 DIAGNOSIS — I10 PRIMARY HYPERTENSION: ICD-10-CM

## 2025-07-11 DIAGNOSIS — N39.0 RECURRENT UTI: Primary | ICD-10-CM

## 2025-07-11 PROCEDURE — 99213 OFFICE O/P EST LOW 20 MIN: CPT | Performed by: PHYSICIAN ASSISTANT

## 2025-07-11 RX ORDER — LISINOPRIL 40 MG/1
40 TABLET ORAL DAILY
Qty: 90 TABLET | Refills: 1 | Status: SHIPPED | OUTPATIENT
Start: 2025-07-11

## 2025-07-11 RX ORDER — LISINOPRIL 40 MG/1
40 TABLET ORAL DAILY
Qty: 90 TABLET | Refills: 1 | OUTPATIENT
Start: 2025-07-11

## 2025-07-11 RX ORDER — CEPHALEXIN 500 MG/1
500 CAPSULE ORAL EVERY 6 HOURS SCHEDULED
Qty: 28 CAPSULE | Refills: 2 | Status: SHIPPED | OUTPATIENT
Start: 2025-07-11 | End: 2025-07-18

## 2025-07-11 NOTE — PROGRESS NOTES
"Name: Chloé Anderson      : 1945      MRN: [id number]  Encounter Provider: Josh Amor PA-C  Encounter Date: 2025   Encounter department: [facility]  :  Assessment & Plan  Recurrent UTI  Follow-up in 1 year for annual exam  Antibiotic sent to her pharmacy to have on hand  Orders:    Urine culture; Standing    cephalexin (KEFLEX) 500 mg capsule; Take 1 capsule (500 mg total) by mouth every 6 (six) hours for 7 days        History of Present Illness   Chloé Anderson is a 79 y.o. female who presents history of recurrent urinary tract infections since moving back from [place] where she lived for the past 30 years.  She has not been doing well on her current regime which includes d-mannose, vitamin C she uses Azo as needed and she has tested urine cultures twice that have been negative.    Review of Systems       Objective   /80 (BP Location: Left arm, Patient Position: Sitting, Cuff Size: Standard)   Pulse 63   Temp 98.3 °F (36.8 °C) (Tympanic)   Ht 5' 1\" (1.549 m)   Wt 59 kg (130 lb)   SpO2 96%   BMI 24.56 kg/m²     Physical Exam GEN: no acute distress    RESP: breathing comfortably with no accessory muscle use    ABD: soft, non-tender, non-distended   INCISION:    EXT: no significant peripheral edema       RADIOLOGY:   none        Results   No results found for: \"PSA\"  Lab Results   Component Value Date    CALCIUM 9.0 2025    K 4.0 2025    CO2 29 2025    CL 98 2025    BUN 18 2025    CREATININE 0.75 2025     Lab Results   Component Value Date    WBC 7.42 2025    HGB 13.4 2025    HCT 40.1 2025    MCV 94 2025     2025       Office Urine Dip  No results found for this or any previous visit (from the past hour).        "

## 2025-07-14 ENCOUNTER — APPOINTMENT (OUTPATIENT)
Dept: PHYSICAL THERAPY | Facility: CLINIC | Age: 80
End: 2025-07-14
Attending: FAMILY MEDICINE
Payer: MEDICARE

## 2025-07-16 ENCOUNTER — OFFICE VISIT (OUTPATIENT)
Dept: NEPHROLOGY | Facility: CLINIC | Age: 80
End: 2025-07-16
Attending: FAMILY MEDICINE
Payer: MEDICARE

## 2025-07-16 VITALS — WEIGHT: 132 LBS | HEIGHT: 61 IN | BODY MASS INDEX: 24.92 KG/M2

## 2025-07-16 DIAGNOSIS — I10 PRIMARY HYPERTENSION: ICD-10-CM

## 2025-07-16 DIAGNOSIS — E83.42 HYPOMAGNESEMIA: ICD-10-CM

## 2025-07-16 DIAGNOSIS — E87.1 HYPONATREMIA: ICD-10-CM

## 2025-07-16 DIAGNOSIS — R94.4 ABNORMAL RENAL FUNCTION TEST: ICD-10-CM

## 2025-07-16 DIAGNOSIS — I51.89 GRADE II DIASTOLIC DYSFUNCTION: Primary | ICD-10-CM

## 2025-07-16 LAB
SL AMB  POCT GLUCOSE, UA: NORMAL
SL AMB LEUKOCYTE ESTERASE,UA: NORMAL
SL AMB POCT BILIRUBIN,UA: NORMAL
SL AMB POCT BLOOD,UA: NORMAL
SL AMB POCT CLARITY,UA: CLEAR
SL AMB POCT COLOR,UA: YELLOW
SL AMB POCT KETONES,UA: NORMAL
SL AMB POCT NITRITE,UA: NORMAL
SL AMB POCT PH,UA: 7
SL AMB POCT SPECIFIC GRAVITY,UA: 1.01
SL AMB POCT URINE PROTEIN: NORMAL
SL AMB POCT UROBILINOGEN: 0.2

## 2025-07-16 PROCEDURE — 99204 OFFICE O/P NEW MOD 45 MIN: CPT | Performed by: INTERNAL MEDICINE

## 2025-07-16 PROCEDURE — 81002 URINALYSIS NONAUTO W/O SCOPE: CPT | Performed by: INTERNAL MEDICINE

## 2025-07-16 RX ORDER — SPIRONOLACTONE 25 MG/1
25 TABLET ORAL DAILY
Qty: 30 TABLET | Refills: 1 | Status: SHIPPED | OUTPATIENT
Start: 2025-07-16

## 2025-07-16 NOTE — ASSESSMENT & PLAN NOTE
-- Magnesium 1.8  --Check a repeat magnesium level if still low likely increase magnesium currently on magnesium supplementation    Orders:    spironolactone (ALDACTONE) 25 mg tablet; Take 1 tablet (25 mg total) by mouth daily    Basic metabolic panel; Future    Metanephrine, Fractionated Plasma Free; Future    Catecholamines, fractionated, plasma; Future    Aldosterone/Renin Ratio; Future    Magnesium; Future    POCT urine dip

## 2025-07-16 NOTE — ASSESSMENT & PLAN NOTE
-- Preserved ejection fraction of 60%  --Following with cardiology  --No known history of ischemic disease  --Currently has uncontrolled hypertension  --Continue lisinopril 40 mg daily  --Low 2 g sodium diet  --Gentle fluid restriction of 2 L/day    Orders:    spironolactone (ALDACTONE) 25 mg tablet; Take 1 tablet (25 mg total) by mouth daily    Basic metabolic panel; Future    Metanephrine, Fractionated Plasma Free; Future    Catecholamines, fractionated, plasma; Future    Aldosterone/Renin Ratio; Future    Magnesium; Future    POCT urine dip

## 2025-07-16 NOTE — PATIENT INSTRUCTIONS
1.)  Low 2 g sodium diet    2.)  Monitor weights at home    3.)  Avoid NSAIDs (ibuprofen, Motrin, Advil, Aleve, naproxen)    4.)  Monitor blood pressure at home, call if blood pressure greater than 150/90 persistently    5.) I will plan to discuss all results including blood work, and/or imaging at our next visit, unless there is an urgent indication, in which case I will call you earlier. If you have any questions or concerns about your results, please feel free to call our office.    6.) Start Spironolactone 25 mg daily. Lab work in 1-2 weeks

## 2025-07-16 NOTE — ASSESSMENT & PLAN NOTE
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

## 2025-07-16 NOTE — PROGRESS NOTES
Name: Chloé Anderson      : 1945      MRN: [id number]  Encounter Provider: Courtney Ortiz MD  Encounter Date: 2025   Encounter department: [facility]  :  Assessment & Plan  Grade II diastolic dysfunction  -- Preserved ejection fraction of 60%  --Following with cardiology  --No known history of ischemic disease  --Currently has uncontrolled hypertension  --Continue lisinopril 40 mg daily  --Low 2 g sodium diet  --Gentle fluid restriction of 2 L/day    Orders:    spironolactone (ALDACTONE) 25 mg tablet; Take 1 tablet (25 mg total) by mouth daily    Basic metabolic panel; Future    Metanephrine, Fractionated Plasma Free; Future    Catecholamines, fractionated, plasma; Future    Aldosterone/Renin Ratio; Future    Magnesium; Future    POCT urine dip    Hyponatremia  -- Resolved likely related to hydrochlorothiazide  --Avoid thiazide and thiazide like diuretic such as chlorthalidone  --Okay to utilize loop diuretics  --Will attempt spironolactone 25 mg daily BMP in 1 to 2 weeks  --Examines euvolemic    Orders:    Ambulatory Referral to Nephrology    spironolactone (ALDACTONE) 25 mg tablet; Take 1 tablet (25 mg total) by mouth daily    Basic metabolic panel; Future    Metanephrine, Fractionated Plasma Free; Future    Catecholamines, fractionated, plasma; Future    Aldosterone/Renin Ratio; Future    Magnesium; Future    POCT urine dip    Hypomagnesemia  -- Magnesium 1.8  --Check a repeat magnesium level if still low likely increase magnesium currently on magnesium supplementation    Orders:    spironolactone (ALDACTONE) 25 mg tablet; Take 1 tablet (25 mg total) by mouth daily    Basic metabolic panel; Future    Metanephrine, Fractionated Plasma Free; Future    Catecholamines, fractionated, plasma; Future    Aldosterone/Renin Ratio; Future    Magnesium; Future    POCT urine dip    Primary hypertension  -- Does not categorize as resistant hypertension  --Non-smoker  --Low 2 g sodium  diet  --Currently on Bystolic 30 mg daily along with lisinopril 40 mg daily  --Telmisartan and amlodipine --> resulted in shortness of breath  --Hydralazine --> causes hives  --Indapamide/hydrochlorothiazide --> results and electrolyte issues  --Losartan --> GI intolerance  --Start spironolactone 25 mg daily  --Target blood pressure less than 140/80 given her age  --Continue home blood pressure monitoring  --Will check metanephrines catecholamines, aldosterone/renin ratio though I will low suspicion for primary aldosteronism and the patient will be on spironolactone which could affect the results.  --If blood pressure still uncontrolled plan for renal artery Doppler    Orders:    Ambulatory Referral to Nephrology    spironolactone (ALDACTONE) 25 mg tablet; Take 1 tablet (25 mg total) by mouth daily    Basic metabolic panel; Future    Metanephrine, Fractionated Plasma Free; Future    Catecholamines, fractionated, plasma; Future    Aldosterone/Renin Ratio; Future    Magnesium; Future    POCT urine dip    Abnormal renal function test  -- Renal function quite stable with a baseline creatinine less than 1 mg potassium  --Suspect she has age-related nephron loss and arteriolosclerosis and hypertensive nephrosclerosis  --GFR greater than 60 mL/min  --Likely has underlying stage II chronic kidney disease    Orders:    spironolactone (ALDACTONE) 25 mg tablet; Take 1 tablet (25 mg total) by mouth daily    Basic metabolic panel; Future    Metanephrine, Fractionated Plasma Free; Future    Catecholamines, fractionated, plasma; Future    Aldosterone/Renin Ratio; Future    Magnesium; Future    POCT urine dip        Patient Instructions   1.)  Low 2 g sodium diet    2.)  Monitor weights at home    3.)  Avoid NSAIDs (ibuprofen, Motrin, Advil, Aleve, naproxen)    4.)  Monitor blood pressure at home, call if blood pressure greater than 150/90 persistently    5.) I will plan to discuss all results including blood work, and/or imaging  at our next visit, unless there is an urgent indication, in which case I will call you earlier. If you have any questions or concerns about your results, please feel free to call our office.    6.) Start Spironolactone 25 mg daily. Lab work in 1-2 weeks      It was a pleasure evaluating your patient in the office today. Thank you for allowing our team to participate in the care of  Chloé Anderson. Please do not hesitate to contact our team if further issues/questions shall arise in the interim.     History of Present Illness   HPI  Chloé Anderson is a 79 y.o. female who presents evaluation of hypertension, renal insufficiency and electrolyte issues.    She is a very pleasant 79-year-old female who will turn 80 years old on July 23 who is currently following with cardiology for her hypertension and grade 2 diastolic heart failure.    She has had longstanding hypertension more difficult to control over the last few years.    No potassium issues.  She recently had hyponatremia when on hydrochlorothiazide which did help her blood pressure significantly but was taken off the medication due to the hyponatremia.    Sodium has now normalized.    Renal function has been quite stable with GFR greater than 60 mL/min but less than 90 mL/min.    Non-smoker.    Family history positive for hypertension.  Her BMI is 24.9    She has social anxiety but is better about it.    No chest pain or shortness of breath.    Reviewed her blood work from July 7.  Magnesium 1.8 and improving.  CMP shows creatinine stable at 0.7 mg/dL GFR greater than 70 mL/min.  Sodium is normalized.  Potassium is normal.  Bicarbonate level 29    History obtained from: patient  Pertinent Medical History         Review of Systems   Constitutional:  Negative for activity change and fever.   Respiratory:  Negative for cough, chest tightness, shortness of breath and wheezing.    Cardiovascular:  Negative for chest pain and leg swelling.   Gastrointestinal:  Negative for  "abdominal pain, diarrhea, nausea and vomiting.   Endocrine: Negative for polyuria.   Genitourinary:  Negative for difficulty urinating, dysuria, flank pain, frequency and urgency.   Skin:  Negative for rash.   Neurological:  Negative for dizziness, syncope, light-headedness and headaches.     Medical History Reviewed by provider this encounter:  Allergies  Meds     .  Pertinent Medical History           Medical History Reviewed by provider this encounter:  Allergies  Meds     .  Past Medical History   Past Medical History[1]  Past Surgical History[2]  Family History[3]   reports that she has never smoked. She has never been exposed to tobacco smoke. She has never used smokeless tobacco. She reports that she does not currently use alcohol. She reports that she does not use drugs.  Current Outpatient Medications   Medication Instructions    atorvastatin (LIPITOR) 20 mg, Oral, Daily at bedtime    Calcium 500 mg, Daily    cephalexin (KEFLEX) 500 mg, Oral, Every 6 hours scheduled    Cholecalciferol (Vitamin D3) 50 MCG (2000 UT) TABS 1 tablet, Daily    famotidine (PEPCID) 20 mg tablet TAKE 1 TABLET BY MOUTH 2 TIMES A DAY AS NEEDED FOR HEARTBURN.    hydrOXYzine HCL (ATARAX) 12.5-25 mg, Oral, Every 6 hours PRN    lisinopril (ZESTRIL) 40 mg, Oral, Daily    Mag Glycinate 800 mg, Oral, Daily    mometasone (ELOCON) 0.1 % cream Topical, 2 times daily PRN    nebivolol (BYSTOLIC) 30 mg, Oral, Daily    NON FORMULARY 2,000 mg, Daily    Sodium Fluoride 5000 PPM 1.1 % GEL 1 Application, Daily at bedtime    spironolactone (ALDACTONE) 25 mg, Oral, Daily    triamcinolone (KENALOG) 0.1 % cream 1 Application, 2 times daily PRN   Allergies[4]   Medications Ordered Prior to Encounter[5]   Social History[6]     Medications Ordered Prior to Encounter[7]  Objective   Ht 5' 1\" (1.549 m)   Wt 59.9 kg (132 lb)   BMI 24.94 kg/m²      Physical Exam  Constitutional:       General: She is not in acute distress.     Appearance: She is " "well-developed. She is not diaphoretic.   HENT:      Head: Normocephalic and atraumatic.     Eyes:      General: No scleral icterus.     Pupils: Pupils are equal, round, and reactive to light.       Cardiovascular:      Rate and Rhythm: Normal rate and regular rhythm.      Heart sounds: Normal heart sounds. No murmur heard.     No friction rub. No gallop.   Pulmonary:      Effort: Pulmonary effort is normal. No respiratory distress.      Breath sounds: Normal breath sounds. No wheezing or rales.   Chest:      Chest wall: No tenderness.   Abdominal:      General: Bowel sounds are normal. There is no distension.      Palpations: Abdomen is soft.      Tenderness: There is no abdominal tenderness. There is no rebound.     Musculoskeletal:         General: Normal range of motion.      Cervical back: Normal range of motion and neck supple.     Skin:     Findings: No rash.     Neurological:      Mental Status: She is alert and oriented to person, place, and time.           Laboratory Results:        Invalid input(s): \"ALBUMIN\"    Results for orders placed or performed in visit on 07/07/25   Comprehensive metabolic panel   Result Value Ref Range    Sodium 135 135 - 147 mmol/L    Potassium 4.0 3.5 - 5.3 mmol/L    Chloride 98 96 - 108 mmol/L    CO2 29 21 - 32 mmol/L    ANION GAP 8 4 - 13 mmol/L    BUN 18 5 - 25 mg/dL    Creatinine 0.75 0.60 - 1.30 mg/dL    Glucose, Fasting 111 (H) 65 - 99 mg/dL    Calcium 9.0 8.4 - 10.2 mg/dL    AST 20 13 - 39 U/L    ALT 13 7 - 52 U/L    Alkaline Phosphatase 85 34 - 104 U/L    Total Protein 6.8 6.4 - 8.4 g/dL    Albumin 4.0 3.5 - 5.0 g/dL    Total Bilirubin 0.79 0.20 - 1.00 mg/dL    eGFR 74 ml/min/1.73sq m   Magnesium   Result Value Ref Range    Magnesium 1.8 (L) 1.9 - 2.7 mg/dL       Administrative Statements   I have spent a total time of 60 minutes in caring for this patient on the day of the visit/encounter including Risks and benefits of tx options, Instructions for management, Patient " and family education, Importance of tx compliance, Risk factor reductions, Impressions, Counseling / Coordination of care, Documenting in the medical record, Reviewing/placing orders in the medical record (including tests, medications, and/or procedures), and Obtaining or reviewing history  .       [1]   Past Medical History:  Diagnosis Date    Anxiety     Aortic regurgitation 2024    Baker's cyst     Eczema 2024    GERD (gastroesophageal reflux disease)     Grade II diastolic dysfunction 2024    History of basal cell carcinoma (BCC) of skin 06/10/2025    Hyperlipidemia     Hypertension     Hypertensive retinopathy of both eyes 10/02/2024    Hypomagnesemia 10/02/2024    Hyponatremia 10/02/2024    Interstitial cystitis     Kidney stone 1974    Left atrial enlargement     Mitral regurgitation 2024    Mitral valve sclerosis     Osteoarthritis     Osteoporosis     Overweight 2024    Posterior vitreous detachment of left eye 10/02/2024    Pulmonary hypertension (HCC)     Stable branch retinal vein occlusion of left eye 10/02/2024    Tricuspid regurgitation 2024    Urinary tract infection     Vitreous debris 10/02/2024    Vitreous syneresis of right eye 10/02/2024    White without pressure of peripheral retina of both eyes 10/02/2024   [2]   Past Surgical History:  Procedure Laterality Date    CATARACT EXTRACTION Bilateral      SECTION  1972    CYSTOSCOPY      EGD  2022    HYSTERECTOMY      LAPAROSCOPIC TOTAL HYSTERECTOMY  2016    Uterine Prolapse    OOPHORECTOMY      STRABISMUS SURGERY Bilateral 1955   [3]   Family History  Problem Relation Name Age of Onset    Heart failure Mother Sherine Wick         passed at 90    Cataracts Mother Sherine Delano     Anxiety disorder Mother Sherine Delano     Hypertension Mother Sherine Delano     Alcohol abuse Father Job     Stroke Father Job     Heart attack Father Job          passed at 69    Heart disease Father Job     Cancer Sister          passed at 78    Anxiety disorder Sister Mariza Whyte     Obesity Brother          passed at 69   [4]   Allergies  Allergen Reactions    Amlodipine Shortness Of Breath     Heart pressure    Telmisartan Shortness Of Breath     Heart pressure    Codeine Nausea Only and Vomiting    Bactrim [Sulfamethoxazole-Trimethoprim] GI Intolerance    Hydralazine Hives    Losartan GI Intolerance    Hydrocodone GI Intolerance    Indapamide Other (See Comments)     Pt stated that her electrolyte went down    Oxycodone GI Intolerance and Vomiting   [5]   Current Outpatient Medications on File Prior to Visit   Medication Sig Dispense Refill    atorvastatin (LIPITOR) 20 mg tablet TAKE 1 TABLET BY MOUTH DAILY AT BEDTIME 90 tablet 3    Calcium 500 MG tablet Take 500 mg by mouth in the morning      Cholecalciferol (Vitamin D3) 50 MCG (2000 UT) TABS Take 1 tablet by mouth in the morning.      famotidine (PEPCID) 20 mg tablet TAKE 1 TABLET BY MOUTH 2 TIMES A DAY AS NEEDED FOR HEARTBURN. 180 tablet 1    hydrOXYzine HCL (ATARAX) 25 mg tablet Take 0.5-1 tablets (12.5-25 mg total) by mouth every 6 (six) hours as needed for anxiety 30 tablet 0    lisinopril (ZESTRIL) 40 mg tablet TAKE 1 TABLET BY MOUTH EVERY DAY 90 tablet 1    Magnesium Bisglycinate (Mag Glycinate) 100 MG TABS Take 800 mg by mouth in the morning      mometasone (ELOCON) 0.1 % cream Apply topically 2 (two) times a day as needed (Ear Eczema) 15 g 0    nebivolol (BYSTOLIC) 20 MG tablet Take 1.5 tablets (30 mg total) by mouth daily      NON FORMULARY Take 2,000 mg by mouth in the morning D-Mannose      Sodium Fluoride 5000 PPM 1.1 % GEL Take 1 Application by mouth daily at bedtime      triamcinolone (KENALOG) 0.1 % cream Apply 1 Application topically as needed in the morning and 1 Application as needed in the evening for rash.      cephalexin (KEFLEX) 500 mg capsule Take 1 capsule (500 mg total) by mouth every 6  (six) hours for 7 days (Patient not taking: Reported on 7/16/2025) 28 capsule 2     No current facility-administered medications on file prior to visit.   [6]   Social History  Tobacco Use    Smoking status: Never     Passive exposure: Never    Smokeless tobacco: Never    Tobacco comments:     Never smoked   Vaping Use    Vaping status: Never Used   Substance and Sexual Activity    Alcohol use: Not Currently    Drug use: Never    Sexual activity: Not Currently   [7]   Current Outpatient Medications on File Prior to Visit   Medication Sig Dispense Refill    atorvastatin (LIPITOR) 20 mg tablet TAKE 1 TABLET BY MOUTH DAILY AT BEDTIME 90 tablet 3    Calcium 500 MG tablet Take 500 mg by mouth in the morning      Cholecalciferol (Vitamin D3) 50 MCG (2000 UT) TABS Take 1 tablet by mouth in the morning.      famotidine (PEPCID) 20 mg tablet TAKE 1 TABLET BY MOUTH 2 TIMES A DAY AS NEEDED FOR HEARTBURN. 180 tablet 1    hydrOXYzine HCL (ATARAX) 25 mg tablet Take 0.5-1 tablets (12.5-25 mg total) by mouth every 6 (six) hours as needed for anxiety 30 tablet 0    lisinopril (ZESTRIL) 40 mg tablet TAKE 1 TABLET BY MOUTH EVERY DAY 90 tablet 1    Magnesium Bisglycinate (Mag Glycinate) 100 MG TABS Take 800 mg by mouth in the morning      mometasone (ELOCON) 0.1 % cream Apply topically 2 (two) times a day as needed (Ear Eczema) 15 g 0    nebivolol (BYSTOLIC) 20 MG tablet Take 1.5 tablets (30 mg total) by mouth daily      NON FORMULARY Take 2,000 mg by mouth in the morning D-Mannose      Sodium Fluoride 5000 PPM 1.1 % GEL Take 1 Application by mouth daily at bedtime      triamcinolone (KENALOG) 0.1 % cream Apply 1 Application topically as needed in the morning and 1 Application as needed in the evening for rash.      cephalexin (KEFLEX) 500 mg capsule Take 1 capsule (500 mg total) by mouth every 6 (six) hours for 7 days (Patient not taking: Reported on 7/16/2025) 28 capsule 2     No current facility-administered medications on file  prior to visit.

## 2025-07-16 NOTE — ASSESSMENT & PLAN NOTE
-- Does not categorize as resistant hypertension  --Non-smoker  --Low 2 g sodium diet  --Currently on Bystolic 30 mg daily along with lisinopril 40 mg daily  --Telmisartan and amlodipine --> resulted in shortness of breath  --Hydralazine --> causes hives  --Indapamide/hydrochlorothiazide --> results and electrolyte issues  --Losartan --> GI intolerance  --Start spironolactone 25 mg daily  --Target blood pressure less than 140/80 given her age  --Continue home blood pressure monitoring  --Will check metanephrines catecholamines, aldosterone/renin ratio though I will low suspicion for primary aldosteronism and the patient will be on spironolactone which could affect the results.  --If blood pressure still uncontrolled plan for renal artery Doppler    Orders:    Ambulatory Referral to Nephrology    spironolactone (ALDACTONE) 25 mg tablet; Take 1 tablet (25 mg total) by mouth daily    Basic metabolic panel; Future    Metanephrine, Fractionated Plasma Free; Future    Catecholamines, fractionated, plasma; Future    Aldosterone/Renin Ratio; Future    Magnesium; Future    POCT urine dip

## 2025-07-16 NOTE — ASSESSMENT & PLAN NOTE
-- Resolved likely related to hydrochlorothiazide  --Avoid thiazide and thiazide like diuretic such as chlorthalidone  --Okay to utilize loop diuretics  --Will attempt spironolactone 25 mg daily BMP in 1 to 2 weeks  --Examines euvolemic    Orders:    Ambulatory Referral to Nephrology    spironolactone (ALDACTONE) 25 mg tablet; Take 1 tablet (25 mg total) by mouth daily    Basic metabolic panel; Future    Metanephrine, Fractionated Plasma Free; Future    Catecholamines, fractionated, plasma; Future    Aldosterone/Renin Ratio; Future    Magnesium; Future    POCT urine dip

## 2025-07-17 ENCOUNTER — OFFICE VISIT (OUTPATIENT)
Dept: PHYSICAL THERAPY | Facility: CLINIC | Age: 80
End: 2025-07-17
Attending: FAMILY MEDICINE
Payer: MEDICARE

## 2025-07-17 DIAGNOSIS — M54.16 LUMBAR RADICULOPATHY: Primary | ICD-10-CM

## 2025-07-17 PROCEDURE — 97110 THERAPEUTIC EXERCISES: CPT

## 2025-07-17 PROCEDURE — 97140 MANUAL THERAPY 1/> REGIONS: CPT

## 2025-07-17 PROCEDURE — 97112 NEUROMUSCULAR REEDUCATION: CPT

## 2025-07-17 NOTE — PROGRESS NOTES
"Daily Note     Today's date: 2025  Patient name: Chloé Anderson  : 1945  MRN: [id number]  Referring provider: Carline Howard DO  Dx:   Encounter Diagnosis     ICD-10-CM    1. Lumbar radiculopathy  M54.16                      Subjective: Patient reports that each day is getting better. She has bouts of time where she has no pain      Objective: See treatment diary below      Assessment: Tolerated treatment well. Patient demonstrated fatigue post treatment, exhibited good technique with therapeutic exercises, and would benefit from continued PT. She was challenged with addition of SLR to this date compared to the L side. She noted increase difficulty but was able to perform less reps. She was progressed with her bridge to improve endurance.       Plan: Continue per plan of care.  Progress treatment as tolerated.         POC Expires 25   Auth Status    Unit limit    Expiration date    PT/OT Limit        Diagnosis:      Precautions:      Comparable signs     Primary Impairments:     Patient Goals     Date 7/7 7/10 7/17 6/25 6/27 6/30   Used         Remaining         Manual Therapy           PA mobs SP SP SP  AK      IAS                                            Exercise Diary             Therapeutic Exercise            LTR 20x    x20  20x 20x   Open books     5\"x10   5\" x10   SANJEEV  3'  3'                                          Bike  5' 5'    5'  5'  5'    Neuromuscular Re-education            PPU x20    x10   X10    Standing extension 2x10  2x10    10x X10    SLR   3x5        Bridges 10x  3x10    10x 10x   Clamshell       2x10 2x10   S. N. Glides 20x  20x  x15ea  10xea  10xea               Re-Eval   SP          Therapeutic Activities            Education                           Leg press                                          Modalities                                         "

## 2025-07-18 ENCOUNTER — APPOINTMENT (OUTPATIENT)
Dept: PHYSICAL THERAPY | Facility: CLINIC | Age: 80
End: 2025-07-18
Attending: FAMILY MEDICINE
Payer: MEDICARE

## 2025-07-21 ENCOUNTER — OFFICE VISIT (OUTPATIENT)
Dept: PHYSICAL THERAPY | Facility: CLINIC | Age: 80
End: 2025-07-21
Attending: FAMILY MEDICINE
Payer: MEDICARE

## 2025-07-21 DIAGNOSIS — M54.16 LUMBAR RADICULOPATHY: Primary | ICD-10-CM

## 2025-07-21 PROCEDURE — 97112 NEUROMUSCULAR REEDUCATION: CPT

## 2025-07-21 PROCEDURE — 97110 THERAPEUTIC EXERCISES: CPT

## 2025-07-21 PROCEDURE — 97140 MANUAL THERAPY 1/> REGIONS: CPT

## 2025-07-21 NOTE — PROGRESS NOTES
"Daily Note     Today's date: 2025  Patient name: Chloé Anderson  : 1945  MRN: [id number]  Referring provider: Carline Howard DO  Dx:   Encounter Diagnosis     ICD-10-CM    1. Lumbar radiculopathy  M54.16                      Subjective: Patient reports that each day getting better, however       Objective: See treatment diary below      Assessment: Tolerated treatment well. Patient demonstrated fatigue post treatment, exhibited good technique with therapeutic exercises, and would benefit from continued PT. She was challenged with addition of SLR to this date compared to the L side but improved since last session. She was bale to demonstrate better tolerance to standing exercises and updated HEP for home with standing 3 way hip abd. She was able to demonstrated tying her shoes with no pain which she was not able to do 3 weeks ago.       Plan: Continue per plan of care.  Progress treatment as tolerated.         POC Expires 25   Auth Status    Unit limit    Expiration date    PT/OT Limit        Diagnosis:      Precautions:      Comparable signs     Primary Impairments:     Patient Goals     Date 7/7 7/10 7/17 7/21 6/27 6/30   Used         Remaining         Manual Therapy          PA mobs SP SP SP SP       IASTM                                        Exercise Diary            Therapeutic Exercise           LTR 20x   20x  20x 20x   Open books       5\" x10   SANJEEV  3'  3' 2x10      Standing hip 3 wya    2x10      Leg press     45# 3x10                 Bike  5' 5'     5'  5'    Neuromuscular Re-education           PPU x20      X10    Standing extension 2x10  2x10 2x10  10x X10    SLR   3x5 3x10      Bridges 10x  3x10 3x10  10x 10x   Clamshell      2x10 2x10   S. N. Glides 20x  20x   10xea  10xea              Re-Eval   SP         Therapeutic Activities           Education                         Leg press                                        Modalities                                         "

## 2025-07-25 ENCOUNTER — APPOINTMENT (OUTPATIENT)
Dept: PHYSICAL THERAPY | Facility: CLINIC | Age: 80
End: 2025-07-25
Attending: FAMILY MEDICINE
Payer: MEDICARE

## 2025-07-28 ENCOUNTER — OFFICE VISIT (OUTPATIENT)
Dept: PHYSICAL THERAPY | Facility: CLINIC | Age: 80
End: 2025-07-28
Attending: FAMILY MEDICINE
Payer: MEDICARE

## 2025-07-28 DIAGNOSIS — M54.16 LUMBAR RADICULOPATHY: Primary | ICD-10-CM

## 2025-07-28 PROCEDURE — 97110 THERAPEUTIC EXERCISES: CPT

## 2025-07-28 PROCEDURE — 97112 NEUROMUSCULAR REEDUCATION: CPT

## 2025-08-01 ENCOUNTER — OFFICE VISIT (OUTPATIENT)
Dept: PHYSICAL THERAPY | Facility: CLINIC | Age: 80
End: 2025-08-01
Attending: FAMILY MEDICINE
Payer: MEDICARE

## 2025-08-01 DIAGNOSIS — M54.16 LUMBAR RADICULOPATHY: Primary | ICD-10-CM

## 2025-08-01 PROCEDURE — 97112 NEUROMUSCULAR REEDUCATION: CPT

## 2025-08-04 ENCOUNTER — OFFICE VISIT (OUTPATIENT)
Dept: PHYSICAL THERAPY | Facility: CLINIC | Age: 80
End: 2025-08-04
Attending: FAMILY MEDICINE
Payer: MEDICARE

## 2025-08-04 DIAGNOSIS — M54.16 LUMBAR RADICULOPATHY: Primary | ICD-10-CM

## 2025-08-04 PROCEDURE — 97110 THERAPEUTIC EXERCISES: CPT | Performed by: PHYSICAL THERAPIST

## 2025-08-04 PROCEDURE — 97112 NEUROMUSCULAR REEDUCATION: CPT | Performed by: PHYSICAL THERAPIST

## 2025-08-04 PROCEDURE — 97140 MANUAL THERAPY 1/> REGIONS: CPT

## 2025-08-06 ENCOUNTER — APPOINTMENT (OUTPATIENT)
Dept: LAB | Facility: CLINIC | Age: 80
End: 2025-08-06
Payer: MEDICARE

## 2025-08-08 ENCOUNTER — OFFICE VISIT (OUTPATIENT)
Dept: PHYSICAL THERAPY | Facility: CLINIC | Age: 80
End: 2025-08-08
Attending: FAMILY MEDICINE
Payer: MEDICARE

## 2025-08-08 DIAGNOSIS — M54.16 LUMBAR RADICULOPATHY: Primary | ICD-10-CM

## 2025-08-08 PROCEDURE — 97112 NEUROMUSCULAR REEDUCATION: CPT

## 2025-08-08 PROCEDURE — 97110 THERAPEUTIC EXERCISES: CPT

## 2025-08-10 ENCOUNTER — RESULTS FOLLOW-UP (OUTPATIENT)
Dept: FAMILY MEDICINE CLINIC | Facility: CLINIC | Age: 80
End: 2025-08-10

## 2025-08-11 ENCOUNTER — EVALUATION (OUTPATIENT)
Dept: PHYSICAL THERAPY | Facility: CLINIC | Age: 80
End: 2025-08-11
Attending: FAMILY MEDICINE
Payer: MEDICARE

## 2025-08-12 ENCOUNTER — DOCUMENTATION (OUTPATIENT)
Dept: NEPHROLOGY | Facility: CLINIC | Age: 80
End: 2025-08-12

## 2025-08-13 ENCOUNTER — DOCUMENTATION (OUTPATIENT)
Dept: NEPHROLOGY | Facility: CLINIC | Age: 80
End: 2025-08-13

## 2025-08-14 ENCOUNTER — OFFICE VISIT (OUTPATIENT)
Dept: PHYSICAL THERAPY | Facility: CLINIC | Age: 80
End: 2025-08-14
Attending: FAMILY MEDICINE
Payer: MEDICARE

## 2025-08-18 ENCOUNTER — OFFICE VISIT (OUTPATIENT)
Dept: PHYSICAL THERAPY | Facility: CLINIC | Age: 80
End: 2025-08-18
Attending: FAMILY MEDICINE
Payer: MEDICARE

## 2025-08-18 DIAGNOSIS — M54.16 LUMBAR RADICULOPATHY: Primary | ICD-10-CM

## 2025-08-18 PROCEDURE — 97110 THERAPEUTIC EXERCISES: CPT

## 2025-08-18 PROCEDURE — 97530 THERAPEUTIC ACTIVITIES: CPT

## 2025-08-18 PROCEDURE — 97112 NEUROMUSCULAR REEDUCATION: CPT
